# Patient Record
Sex: FEMALE | Race: WHITE | NOT HISPANIC OR LATINO | Employment: OTHER | URBAN - NONMETROPOLITAN AREA
[De-identification: names, ages, dates, MRNs, and addresses within clinical notes are randomized per-mention and may not be internally consistent; named-entity substitution may affect disease eponyms.]

---

## 2017-03-21 ENCOUNTER — HOSPITAL ENCOUNTER (OUTPATIENT)
Facility: HOSPITAL | Age: 77
Setting detail: OBSERVATION
Discharge: HOME | End: 2017-03-23
Attending: EMERGENCY MEDICINE | Admitting: INTERNAL MEDICINE

## 2017-03-21 ENCOUNTER — APPOINTMENT (OUTPATIENT)
Dept: GENERAL RADIOLOGY | Facility: HOSPITAL | Age: 77
End: 2017-03-21

## 2017-03-21 DIAGNOSIS — I20.0 UNSTABLE ANGINA (HCC): ICD-10-CM

## 2017-03-21 DIAGNOSIS — R07.9 CHEST PAIN, UNSPECIFIED TYPE: Primary | ICD-10-CM

## 2017-03-21 LAB
ALBUMIN SERPL-MCNC: 4.1 G/DL (ref 3.4–4.8)
ALBUMIN/GLOB SERPL: 1.3 G/DL (ref 1.5–2.5)
ALP SERPL-CCNC: 75 U/L (ref 35–104)
ALT SERPL W P-5'-P-CCNC: 18 U/L (ref 10–36)
ANION GAP SERPL CALCULATED.3IONS-SCNC: 3.6 MMOL/L (ref 3.6–11.2)
APTT PPP: 28.3 SECONDS (ref 24.4–31)
AST SERPL-CCNC: 28 U/L (ref 10–30)
BASOPHILS # BLD AUTO: 0.01 10*3/MM3 (ref 0–0.3)
BASOPHILS # BLD AUTO: 0.01 10*3/MM3 (ref 0–0.3)
BASOPHILS NFR BLD AUTO: 0.2 % (ref 0–2)
BASOPHILS NFR BLD AUTO: 0.2 % (ref 0–2)
BILIRUB SERPL-MCNC: 0.5 MG/DL (ref 0.2–1.8)
BUN BLD-MCNC: 20 MG/DL (ref 7–21)
BUN/CREAT SERPL: 20.2 (ref 7–25)
CALCIUM SPEC-SCNC: 9.3 MG/DL (ref 7.7–10)
CHLORIDE SERPL-SCNC: 105 MMOL/L (ref 99–112)
CHOLEST SERPL-MCNC: 131 MG/DL (ref 0–200)
CK MB SERPL-CCNC: 0.51 NG/ML (ref 0–5)
CK MB SERPL-RTO: 0.8 % (ref 0–3)
CK SERPL-CCNC: 63 U/L (ref 24–173)
CO2 SERPL-SCNC: 29.4 MMOL/L (ref 24.3–31.9)
CREAT BLD-MCNC: 0.99 MG/DL (ref 0.43–1.29)
D DIMER PPP FEU-MCNC: 0.34 MG/L (FEU) (ref 0–0.5)
DEPRECATED RDW RBC AUTO: 42.5 FL (ref 37–54)
DEPRECATED RDW RBC AUTO: 43 FL (ref 37–54)
EOSINOPHIL # BLD AUTO: 0 10*3/MM3 (ref 0–0.7)
EOSINOPHIL # BLD AUTO: 0.01 10*3/MM3 (ref 0–0.7)
EOSINOPHIL NFR BLD AUTO: 0 % (ref 0–7)
EOSINOPHIL NFR BLD AUTO: 0.2 % (ref 0–7)
ERYTHROCYTE [DISTWIDTH] IN BLOOD BY AUTOMATED COUNT: 13 % (ref 11.5–14.5)
ERYTHROCYTE [DISTWIDTH] IN BLOOD BY AUTOMATED COUNT: 13 % (ref 11.5–14.5)
GFR SERPL CREATININE-BSD FRML MDRD: 55 ML/MIN/1.73
GLOBULIN UR ELPH-MCNC: 3.2 GM/DL
GLUCOSE BLD-MCNC: 106 MG/DL (ref 70–110)
HCT VFR BLD AUTO: 42.8 % (ref 37–47)
HCT VFR BLD AUTO: 43.3 % (ref 37–47)
HDLC SERPL-MCNC: 44 MG/DL (ref 60–100)
HGB BLD-MCNC: 14.6 G/DL (ref 12–16)
HGB BLD-MCNC: 14.8 G/DL (ref 12–16)
HOLD SPECIMEN: NORMAL
HOLD SPECIMEN: NORMAL
IMM GRANULOCYTES # BLD: 0 10*3/MM3 (ref 0–0.03)
IMM GRANULOCYTES # BLD: 0.01 10*3/MM3 (ref 0–0.03)
IMM GRANULOCYTES NFR BLD: 0 % (ref 0–0.5)
IMM GRANULOCYTES NFR BLD: 0.2 % (ref 0–0.5)
INR PPP: 0.93 (ref 0.8–1.1)
INR PPP: 0.97 (ref 0.8–1.1)
LDLC SERPL CALC-MCNC: 59 MG/DL (ref 0–100)
LDLC/HDLC SERPL: 1.34 {RATIO}
LYMPHOCYTES # BLD AUTO: 1.46 10*3/MM3 (ref 1–3)
LYMPHOCYTES # BLD AUTO: 2.02 10*3/MM3 (ref 1–3)
LYMPHOCYTES NFR BLD AUTO: 30.6 % (ref 16–46)
LYMPHOCYTES NFR BLD AUTO: 37.9 % (ref 16–46)
MCH RBC QN AUTO: 31 PG (ref 27–33)
MCH RBC QN AUTO: 31 PG (ref 27–33)
MCHC RBC AUTO-ENTMCNC: 34.1 G/DL (ref 33–37)
MCHC RBC AUTO-ENTMCNC: 34.2 G/DL (ref 33–37)
MCV RBC AUTO: 90.8 FL (ref 80–94)
MCV RBC AUTO: 90.9 FL (ref 80–94)
MONOCYTES # BLD AUTO: 0.49 10*3/MM3 (ref 0.1–0.9)
MONOCYTES # BLD AUTO: 0.55 10*3/MM3 (ref 0.1–0.9)
MONOCYTES NFR BLD AUTO: 11.5 % (ref 0–12)
MONOCYTES NFR BLD AUTO: 9.2 % (ref 0–12)
NEUTROPHILS # BLD AUTO: 2.73 10*3/MM3 (ref 1.4–6.5)
NEUTROPHILS # BLD AUTO: 2.81 10*3/MM3 (ref 1.4–6.5)
NEUTROPHILS NFR BLD AUTO: 52.7 % (ref 40–75)
NEUTROPHILS NFR BLD AUTO: 57.3 % (ref 40–75)
OSMOLALITY SERPL CALC.SUM OF ELEC: 278.7 MOSM/KG (ref 273–305)
PLATELET # BLD AUTO: 166 10*3/MM3 (ref 130–400)
PLATELET # BLD AUTO: 174 10*3/MM3 (ref 130–400)
PMV BLD AUTO: 9.7 FL (ref 6–10)
PMV BLD AUTO: 9.9 FL (ref 6–10)
POTASSIUM BLD-SCNC: 3.7 MMOL/L (ref 3.5–5.3)
PROT SERPL-MCNC: 7.3 G/DL (ref 6–8)
PROTHROMBIN TIME: 10.3 SECONDS (ref 9.8–11.9)
PROTHROMBIN TIME: 10.7 SECONDS (ref 9.8–11.9)
RBC # BLD AUTO: 4.71 10*6/MM3 (ref 4.2–5.4)
RBC # BLD AUTO: 4.77 10*6/MM3 (ref 4.2–5.4)
SODIUM BLD-SCNC: 138 MMOL/L (ref 135–153)
TRIGL SERPL-MCNC: 140 MG/DL (ref 0–150)
TROPONIN I SERPL-MCNC: <0.006 NG/ML
VLDLC SERPL-MCNC: 28 MG/DL
WBC NRBC COR # BLD: 4.77 10*3/MM3 (ref 4.5–12.5)
WBC NRBC COR # BLD: 5.33 10*3/MM3 (ref 4.5–12.5)
WHOLE BLOOD HOLD SPECIMEN: NORMAL
WHOLE BLOOD HOLD SPECIMEN: NORMAL

## 2017-03-21 PROCEDURE — 93005 ELECTROCARDIOGRAM TRACING: CPT | Performed by: INTERNAL MEDICINE

## 2017-03-21 PROCEDURE — 84484 ASSAY OF TROPONIN QUANT: CPT | Performed by: PHYSICIAN ASSISTANT

## 2017-03-21 PROCEDURE — 71010 XR CHEST 1 VW: CPT | Performed by: RADIOLOGY

## 2017-03-21 PROCEDURE — 25010000002 ENOXAPARIN PER 10 MG: Performed by: INTERNAL MEDICINE

## 2017-03-21 PROCEDURE — 80061 LIPID PANEL: CPT | Performed by: INTERNAL MEDICINE

## 2017-03-21 PROCEDURE — G0378 HOSPITAL OBSERVATION PER HR: HCPCS

## 2017-03-21 PROCEDURE — 96372 THER/PROPH/DIAG INJ SC/IM: CPT

## 2017-03-21 PROCEDURE — 85730 THROMBOPLASTIN TIME PARTIAL: CPT | Performed by: INTERNAL MEDICINE

## 2017-03-21 PROCEDURE — 85610 PROTHROMBIN TIME: CPT | Performed by: INTERNAL MEDICINE

## 2017-03-21 PROCEDURE — 93005 ELECTROCARDIOGRAM TRACING: CPT | Performed by: PHYSICIAN ASSISTANT

## 2017-03-21 PROCEDURE — 99204 OFFICE O/P NEW MOD 45 MIN: CPT | Performed by: INTERNAL MEDICINE

## 2017-03-21 PROCEDURE — 71010 HC CHEST PA OR AP: CPT

## 2017-03-21 PROCEDURE — 36415 COLL VENOUS BLD VENIPUNCTURE: CPT

## 2017-03-21 PROCEDURE — 85379 FIBRIN DEGRADATION QUANT: CPT | Performed by: INTERNAL MEDICINE

## 2017-03-21 PROCEDURE — 96365 THER/PROPH/DIAG IV INF INIT: CPT

## 2017-03-21 PROCEDURE — 96376 TX/PRO/DX INJ SAME DRUG ADON: CPT

## 2017-03-21 PROCEDURE — 82553 CREATINE MB FRACTION: CPT | Performed by: PHYSICIAN ASSISTANT

## 2017-03-21 PROCEDURE — 80053 COMPREHEN METABOLIC PANEL: CPT | Performed by: PHYSICIAN ASSISTANT

## 2017-03-21 PROCEDURE — 85025 COMPLETE CBC W/AUTO DIFF WBC: CPT | Performed by: INTERNAL MEDICINE

## 2017-03-21 PROCEDURE — 99284 EMERGENCY DEPT VISIT MOD MDM: CPT

## 2017-03-21 PROCEDURE — 93010 ELECTROCARDIOGRAM REPORT: CPT | Performed by: INTERNAL MEDICINE

## 2017-03-21 PROCEDURE — 25010000002 HEPARIN (PORCINE) PER 1000 UNITS: Performed by: INTERNAL MEDICINE

## 2017-03-21 PROCEDURE — 82550 ASSAY OF CK (CPK): CPT | Performed by: PHYSICIAN ASSISTANT

## 2017-03-21 PROCEDURE — 85610 PROTHROMBIN TIME: CPT | Performed by: PHYSICIAN ASSISTANT

## 2017-03-21 PROCEDURE — 85025 COMPLETE CBC W/AUTO DIFF WBC: CPT | Performed by: PHYSICIAN ASSISTANT

## 2017-03-21 RX ORDER — CLOPIDOGREL BISULFATE 75 MG/1
75 TABLET ORAL DAILY
Status: DISCONTINUED | OUTPATIENT
Start: 2017-03-22 | End: 2017-03-23 | Stop reason: HOSPADM

## 2017-03-21 RX ORDER — ASPIRIN 325 MG
325 TABLET ORAL DAILY
Status: DISCONTINUED | OUTPATIENT
Start: 2017-03-22 | End: 2017-03-23 | Stop reason: HOSPADM

## 2017-03-21 RX ORDER — VITAMIN E 268 MG
400 CAPSULE ORAL EVERY 12 HOURS SCHEDULED
Status: DISCONTINUED | OUTPATIENT
Start: 2017-03-21 | End: 2017-03-23 | Stop reason: HOSPADM

## 2017-03-21 RX ORDER — POLYETHYLENE GLYCOL 3350 17 G/17G
17 POWDER, FOR SOLUTION ORAL DAILY PRN
Status: DISCONTINUED | OUTPATIENT
Start: 2017-03-21 | End: 2017-03-23 | Stop reason: HOSPADM

## 2017-03-21 RX ORDER — CETIRIZINE HYDROCHLORIDE 10 MG/1
10 TABLET ORAL DAILY
COMMUNITY
End: 2022-01-01

## 2017-03-21 RX ORDER — CALCIUM CARBONATE 500(1250)
500 TABLET ORAL DAILY
Status: DISCONTINUED | OUTPATIENT
Start: 2017-03-21 | End: 2017-03-23 | Stop reason: HOSPADM

## 2017-03-21 RX ORDER — VALSARTAN 160 MG/1
160 TABLET ORAL DAILY
Status: DISCONTINUED | OUTPATIENT
Start: 2017-03-22 | End: 2017-03-23 | Stop reason: HOSPADM

## 2017-03-21 RX ORDER — POLYETHYLENE GLYCOL 3350 17 G/17G
17 POWDER, FOR SOLUTION ORAL DAILY PRN
Status: ON HOLD | COMMUNITY
End: 2022-01-01

## 2017-03-21 RX ORDER — I-VITE, TAB 1000-60-2MG (60/BT) 300MCG-200
1 TAB ORAL DAILY
Status: DISCONTINUED | OUTPATIENT
Start: 2017-03-21 | End: 2017-03-23 | Stop reason: HOSPADM

## 2017-03-21 RX ORDER — ATORVASTATIN CALCIUM 20 MG/1
20 TABLET, FILM COATED ORAL DAILY
Status: DISCONTINUED | OUTPATIENT
Start: 2017-03-21 | End: 2017-03-23

## 2017-03-21 RX ORDER — PHENOL 1.4 %
600 AEROSOL, SPRAY (ML) MUCOUS MEMBRANE DAILY
COMMUNITY
End: 2022-01-01

## 2017-03-21 RX ORDER — ROSUVASTATIN CALCIUM 10 MG/1
10 TABLET, COATED ORAL DAILY
COMMUNITY
End: 2017-03-23 | Stop reason: HOSPADM

## 2017-03-21 RX ORDER — DICYCLOMINE HCL 20 MG
20 TABLET ORAL 2 TIMES DAILY
COMMUNITY
End: 2022-01-01 | Stop reason: HOSPADM

## 2017-03-21 RX ORDER — AZITHROMYCIN 250 MG/1
250 TABLET, FILM COATED ORAL DAILY
COMMUNITY
End: 2017-03-23 | Stop reason: HOSPADM

## 2017-03-21 RX ORDER — RANOLAZINE 500 MG/1
500 TABLET, EXTENDED RELEASE ORAL EVERY 12 HOURS SCHEDULED
Status: DISCONTINUED | OUTPATIENT
Start: 2017-03-21 | End: 2017-03-23 | Stop reason: HOSPADM

## 2017-03-21 RX ORDER — I-VITE, TAB 1000-60-2MG (60/BT) 300MCG-200
1 TAB ORAL DAILY
Status: ON HOLD | COMMUNITY
End: 2022-01-01

## 2017-03-21 RX ORDER — HEPARIN SODIUM 5000 [USP'U]/ML
60 INJECTION, SOLUTION INTRAVENOUS; SUBCUTANEOUS ONCE
Status: COMPLETED | OUTPATIENT
Start: 2017-03-21 | End: 2017-03-21

## 2017-03-21 RX ORDER — ASPIRIN 325 MG
325 TABLET ORAL DAILY
COMMUNITY
End: 2017-09-18 | Stop reason: ALTCHOICE

## 2017-03-21 RX ORDER — HEPARIN SODIUM 5000 [USP'U]/ML
30 INJECTION, SOLUTION INTRAVENOUS; SUBCUTANEOUS AS NEEDED
Status: DISCONTINUED | OUTPATIENT
Start: 2017-03-21 | End: 2017-03-22

## 2017-03-21 RX ORDER — DICYCLOMINE HCL 20 MG
20 TABLET ORAL 2 TIMES DAILY
Status: DISCONTINUED | OUTPATIENT
Start: 2017-03-21 | End: 2017-03-23 | Stop reason: HOSPADM

## 2017-03-21 RX ORDER — TRAMADOL HYDROCHLORIDE 50 MG/1
50 TABLET ORAL EVERY 6 HOURS PRN
Status: DISCONTINUED | OUTPATIENT
Start: 2017-03-21 | End: 2017-03-23 | Stop reason: HOSPADM

## 2017-03-21 RX ORDER — AMLODIPINE BESYLATE 5 MG/1
5 TABLET ORAL
Status: DISCONTINUED | OUTPATIENT
Start: 2017-03-21 | End: 2017-03-23 | Stop reason: HOSPADM

## 2017-03-21 RX ORDER — CETIRIZINE HYDROCHLORIDE 10 MG/1
5 TABLET ORAL DAILY
Status: DISCONTINUED | OUTPATIENT
Start: 2017-03-22 | End: 2017-03-23 | Stop reason: HOSPADM

## 2017-03-21 RX ORDER — ISOSORBIDE MONONITRATE 60 MG/1
60 TABLET, EXTENDED RELEASE ORAL DAILY
Status: DISCONTINUED | OUTPATIENT
Start: 2017-03-22 | End: 2017-03-23 | Stop reason: HOSPADM

## 2017-03-21 RX ORDER — METOPROLOL SUCCINATE 50 MG/1
50 TABLET, EXTENDED RELEASE ORAL EVERY 12 HOURS SCHEDULED
Status: DISCONTINUED | OUTPATIENT
Start: 2017-03-21 | End: 2017-03-23 | Stop reason: HOSPADM

## 2017-03-21 RX ORDER — ASPIRIN 325 MG
325 TABLET ORAL ONCE
Status: COMPLETED | OUTPATIENT
Start: 2017-03-21 | End: 2017-03-21

## 2017-03-21 RX ORDER — OMEGA-3S/DHA/EPA/FISH OIL/D3 1150-1000
1 LIQUID (ML) ORAL DAILY
COMMUNITY
End: 2022-01-01 | Stop reason: HOSPADM

## 2017-03-21 RX ORDER — DIAZEPAM 5 MG/1
5 TABLET ORAL ONCE
Status: COMPLETED | OUTPATIENT
Start: 2017-03-22 | End: 2017-03-22

## 2017-03-21 RX ORDER — MINOCYCLINE HYDROCHLORIDE 50 MG/1
100 CAPSULE ORAL DAILY
Status: DISCONTINUED | OUTPATIENT
Start: 2017-03-21 | End: 2017-03-23 | Stop reason: HOSPADM

## 2017-03-21 RX ORDER — AMLODIPINE BESYLATE 5 MG/1
5 TABLET ORAL DAILY
Status: CANCELLED | OUTPATIENT
Start: 2017-03-21

## 2017-03-21 RX ORDER — AZITHROMYCIN 250 MG/1
250 TABLET, FILM COATED ORAL DAILY
Status: CANCELLED | OUTPATIENT
Start: 2017-03-21

## 2017-03-21 RX ORDER — NITROGLYCERIN 0.4 MG/1
TABLET SUBLINGUAL
Status: COMPLETED
Start: 2017-03-21 | End: 2017-03-21

## 2017-03-21 RX ORDER — ISOSORBIDE MONONITRATE 60 MG/1
60 TABLET, EXTENDED RELEASE ORAL DAILY
COMMUNITY
End: 2017-04-21

## 2017-03-21 RX ORDER — PANTOPRAZOLE SODIUM 40 MG/1
40 TABLET, DELAYED RELEASE ORAL DAILY
Status: DISCONTINUED | OUTPATIENT
Start: 2017-03-22 | End: 2017-03-23 | Stop reason: HOSPADM

## 2017-03-21 RX ORDER — MINOCYCLINE HYDROCHLORIDE 100 MG/1
100 CAPSULE ORAL DAILY
COMMUNITY
End: 2022-01-01 | Stop reason: HOSPADM

## 2017-03-21 RX ORDER — NITROGLYCERIN 0.4 MG/1
0.4 TABLET SUBLINGUAL
Status: DISCONTINUED | OUTPATIENT
Start: 2017-03-21 | End: 2017-03-23 | Stop reason: HOSPADM

## 2017-03-21 RX ORDER — DIPHENHYDRAMINE HCL 25 MG
25 CAPSULE ORAL ONCE
Status: COMPLETED | OUTPATIENT
Start: 2017-03-22 | End: 2017-03-22

## 2017-03-21 RX ORDER — SODIUM CHLORIDE 0.9 % (FLUSH) 0.9 %
10 SYRINGE (ML) INJECTION AS NEEDED
Status: DISCONTINUED | OUTPATIENT
Start: 2017-03-21 | End: 2017-03-23 | Stop reason: HOSPADM

## 2017-03-21 RX ORDER — HEPARIN SODIUM 5000 [USP'U]/ML
60 INJECTION, SOLUTION INTRAVENOUS; SUBCUTANEOUS AS NEEDED
Status: DISCONTINUED | OUTPATIENT
Start: 2017-03-21 | End: 2017-03-22

## 2017-03-21 RX ADMIN — HEPARIN SODIUM 12 UNITS/KG/HR: 10000 INJECTION, SOLUTION INTRAVENOUS at 22:52

## 2017-03-21 RX ADMIN — ATORVASTATIN CALCIUM 20 MG: 20 TABLET, FILM COATED ORAL at 19:00

## 2017-03-21 RX ADMIN — METOPROLOL SUCCINATE 50 MG: 50 TABLET, FILM COATED, EXTENDED RELEASE ORAL at 22:56

## 2017-03-21 RX ADMIN — DICYCLOMINE HYDROCHLORIDE 20 MG: 20 TABLET ORAL at 22:56

## 2017-03-21 RX ADMIN — AMLODIPINE BESYLATE 5 MG: 5 TABLET ORAL at 16:23

## 2017-03-21 RX ADMIN — ENOXAPARIN SODIUM 40 MG: 40 INJECTION SUBCUTANEOUS at 18:30

## 2017-03-21 RX ADMIN — Medication 10 ML: at 11:15

## 2017-03-21 RX ADMIN — NITROGLYCERIN 0.4 MG: 0.4 TABLET SUBLINGUAL at 19:14

## 2017-03-21 RX ADMIN — I-VITE, TAB 1000-60-2MG (60/BT) 1 TABLET: TAB at 19:00

## 2017-03-21 RX ADMIN — VITAMIN E CAP 400 UNIT 400 UNITS: 400 CAP at 22:55

## 2017-03-21 RX ADMIN — RANOLAZINE 500 MG: 500 TABLET, FILM COATED, EXTENDED RELEASE ORAL at 22:56

## 2017-03-21 RX ADMIN — ASPIRIN 325 MG: 325 TABLET ORAL at 10:36

## 2017-03-21 RX ADMIN — NITROGLYCERIN: 0.4 TABLET SUBLINGUAL at 19:01

## 2017-03-21 RX ADMIN — HEPARIN SODIUM 4650 UNITS: 5000 INJECTION, SOLUTION INTRAVENOUS; SUBCUTANEOUS at 22:49

## 2017-03-21 RX ADMIN — NITROGLYCERIN 1 INCH: 20 OINTMENT TOPICAL at 22:56

## 2017-03-21 RX ADMIN — Medication 500 MG: at 17:45

## 2017-03-21 RX ADMIN — MINOCYCLINE HYDROCHLORIDE 100 MG: 50 CAPSULE ORAL at 19:00

## 2017-03-21 NOTE — PLAN OF CARE
Problem: Patient Care Overview (Adult)  Goal: Plan of Care Review  Outcome: Ongoing (interventions implemented as appropriate)    Problem: Pain, Acute (Adult)  Goal: Identify Related Risk Factors and Signs and Symptoms  Outcome: Ongoing (interventions implemented as appropriate)

## 2017-03-21 NOTE — CONSULTS
Chief complaint  Chest pain    History of present illness:  Eloina is a very pleasant 77-year-old woman who presents with a history of known coronary artery disease status post bypass surgery and multiple stent implantations since that time.  Her last in implantation was in 2006.  She states that she has done relatively well until this morning when she began to have pressure clamping sensation across her back and into her chest and shoulders.  She notes that the symptoms are identical to her prior presentations with coronary artery disease.  She was hoping that the chest discomfort would resolve however continued and she eventually presented to the hospital as again, she identified the discomfort as being identical to her prior presentations.  She also notes that she's been more Short of breath over the past month.  She denies any orthopnea or edema.  She denies any palpitations, near syncope or syncopal symptoms.  She has otherwise been compliant with medical therapy.    Her 14 point review of systems is negative as was otherwise mentioned in the history of present illness.    Current medications:  Coronary artery disease as noted above  Status post cholecystectomy  Status post hysterectomy  Hyperlipidemia  Hypertension    Current medications:  Norvasc 5 mg daily  Aspirin 325 mg daily  Plavix 75 mg daily  Bentyl 20 mg daily  Premarin 0.3 mg  Imdur 60 mg daily  Metoprolol 50 mg daily  Minocycline  Protonix 40 mg daily  MiraLAX  Ranexa 500 mg twice a day    She has allergies to clindamycin penicillins and sulfa drugs    Her social history is notable for no significant tobacco alcohol or illicit drug use    Family history is noncontributory    Current physical examination:  Blood pressure is 160/80 with a heart rate of 60 respiratory rate is 18  In general she is a well-nourished well-developed female in no apparent distress, alert and oriented ×3  HEENT exam reveals her oral mucosa to be normal.  She has no  significant JVD or hepatojugular reflux.  She has no carotid bruits noted.  Chest is symmetric  Lungs are clear to auscultation without crackles or wheezes  Cardiac exam reveals an intact PMI with a regular rate and rhythm.  There is a normal S1 and S2.  There is no S3 or S4.  There are no murmurs rubs or bruits.  Abdominal exam reveals a soft belly which is nontender with normal bowel sounds and no hepatosplenomegaly.  Extremities reveal no clubbing cyanosis or edema  Peripheral pulses are intact  Musculoskeletal exam is normal  Neurologic exam is normal.    Current laboratory data:  CMP is normal  Initial troponin is less than 0.06 second troponin is less than 0.06 total CK is 63 CK-MB is 0.51  CBC is normal  Chest x-ray is normal  EKG reveals normal sinus rhythm with a right bundle branch block pattern  There are no acute or chronic ischemic changes noted.    Final impression and plan: Overall I am concerned that Eloina has had stuttering chest discomfort all day long which is identical to her prior presentations with coronary artery disease.  At this point I feel that the best course of action would be to proceed directly to cardiac catheterization.  I discussed the risks and benefits of doing so to include the risk of heart attack, stroke and death.  She understands these risks and agrees to proceed.  For now I will place her on a heparin drip in regard to the PCU.  If she continues to have chest discomfort we will consider emergent cardiac catheterization.

## 2017-03-21 NOTE — ED NOTES
EKG performed by tech and shown To Dr. Owen at 0943 on 03/21/2017     Kaitlin Mccall  03/21/17 0977

## 2017-03-21 NOTE — ED NOTES
Tried to call report to [facility]. No one answered. 2nd attempt.     Tona Ruggiero, RN  03/21/17 5841

## 2017-03-21 NOTE — H&P
Chief complaint  chest pain    Subjective     Patient is a 77 y.o. female followed in the office by Dr. Whitney Beal for multiple medical problems.  She presented to the emergency room today complaining of chest pain.  She notes it's a dull substernal pressure type sensation which radiated to her back and to her midsternal area and was associated with shortness of breath.  She notes is very similar to prior episodes of cardiac type chest pain.  She noted that the chest pain did wake her from rest early this morning.  After woke her from rest she had no relief with eating or drinking.  She sought attention in the emergency room.  She notes both the chest pain had resolved by the time she came to the emergency room and has had only intermittent twinges of chest pressure since.  She is currently resting comfortably and has no additional chest pain.    Mrs. Batista describes similar episodes of chest pain back in 2002 when she was complaining of chest pain and chest pressure.  She describes having 3 normal stress test before having a heart catheterization which found three-vessel disease.  She ultimately required coronary bypass grafting.  Following her bypass grafting she has required about 5 additional stents with last stent in 2006.  Her last heart catheter was in [facility] in [place] with Dr. Del Real and no significant obstructions were noted in 2009.  She has been doing relatively well with no chest pain for several months until this recurrence.  She describes the severity of this recent chest pain as an 8-9/10 at its peak.    Review of Systems   Review of Systems   Constitutional: Positive for fatigue. Negative for activity change, appetite change, chills, diaphoresis, fever and unexpected weight change.   HENT: Negative for congestion, drooling, ear pain, facial swelling, hearing loss, nosebleeds, rhinorrhea, sinus pressure, sneezing, sore throat, tinnitus, trouble swallowing and voice change.     Eyes: Negative for photophobia, pain, discharge, redness, itching and visual disturbance.   Respiratory: Positive for chest tightness and shortness of breath. Negative for apnea and choking.    Cardiovascular: Positive for chest pain. Negative for palpitations and leg swelling.   Gastrointestinal: Negative for abdominal distention, abdominal pain, blood in stool, constipation, nausea and vomiting.   Endocrine: Negative for cold intolerance, heat intolerance, polydipsia, polyphagia and polyuria.   Genitourinary: Negative for difficulty urinating, dysuria, enuresis, hematuria and pelvic pain.   Musculoskeletal: Negative for arthralgias, back pain, gait problem, joint swelling, myalgias and neck pain.   Skin: Negative for color change, pallor, rash and wound.   Allergic/Immunologic: Negative for food allergies and immunocompromised state.   Neurological: Negative for dizziness, tremors, weakness, light-headedness and headaches.   Hematological: Negative for adenopathy. Does not bruise/bleed easily.   Psychiatric/Behavioral: Negative for agitation, behavioral problems and suicidal ideas.        Past Medical History  History reviewed. No pertinent past medical history.  Surgical History  Past Surgical History   Procedure Laterality Date   • Appendectomy     • Hysterectomy     • Cholecystectomy     • Hernia repair     • Coronary artery bypass graft     • Coronary stent placement     • Cardiac catheterization     • Cardiac surgery       Family History  History reviewed. No pertinent family history.  Social History  Social History   Substance Use Topics   • Smoking status: Never Smoker   • Smokeless tobacco: None   • Alcohol use No     Home Medications  Prescriptions Prior to Admission   Medication Sig Dispense Refill Last Dose   • amLODIPine (NORVASC) 5 MG tablet Take 5 mg by mouth daily.   3/21/2017 at 0830   • aspirin 325 MG tablet Take 325 mg by mouth Daily.   3/20/2017 at 2200   • azithromycin (ZITHROMAX) 250 MG  tablet Take 250 mg by mouth Daily. Patient has 1 dose left   3/20/2017 at Unknown time   • calcium carbonate (OS-SAM) 600 MG tablet Take 600 mg by mouth Daily.   3/20/2017 at 1200   • cetirizine (zyrTEC) 10 MG tablet Take 10 mg by mouth Daily.   3/21/2017 at 0830   • clopidogrel (PLAVIX) 75 MG tablet Take 75 mg by mouth daily.   3/21/2017 at 0830   • dicyclomine (BENTYL) 20 MG tablet Take 20 mg by mouth 2 (Two) Times a Day. Noon and bedtime   3/20/2017 at 2230   • estrogens, conjugated, (PREMARIN) 0.3 MG tablet Take 0.3 mg by mouth Daily.   3/20/2017 at 1200   • isosorbide mononitrate (IMDUR) 60 MG 24 hr tablet Take 60 mg by mouth Daily.   3/21/2017 at 0830   • metoprolol succinate XL (TOPROL XL) 50 MG 24 hr tablet Take 50 mg by mouth 2 (Two) Times a Day.   3/21/2017 at 0830   • minocycline (MINOCIN,DYNACIN) 100 MG capsule Take 100 mg by mouth Daily.   3/20/2017 at 1200   • Multiple Vitamins-Minerals (I-MEME) tablet tablet Take 1 tablet by mouth Daily.   3/20/2017 at 1200   • Multiple Vitamins-Minerals (MACULAR VITAMIN BENEFIT) tablet Take 1 tablet by mouth Daily.   3/20/2017 at 1200   • pantoprazole (PROTONIX) 40 MG EC tablet Take 40 mg by mouth daily.   3/21/2017 at 0830   • polyethylene glycol (MIRALAX) packet Take 17 g by mouth Daily As Needed.   Past Week at Unknown time   • ranolazine (RANEXA) 500 MG 12 hr tablet Take 500 mg by mouth 2 (two) times a day.   3/21/2017 at 0830   • rosuvastatin (CRESTOR) 10 MG tablet Take 10 mg by mouth Daily.   3/20/2017 at 1200    • traMADol (ULTRAM) 50 MG tablet Take 50 mg by mouth every 6 (six) hours as needed for moderate pain (4-6).   3/21/2017 at 0830   • valsartan (DIOVAN) 160 MG tablet Take 160 mg by mouth Daily.   3/21/2017 at 0830   • vitamin E 400 UNIT capsule Take 400 Units by mouth 2 (Two) Times a Day.   3/21/2017 at 0830         Allergies:  Clindamycin/lincomycin; Penicillins; and Sulfa antibiotics    Objective     Vital Signs  Temp:  [98 °F (36.7 °C)-98.3 °F (36.8  °C)] 98 °F (36.7 °C)  Heart Rate:  [60-83] 60  Resp:  [18-20] 18  BP: (121-168)/() 156/96    Physical Exam:    Physical Exam   Constitutional: She is oriented to person, place, and time. She appears well-developed and well-nourished. No distress.   HENT:   Head: Normocephalic and atraumatic.   Right Ear: External ear normal.   Left Ear: External ear normal.   Nose: Nose normal.   Mouth/Throat: Oropharynx is clear and moist. No oropharyngeal exudate.   Eyes: Conjunctivae and EOM are normal. Pupils are equal, round, and reactive to light. Right eye exhibits no discharge. Left eye exhibits no discharge. No scleral icterus.   Neck: Normal range of motion. Neck supple. No JVD present. No tracheal deviation present. No thyromegaly present.   Cardiovascular: Normal rate, regular rhythm, normal heart sounds and intact distal pulses.  Exam reveals no gallop and no friction rub.    No murmur heard.  Pulmonary/Chest: Effort normal and breath sounds normal. No stridor. No respiratory distress. She has no wheezes. She has no rales. She exhibits no tenderness.   Abdominal: Soft. Bowel sounds are normal. She exhibits no distension and no mass. There is no rebound and no guarding. No hernia.   Genitourinary:   Genitourinary Comments: No Leon catheter   Musculoskeletal: She exhibits no edema, tenderness or deformity.   Lymphadenopathy:     She has no cervical adenopathy.   Neurological: She is alert and oriented to person, place, and time. She has normal reflexes. She displays normal reflexes. No cranial nerve deficit. She exhibits normal muscle tone. Coordination normal.   Skin: No rash noted. She is not diaphoretic. No erythema. No pallor.   Psychiatric: She has a normal mood and affect. Her behavior is normal. Judgment and thought content normal.   Nursing note and vitals reviewed.      Results Review:    I reviewed the patient's clinical results from admission:  Imaging Results (last 72 hours)     Procedure Component  Value Units Date/Time    XR Chest 1 View [[id number]] Collected:  03/21/17 1040     Updated:  03/21/17 1117    Narrative:       XR CHEST 1 VW-     CLINICAL INDICATION: Chest pain protocol          COMPARISON: 08/10/2016      TECHNIQUE: Single frontal view of the chest.     FINDINGS:     There is no focal alveolar infiltrate or effusion.  The cardiac silhouette is normal. The pulmonary vasculature is  unremarkable.  There is no evidence of an acute osseous abnormality.   There are no suspicious-appearing parenchymal soft tissue nodules.            Impression:       No evidence of active or acute cardiopulmonary disease on today's chest  radiograph.         This report was finalized on 3/21/2017 10:40 AM by Dr. Joshua Chaudhry MD.           Lab Results (last 72 hours)     Procedure Component Value Units Date/Time    CBC & Differential [[id number]] Collected:  03/21/17 1036    Specimen:  Blood Updated:  03/21/17 1053    Narrative:       The following orders were created for panel order CBC & Differential.  Procedure                               Abnormality         Status                     ---------                               -----------         ------                     CBC Auto Differential[[id number]]         Normal              Final result                 Please view results for these tests on the individual orders.    CBC Auto Differential [[id number]]  (Normal) Collected:  03/21/17 1036    Specimen:  Blood Updated:  03/21/17 1053     WBC 4.77 10*3/mm3      RBC 4.77 10*6/mm3      Hemoglobin 14.8 g/dL      Hematocrit 43.3 %      MCV 90.8 fL      MCH 31.0 pg      MCHC 34.2 g/dL      RDW 13.0 %      RDW-SD 43.0 fl      MPV 9.9 fL      Platelets 166 10*3/mm3      Neutrophil % 57.3 %      Lymphocyte % 30.6 %      Monocyte % 11.5 %      Eosinophil % 0.2 %      Basophil % 0.2 %      Immature Grans % 0.2 %      Neutrophils, Absolute 2.73 10*3/mm3      Lymphocytes, Absolute 1.46 10*3/mm3      Monocytes, Absolute 0.55 10*3/mm3       Eosinophils, Absolute 0.01 10*3/mm3      Basophils, Absolute 0.01 10*3/mm3      Immature Grans, Absolute 0.01 10*3/mm3     Protime-INR [[id number]]  (Normal) Collected:  03/21/17 1036    Specimen:  Blood Updated:  03/21/17 1102     Protime 10.3 Seconds      INR 0.93     Narrative:       Patients not on anticoagulant therapy:    INR 0.90-1.10     Suggested INR therapeutic range for stable oral anticoagulant therapy:             Routine therapy                      2.00-3.00           Recurrent MI                         2.50-3.50           Mechanical prosthetic valve          2.50-3.50    Comprehensive Metabolic Panel [[id number]]  (Abnormal) Collected:  03/21/17 1036    Specimen:  Blood Updated:  03/21/17 1106     Glucose 106 mg/dL      BUN 20 mg/dL      Creatinine 0.99 mg/dL      Sodium 138 mmol/L      Potassium 3.7 mmol/L      Chloride 105 mmol/L      CO2 29.4 mmol/L      Calcium 9.3 mg/dL      Total Protein 7.3 g/dL      Albumin 4.10 g/dL      ALT (SGPT) 18 U/L      AST (SGOT) 28 U/L      Alkaline Phosphatase 75 U/L       Note New Reference Ranges        Total Bilirubin 0.5 mg/dL      eGFR Non African Amer 55 (L) mL/min/1.73      Globulin 3.2 gm/dL      A/G Ratio 1.3 (L) g/dL      BUN/Creatinine Ratio 20.2      Anion Gap 3.6 mmol/L     Narrative:       The MDRD GFR formula is only valid for adults with stable renal function between ages 18 and 70.    Osmolality, Calculated [[id number]]  (Normal) Collected:  03/21/17 1036    Specimen:  Blood Updated:  03/21/17 1106     Osmolality Calc 278.7 mOsm/kg     Troponin [[id number]]  (Normal) Collected:  03/21/17 1036    Specimen:  Blood Updated:  03/21/17 1113     Troponin I <0.006 ng/mL     Narrative:       Ultra Troponin I Reference Range:         <=0.039 ng/mL: Negative    0.04-0.779 ng/mL: Indeterminate Range. Suspicious of MI.  Clinical correlation required.       >=0.78  ng/mL: Consistent with myocardial injury.  Clinical correlation required.    CK [[id number]]   (Normal) Collected:  03/21/17 1401    Specimen:  Blood from Arm, Right Updated:  03/21/17 1428     Creatine Kinase 63 U/L     Troponin [[id number]]  (Normal) Collected:  03/21/17 1401    Specimen:  Blood from Arm, Right Updated:  03/21/17 1439     Troponin I <0.006 ng/mL     Narrative:       Ultra Troponin I Reference Range:         <=0.039 ng/mL: Negative    0.04-0.779 ng/mL: Indeterminate Range. Suspicious of MI.  Clinical correlation required.       >=0.78  ng/mL: Consistent with myocardial injury.  Clinical correlation required.    CK-MB [[id number]]  (Normal) Collected:  03/21/17 1401    Specimen:  Blood from Arm, Right Updated:  03/21/17 1439     CKMB 0.51 ng/mL     CK-MB Index [[id number]]  (Normal) Collected:  03/21/17 1401    Specimen:  Blood from Arm, Right Updated:  03/21/17 1439     CK-MB Index 0.8 %     [place] Draw [[id number]] Collected:  03/21/17 1036    Specimen:  Blood Updated:  03/21/17 1501    Narrative:       The following orders were created for panel order [place] Draw.  Procedure                               Abnormality         Status                     ---------                               -----------         ------                     Light Blue Top[[id number]]                                    Final result               Green Top (Gel)[[id number]]                                   Final result               Lavender Top[[id number]]                                      Final result               Gold Top - SST[[id number]]                                    Final result                 Please view results for these tests on the individual orders.    Light Blue Top [[id number]] Collected:  03/21/17 1036    Specimen:  Blood Updated:  03/21/17 1501     Extra Tube hold for add-on       Auto resulted       Green Top (Gel) [[id number]] Collected:  03/21/17 1036    Specimen:  Blood Updated:  03/21/17 1501     Extra Tube Hold for add-ons.       Auto resulted.       Lavender Top [[id number]] Collected:  03/21/17 1036     Specimen:  Blood Updated:  03/21/17 1501     Extra Tube hold for add-on       Auto resulted       Gold Top - SST [[id number]] Collected:  03/21/17 1036    Specimen:  Blood Updated:  03/21/17 1501     Extra Tube Hold for add-ons.       Auto resulted.                 Assessment/Plan     Active Problems:   1) unstable angina   2) history of coronary bypass grafting in 2002 and 5 post bypass stents from [id number]  3) history of hiatal hernia status post 2 surgeries  4) hypertension  5) hyperlipidemia  6) right bundle-branch block-old from prior EKGs from 2015.          I discussed the patients findings and my recommendations with patient, family, nursing staff and consulting provider.     Alex Og MD  03/21/17  5:13 PM    Time: 33 minutes of time with history and physical and coordination of care

## 2017-03-21 NOTE — ED PROVIDER NOTES
Subjective   Patient is a 77 y.o. female presenting with chest pain.   Chest Pain   Pain location:  L chest  Pain quality: pressure    Pain radiates to:  Neck  Onset quality:  Sudden  Duration: Awoke at 7:30 with pain.  Timing:  Rare  Progression:  Resolved  Chronicity:  Recurrent  Context: not breathing and not movement    Context comment:  Patient reports that she had about a 30 minute episode of chest pressure with sob.  Relieved by:  Rest  Worsened by:  Nothing  Associated symptoms: no abdominal pain, no anxiety, no fever and no vomiting        Review of Systems   Constitutional: Negative.  Negative for fever.   HENT: Negative.    Respiratory: Negative.    Cardiovascular: Positive for chest pain.   Gastrointestinal: Negative.  Negative for abdominal pain and vomiting.   Endocrine: Negative.    Genitourinary: Negative.  Negative for dysuria.   Skin: Negative.    Neurological: Negative.    Psychiatric/Behavioral: Negative.    All other systems reviewed and are negative.      History reviewed. No pertinent past medical history.    Allergies   Allergen Reactions   • Clindamycin/Lincomycin    • Penicillins    • Sulfa Antibiotics        Past Surgical History   Procedure Laterality Date   • Appendectomy     • Hysterectomy     • Cholecystectomy     • Hernia repair     • Coronary artery bypass graft     • Coronary stent placement     • Cardiac catheterization     • Cardiac surgery         History reviewed. No pertinent family history.    Social History     Social History   • Marital status:      Spouse name: N/A   • Number of children: N/A   • Years of education: N/A     Social History Main Topics   • Smoking status: Never Smoker   • Smokeless tobacco: None   • Alcohol use No   • Drug use: No   • Sexual activity: Defer     Other Topics Concern   • None     Social History Narrative   • None           Objective   Physical Exam   Constitutional: She is oriented to person, place, and time. She appears well-developed  and well-nourished. No distress.   HENT:   Head: Normocephalic and atraumatic.   Right Ear: External ear normal.   Left Ear: External ear normal.   Nose: Nose normal.   Eyes: Conjunctivae and EOM are normal. Pupils are equal, round, and reactive to light.   Neck: Normal range of motion. Neck supple. No JVD present. No tracheal deviation present.   Cardiovascular: Normal rate, regular rhythm and normal heart sounds.    No murmur heard.  Pulmonary/Chest: Effort normal and breath sounds normal. No respiratory distress. She has no wheezes.   Abdominal: Soft. Bowel sounds are normal. There is no tenderness.   Musculoskeletal: Normal range of motion. She exhibits no edema or deformity.   Neurological: She is alert and oriented to person, place, and time. No cranial nerve deficit.   Skin: Skin is warm and dry. No rash noted. She is not diaphoretic. No erythema. No pallor.   Psychiatric: She has a normal mood and affect. Her behavior is normal. Thought content normal.   Nursing note and vitals reviewed.      Procedures         ED Course  ED Course   Value Comment By Time    Dr Og to call back JAIME Garcia 03/21 1119   ECG 12 Lead Normal sinus rhythm rate is 71 patient does have right bundle-branch block this is similar to her previous EKGs per JAIME Tejada 03/21 1120    Discussed with Dr. Og and Dr. Huertas. telemetry admit JAIME Garcia 03/21 1149                  MDM  Number of Diagnoses or Management Options  Chest pain, unspecified type: new and requires workup     Amount and/or Complexity of Data Reviewed  Clinical lab tests: ordered and reviewed  Tests in the radiology section of CPT®: ordered and reviewed  Tests in the medicine section of CPT®: ordered and reviewed  Discuss the patient with other providers: yes  Independent visualization of images, tracings, or specimens: yes    Risk of Complications, Morbidity, and/or Mortality  Presenting problems: moderate        Final diagnoses:    Chest pain, unspecified type            JAIME Garcia  03/21/17 1416

## 2017-03-22 LAB
ACT BLD: 157 SECONDS (ref 82–152)
ACT BLD: 157 SECONDS (ref 82–152)
ACT BLD: 178 SECONDS (ref 82–152)
ACT BLD: 229 SECONDS (ref 82–152)
APTT PPP: >100 SECONDS (ref 24.4–31)
HBA1C MFR BLD: 5.8 % (ref 4.5–5.7)
TROPONIN I SERPL-MCNC: <0.006 NG/ML

## 2017-03-22 PROCEDURE — C1769 GUIDE WIRE: HCPCS | Performed by: INTERNAL MEDICINE

## 2017-03-22 PROCEDURE — 63710000001 DIPHENHYDRAMINE PER 50 MG: Performed by: INTERNAL MEDICINE

## 2017-03-22 PROCEDURE — 25010000002 MORPHINE SULFATE (PF) 2 MG/ML SOLUTION

## 2017-03-22 PROCEDURE — 93455 CORONARY ART/GRFT ANGIO S&I: CPT | Performed by: INTERNAL MEDICINE

## 2017-03-22 PROCEDURE — 96366 THER/PROPH/DIAG IV INF ADDON: CPT

## 2017-03-22 PROCEDURE — 25010000002 HEPARIN (PORCINE) PER 1000 UNITS: Performed by: INTERNAL MEDICINE

## 2017-03-22 PROCEDURE — 25010000002 MIDAZOLAM PER 1 MG: Performed by: INTERNAL MEDICINE

## 2017-03-22 PROCEDURE — G0378 HOSPITAL OBSERVATION PER HR: HCPCS

## 2017-03-22 PROCEDURE — 85347 COAGULATION TIME ACTIVATED: CPT

## 2017-03-22 PROCEDURE — C1725 CATH, TRANSLUMIN NON-LASER: HCPCS | Performed by: INTERNAL MEDICINE

## 2017-03-22 PROCEDURE — 92937 PRQ TRLUML REVSC CAB GRF 1: CPT | Performed by: INTERNAL MEDICINE

## 2017-03-22 PROCEDURE — 93010 ELECTROCARDIOGRAM REPORT: CPT | Performed by: INTERNAL MEDICINE

## 2017-03-22 PROCEDURE — 96375 TX/PRO/DX INJ NEW DRUG ADDON: CPT

## 2017-03-22 PROCEDURE — 25010000002 HYDRALAZINE PER 20 MG: Performed by: INTERNAL MEDICINE

## 2017-03-22 PROCEDURE — 84484 ASSAY OF TROPONIN QUANT: CPT | Performed by: PHYSICIAN ASSISTANT

## 2017-03-22 PROCEDURE — C1894 INTRO/SHEATH, NON-LASER: HCPCS | Performed by: INTERNAL MEDICINE

## 2017-03-22 PROCEDURE — 25010000002 FENTANYL CITRATE (PF) 100 MCG/2ML SOLUTION: Performed by: INTERNAL MEDICINE

## 2017-03-22 PROCEDURE — 85730 THROMBOPLASTIN TIME PARTIAL: CPT | Performed by: INTERNAL MEDICINE

## 2017-03-22 PROCEDURE — 83036 HEMOGLOBIN GLYCOSYLATED A1C: CPT | Performed by: INTERNAL MEDICINE

## 2017-03-22 PROCEDURE — 93005 ELECTROCARDIOGRAM TRACING: CPT | Performed by: INTERNAL MEDICINE

## 2017-03-22 PROCEDURE — C1887 CATHETER, GUIDING: HCPCS | Performed by: INTERNAL MEDICINE

## 2017-03-22 PROCEDURE — 0 IOPAMIDOL PER 1 ML: Performed by: INTERNAL MEDICINE

## 2017-03-22 PROCEDURE — 99024 POSTOP FOLLOW-UP VISIT: CPT | Performed by: INTERNAL MEDICINE

## 2017-03-22 RX ORDER — NITROGLYCERIN 5 MG/ML
INJECTION, SOLUTION INTRAVENOUS AS NEEDED
Status: DISCONTINUED | OUTPATIENT
Start: 2017-03-22 | End: 2017-03-22 | Stop reason: HOSPADM

## 2017-03-22 RX ORDER — MIDAZOLAM HYDROCHLORIDE 1 MG/ML
INJECTION INTRAMUSCULAR; INTRAVENOUS AS NEEDED
Status: DISCONTINUED | OUTPATIENT
Start: 2017-03-22 | End: 2017-03-22 | Stop reason: HOSPADM

## 2017-03-22 RX ORDER — HEPARIN SODIUM 1000 [USP'U]/ML
INJECTION, SOLUTION INTRAVENOUS; SUBCUTANEOUS AS NEEDED
Status: DISCONTINUED | OUTPATIENT
Start: 2017-03-22 | End: 2017-03-22 | Stop reason: HOSPADM

## 2017-03-22 RX ORDER — SODIUM CHLORIDE 9 MG/ML
100 INJECTION, SOLUTION INTRAVENOUS CONTINUOUS
Status: DISCONTINUED | OUTPATIENT
Start: 2017-03-22 | End: 2017-03-23 | Stop reason: HOSPADM

## 2017-03-22 RX ORDER — LIDOCAINE HYDROCHLORIDE 20 MG/ML
INJECTION, SOLUTION INFILTRATION; PERINEURAL AS NEEDED
Status: DISCONTINUED | OUTPATIENT
Start: 2017-03-22 | End: 2017-03-22 | Stop reason: HOSPADM

## 2017-03-22 RX ORDER — MORPHINE SULFATE 2 MG/ML
INJECTION, SOLUTION INTRAMUSCULAR; INTRAVENOUS
Status: COMPLETED
Start: 2017-03-22 | End: 2017-03-22

## 2017-03-22 RX ORDER — SODIUM CHLORIDE 9 MG/ML
INJECTION, SOLUTION INTRAVENOUS
Status: COMPLETED
Start: 2017-03-22 | End: 2017-03-22

## 2017-03-22 RX ORDER — FENTANYL CITRATE 50 UG/ML
INJECTION, SOLUTION INTRAMUSCULAR; INTRAVENOUS AS NEEDED
Status: DISCONTINUED | OUTPATIENT
Start: 2017-03-22 | End: 2017-03-22 | Stop reason: HOSPADM

## 2017-03-22 RX ORDER — SODIUM CHLORIDE 9 MG/ML
INJECTION, SOLUTION INTRAVENOUS CONTINUOUS PRN
Status: DISCONTINUED | OUTPATIENT
Start: 2017-03-22 | End: 2017-03-22 | Stop reason: HOSPADM

## 2017-03-22 RX ORDER — MORPHINE SULFATE 2 MG/ML
2 INJECTION, SOLUTION INTRAMUSCULAR; INTRAVENOUS ONCE
Status: DISCONTINUED | OUTPATIENT
Start: 2017-03-22 | End: 2017-03-23 | Stop reason: HOSPADM

## 2017-03-22 RX ORDER — HYDRALAZINE HYDROCHLORIDE 20 MG/ML
INJECTION INTRAMUSCULAR; INTRAVENOUS AS NEEDED
Status: DISCONTINUED | OUTPATIENT
Start: 2017-03-22 | End: 2017-03-22 | Stop reason: HOSPADM

## 2017-03-22 RX ADMIN — RANOLAZINE 500 MG: 500 TABLET, FILM COATED, EXTENDED RELEASE ORAL at 21:04

## 2017-03-22 RX ADMIN — VITAMIN E CAP 400 UNIT 400 UNITS: 400 CAP at 15:21

## 2017-03-22 RX ADMIN — METOPROLOL SUCCINATE 50 MG: 50 TABLET, FILM COATED, EXTENDED RELEASE ORAL at 21:04

## 2017-03-22 RX ADMIN — AMLODIPINE BESYLATE 5 MG: 5 TABLET ORAL at 15:30

## 2017-03-22 RX ADMIN — CETIRIZINE HYDROCHLORIDE 5 MG: 10 TABLET ORAL at 15:24

## 2017-03-22 RX ADMIN — SODIUM CHLORIDE: 9 INJECTION, SOLUTION INTRAVENOUS at 06:58

## 2017-03-22 RX ADMIN — DICYCLOMINE HYDROCHLORIDE 20 MG: 20 TABLET ORAL at 21:04

## 2017-03-22 RX ADMIN — DIAZEPAM 5 MG: 5 TABLET ORAL at 06:53

## 2017-03-22 RX ADMIN — CLOPIDOGREL 75 MG: 75 TABLET, FILM COATED ORAL at 06:54

## 2017-03-22 RX ADMIN — VALSARTAN 160 MG: 160 TABLET, FILM COATED ORAL at 15:23

## 2017-03-22 RX ADMIN — Medication 500 MG: at 15:23

## 2017-03-22 RX ADMIN — TRAMADOL HYDROCHLORIDE 50 MG: 50 TABLET, FILM COATED ORAL at 21:05

## 2017-03-22 RX ADMIN — DICYCLOMINE HYDROCHLORIDE 20 MG: 20 TABLET ORAL at 15:21

## 2017-03-22 RX ADMIN — DIPHENHYDRAMINE HYDROCHLORIDE 25 MG: 25 CAPSULE ORAL at 06:54

## 2017-03-22 RX ADMIN — TRAMADOL HYDROCHLORIDE 50 MG: 50 TABLET, FILM COATED ORAL at 00:54

## 2017-03-22 RX ADMIN — VITAMIN E CAP 400 UNIT 400 UNITS: 400 CAP at 21:04

## 2017-03-22 RX ADMIN — ASPIRIN 325 MG: 325 TABLET ORAL at 06:54

## 2017-03-22 RX ADMIN — ISOSORBIDE MONONITRATE 60 MG: 60 TABLET, EXTENDED RELEASE ORAL at 15:24

## 2017-03-22 RX ADMIN — PANTOPRAZOLE SODIUM 40 MG: 40 TABLET, DELAYED RELEASE ORAL at 15:30

## 2017-03-22 RX ADMIN — ATORVASTATIN CALCIUM 20 MG: 20 TABLET, FILM COATED ORAL at 15:25

## 2017-03-22 RX ADMIN — I-VITE, TAB 1000-60-2MG (60/BT) 1 TABLET: TAB at 15:25

## 2017-03-22 RX ADMIN — MORPHINE SULFATE 2 MG: 2 INJECTION, SOLUTION INTRAMUSCULAR; INTRAVENOUS at 10:42

## 2017-03-22 RX ADMIN — MINOCYCLINE HYDROCHLORIDE 100 MG: 50 CAPSULE ORAL at 15:24

## 2017-03-22 RX ADMIN — TRAMADOL HYDROCHLORIDE 50 MG: 50 TABLET, FILM COATED ORAL at 13:20

## 2017-03-22 NOTE — PROGRESS NOTES
Discharge Planning Assessment  [facility]     Patient Name: Eloina Batista  MRN: [id number]  Today's Date: 3/22/2017    Admit Date: 3/21/2017          Discharge Needs Assessment       03/22/17 1412    Living Environment    Lives With spouse   Roberto Batista    Living Arrangements house    Quality Of Family Relationships supportive;helpful;involved    Able to Return to Prior Living Arrangements yes    Discharge Needs Assessment    Concerns To Be Addressed no discharge needs identified    Readmission Within The Last 30 Days no previous admission in last 30 days    Equipment Currently Used at Home none    Equipment Needed After Discharge none    Transportation Available family or friend will provide;car    Roberto will provide transportation at discharge.    Discharge Disposition still a patient            Discharge Plan       03/22/17 1414    Case Management/Social Work Plan    Plan Patient is from home where she lives with her  Roberto. Pt plans to return there at discharge. Pt utilizes no HH or DME. No CM needs identified. CM will continue to monitor.    Patient/Family In Agreement With Plan yes    Additional Comments Pt was admitted to [facility] with c/o CP and SOA. Plans to monitor patient. Consult cardiology. Cardiology started Hep gtt and moved pt to PCU with plans for a heart cath.         Discharge Placement     No information found                Demographic Summary       03/22/17 1406    Referral Information    Admission Type observation    Arrived From home or self-care    Referral Source admission list    Reason For Consult discharge planning    Contact Information    Permission Granted to Share Information With             Functional Status       03/22/17 0190    Functional Status Current    Current Functional Level Comment Patient is currently on bedrest lying flat due to sheath being pulled.    Functional Status Prior    Prior Functional Level Comment Patient is independent with ADL's at home.     Activity Tolerance    Current Activity Limitations bed rest   Pt lying flat at this time.    Usual Activity Tolerance good    Current Activity Tolerance other (see comments)   Pt lying flat at this time.    Cognitive/Perceptual/Developmental    Current Mental Status/Cognitive Functioning no deficits noted    Recent Changes in Mental Status/Cognitive Functioning no changes    Employment/Financial    Financial Concerns none    Employment/Finance Comments Patient has Medicare and [employer]. Patient utilizes [facility]. Pt states that there are no issues with obtaining prescriptions.         Legal       03/22/17 3092    Legal    Legal Comments Patient does not have POA/AD and does not wish to receive information at this time.             Kat Smith, ARISTEO

## 2017-03-22 NOTE — CONSULTS
Time In 1030 Time Out 1850      Order received for Cardiac Rehab Consultation.     Patient will be scheduled at [facility] Cardiac [facility]      Information discussed with: Patient/Family        Educated on: Benefits of Exercise,  Educated on Cardiac Rehab and Program Protocol, Brochure and/or educational material provided, Contact information given and Teach Back Verified      Comments: Pt has attended cardiac rehab in the past.  Very interested in the program.  Will f/u with pt in am and schedule for initial visit.       Thank you for the referral. Please contact the Cardiac Rehab Dept. (ext. [phone]) with any further questions or concerns.

## 2017-03-22 NOTE — PROGRESS NOTES
LOS: 0 days   Patient Care Team:  Flaco Beal MD as PCP - General (Internal Medicine)    Chief Complaint:Eloina Batista a 77 y.o. female presents with a history of known coronary artery disease and recurrent chest discomfort with dynamic ST changes.  She underwent cardiac catheterization today which demonstrated a right dominant coronary artery system with patent stents in the circumflex and right coronary arteries.  The internal mammary artery graft to the LAD was patent.  The vein graft to the diagonal branch had in-stent restenosis and underwent cutting balloon angioplasty       Interval History: She has had no further chest pain      Objective   Vital Signs  Temp:  [98.2 °F (36.8 °C)-98.3 °F (36.8 °C)] 98.2 °F (36.8 °C)  Heart Rate:  [55-81] 78  Resp:  [12-18] 12  BP: (118-185)/() 119/78  Arterial Line BP: (144-181)/(70-83) 181/83    Intake/Output Summary (Last 24 hours) at 03/22/17 1918  Last data filed at 03/22/17 1700   Gross per 24 hour   Intake           611.67 ml   Output              900 ml   Net          -288.33 ml       Comfortable NAD  PERRL, conjunctiva clear  Neck supple, no JVD or thyromegaly appreciated  S1/S2 RRR, no m/r/g  Lungs CTA B, normal effort  Abdomen S/NT/ND (+) BS, no HSM appreciated  Extremities warm, no clubbing, cyanosis, or edema  Normal gait  No visible or palpable skin lesions  A/Ox4, mood and affect appropriate  Site of cardiac catheterization is well-healed    Results Review:        Results from last 7 days  Lab Units 03/21/17  1036   SODIUM mmol/L 138   POTASSIUM mmol/L 3.7   CHLORIDE mmol/L 105   TOTAL CO2 mmol/L 29.4   BUN mg/dL 20   CREATININE mg/dL 0.99   GLUCOSE mg/dL 106   CALCIUM mg/dL 9.3       Results from last 7 days  Lab Units 03/22/17  0051 03/21/17 2010 03/21/17  1401   CK TOTAL U/L  --   --  63   TROPONIN I ng/mL <0.006 <0.006 <0.006   CK MB INDEX %  --   --  0.8       Results from last 7 days  Lab Units 03/21/17 2010 03/21/17  1036   WBC  10*3/mm3 5.33 4.77   HEMOGLOBIN g/dL 14.6 14.8   HEMATOCRIT % 42.8 43.3   PLATELETS 10*3/mm3 174 166       Results from last 7 days  Lab Units 03/22/17  0510 03/21/17 2010 03/21/17  1036   INR   --  0.97 0.93   APTT seconds >100.0* 28.3  --        Results from last 7 days  Lab Units 03/21/17 2010   CHOLESTEROL mg/dL 131           Results from last 7 days  Lab Units 03/21/17 2010   CHOLESTEROL mg/dL 131   TRIGLYCERIDES mg/dL 140   HDL CHOL mg/dL 44*       I reviewed the patient's new clinical results.  I personally viewed and interpreted the patient's EKG/Telemetry data        Medication Review:     amLODIPine 5 mg Oral Q24H   aspirin 325 mg Oral Daily   atorvastatin 20 mg Oral Daily   cetirizine 5 mg Oral Daily   clopidogrel 75 mg Oral Daily   dicyclomine 20 mg Oral BID   I-nancy 1 tablet Oral Daily   isosorbide mononitrate 60 mg Oral Daily   metoprolol succinate XL 50 mg Oral Q12H   minocycline 100 mg Oral Daily   Morphine 2 mg Intravenous Once   nitroglycerin 1 inch Topical Once   oyster shell calcium 500 mg Oral Daily   pantoprazole 40 mg Oral Daily   ranolazine 500 mg Oral Q12H   valsartan 160 mg Oral Daily   vitamin E 400 Units Oral Q12H         sodium chloride 100 mL/hr Last Rate: 100 mL/hr (03/22/17 0854)       Active Problems:    Chest pain        Assessment/Plan   Coronary artery disease  The patient is status post percutaneous revascularization of the vein graft to the diagonal branch.  She should be ready for discharge in the morning.      Alex Blanco MD  03/22/17  7:18 PM

## 2017-03-22 NOTE — PROGRESS NOTES
LOS: 0 days   Interval History: Awake and responsive after cath this AM. She had intervention performed. She has no chest pain, leg pain or focal neuro deficits. No other changes at present . Continue to monitor post-procedure.      History taken from: patient chart    Review of Systems:     Review of Systems - Negative except present illness    Objective     Vital Signs  Temp:  [97.6 °F (36.4 °C)-98.3 °F (36.8 °C)] 98.2 °F (36.8 °C)  Heart Rate:  [55-73] 73  Resp:  [12-18] 12  BP: (121-185)/() 137/88  Arterial Line BP: (164-168)/(73-78) 164/78    Physical Exam:  HEENT; Neg  Resp: Clear  HEART: RRR  Abd: negd   Neur: Normal,nonfocal.            Results Review:     I reviewed the patient's new clinical results  : See Below    Medication Review:     Current Facility-Administered Medications:   •  amLODIPine (NORVASC) tablet 5 mg, 5 mg, Oral, Q24H, Alex gO MD, 5 mg at 03/21/17 1623  •  aspirin tablet 325 mg, 325 mg, Oral, Daily, Alex Og MD, 325 mg at 03/22/17 0654  •  atorvastatin (LIPITOR) tablet 20 mg, 20 mg, Oral, Daily, Alex Og MD, 20 mg at 03/21/17 1900  •  cetirizine (zyrTEC) tablet 5 mg, 5 mg, Oral, Daily, Alex Og MD  •  clopidogrel (PLAVIX) tablet 75 mg, 75 mg, Oral, Daily, Alex Og MD, 75 mg at 03/22/17 0654  •  dicyclomine (BENTYL) tablet 20 mg, 20 mg, Oral, BID, Alex Og MD, 20 mg at 03/21/17 2256  •  I-nancy tablet 1 tablet, 1 tablet, Oral, Daily, Alex Og MD, 1 tablet at 03/21/17 1900  •  isosorbide mononitrate (IMDUR) 24 hr tablet 60 mg, 60 mg, Oral, Daily, Alex Og MD  •  metoprolol succinate XL (TOPROL-XL) 24 hr tablet 50 mg, 50 mg, Oral, Q12H, Alex Og MD, 50 mg at 03/21/17 2553  •  minocycline (MINOCIN,DYNACIN) capsule 100 mg, 100 mg, Oral, Daily, Alex Og MD, 100 mg at 03/21/17 1900  •  nitroglycerin (NITROSTAT) ointment 1 inch, 1 inch, Topical, Once, JAIME Garcia  •  nitroglycerin (NITROSTAT) SL  tablet 0.4 mg, 0.4 mg, Sublingual, Q5 Min PRN, Alex Blanco MD, 0.4 mg at 03/21/17 1914  •  oyster shell calcium tablet 500 mg, 500 mg, Oral, Daily, Alex Og MD, 500 mg at 03/21/17 1745  •  pantoprazole (PROTONIX) EC tablet 40 mg, 40 mg, Oral, Daily, Alex Og MD  •  polyethylene glycol (MIRALAX) powder 17 g, 17 g, Oral, Daily PRN, Alex Og MD  •  ranolazine (RANEXA) 12 hr tablet 500 mg, 500 mg, Oral, Q12H, Alex Og MD, 500 mg at 03/21/17 2256  •  sodium chloride 0.9 % flush 10 mL, 10 mL, Intravenous, PRN, JAIME Garica, 10 mL at 03/21/17 1115  •  sodium chloride 0.9 % infusion, 100 mL/hr, Intravenous, Continuous, Alex Blanco MD, Last Rate: 100 mL/hr at 03/22/17 0854, 100 mL/hr at 03/22/17 0854  •  traMADol (ULTRAM) tablet 50 mg, 50 mg, Oral, Q6H PRN, Alex Og MD, 50 mg at 03/22/17 0054  •  valsartan (DIOVAN) tablet 160 mg, 160 mg, Oral, Daily, Alex Og MD  •  vitamin E capsule 400 Units, 400 Units, Oral, Q12H, Alex Og MD, 400 Units at 03/21/17 2255    amLODIPine 5 mg Oral Q24H   aspirin 325 mg Oral Daily   atorvastatin 20 mg Oral Daily   cetirizine 5 mg Oral Daily   clopidogrel 75 mg Oral Daily   dicyclomine 20 mg Oral BID   I-nancy 1 tablet Oral Daily   isosorbide mononitrate 60 mg Oral Daily   metoprolol succinate XL 50 mg Oral Q12H   minocycline 100 mg Oral Daily   nitroglycerin 1 inch Topical Once   oyster shell calcium 500 mg Oral Daily   pantoprazole 40 mg Oral Daily   ranolazine 500 mg Oral Q12H   valsartan 160 mg Oral Daily   vitamin E 400 Units Oral Q12H     nitroglycerin  •  polyethylene glycol  •  sodium chloride  •  traMADol      Results from last 7 days  Lab Units 03/21/17 2010 03/21/17  1036   WBC 10*3/mm3 5.33 4.77   HEMOGLOBIN g/dL 14.6 14.8   PLATELETS 10*3/mm3 174 166           Results from last 7 days  Lab Units 03/21/17  1036   SODIUM mmol/L 138   POTASSIUM mmol/L 3.7   CHLORIDE mmol/L 105   TOTAL CO2 mmol/L 29.4   BUN mg/dL  20   CREATININE mg/dL 0.99   CALCIUM mg/dL 9.3   GLUCOSE mg/dL 106         Hemoglobin A1C   Date Value Ref Range Status   03/22/2017 5.80 (H) 4.50 - 5.70 % Final       Results from last 7 days  Lab Units 03/21/17  1036   BILIRUBIN mg/dL 0.5   ALK PHOS U/L 75   AST (SGOT) U/L 28   ALT (SGPT) U/L 18       Results from last 7 days  Lab Units 03/22/17  0051 03/21/17 2010 03/21/17  1401   CK TOTAL U/L  --   --  63   TROPONIN I ng/mL <0.006 <0.006 <0.006   CK MB INDEX %  --   --  0.8           Results from last 7 days  Lab Units 03/21/17 2010 03/21/17  1036   INR  0.97 0.93           Imaging Results (last 72 hours)     Procedure Component Value Units Date/Time    XR Chest 1 View [[id number]] Collected:  03/21/17 1040     Updated:  03/21/17 1117    Narrative:       XR CHEST 1 VW-     CLINICAL INDICATION: Chest pain protocol          COMPARISON: 08/10/2016      TECHNIQUE: Single frontal view of the chest.     FINDINGS:     There is no focal alveolar infiltrate or effusion.  The cardiac silhouette is normal. The pulmonary vasculature is  unremarkable.  There is no evidence of an acute osseous abnormality.   There are no suspicious-appearing parenchymal soft tissue nodules.            Impression:       No evidence of active or acute cardiopulmonary disease on today's chest  radiograph.         This report was finalized on 3/21/2017 10:40 AM by Dr. Joshua Chaudhry MD.             Assessment/Plan    Active Problems:    Chest pain         See orders entered.     Flaco Beal MD  03/22/17  10:59 AM

## 2017-03-23 VITALS
HEIGHT: 65 IN | BODY MASS INDEX: 26.67 KG/M2 | SYSTOLIC BLOOD PRESSURE: 139 MMHG | WEIGHT: 160.1 LBS | HEART RATE: 72 BPM | OXYGEN SATURATION: 97 % | TEMPERATURE: 98.3 F | DIASTOLIC BLOOD PRESSURE: 83 MMHG | RESPIRATION RATE: 14 BRPM

## 2017-03-23 PROBLEM — E78.5 HYPERLIPIDEMIA: Status: ACTIVE | Noted: 2017-03-23

## 2017-03-23 PROBLEM — I10 HBP (HIGH BLOOD PRESSURE): Status: ACTIVE | Noted: 2017-03-23

## 2017-03-23 PROBLEM — I25.10 ASHD (ARTERIOSCLEROTIC HEART DISEASE): Status: ACTIVE | Noted: 2017-03-23

## 2017-03-23 LAB
ANION GAP SERPL CALCULATED.3IONS-SCNC: 6 MMOL/L (ref 3.6–11.2)
BUN BLD-MCNC: 20 MG/DL (ref 7–21)
BUN/CREAT SERPL: 19 (ref 7–25)
CALCIUM SPEC-SCNC: 9.2 MG/DL (ref 7.7–10)
CHLORIDE SERPL-SCNC: 109 MMOL/L (ref 99–112)
CHOLEST SERPL-MCNC: 116 MG/DL (ref 0–200)
CO2 SERPL-SCNC: 23 MMOL/L (ref 24.3–31.9)
CREAT BLD-MCNC: 1.05 MG/DL (ref 0.43–1.29)
DEPRECATED RDW RBC AUTO: 44.4 FL (ref 37–54)
ERYTHROCYTE [DISTWIDTH] IN BLOOD BY AUTOMATED COUNT: 13.3 % (ref 11.5–14.5)
GFR SERPL CREATININE-BSD FRML MDRD: 51 ML/MIN/1.73
GLUCOSE BLD-MCNC: 100 MG/DL (ref 70–110)
HCT VFR BLD AUTO: 41.5 % (ref 37–47)
HDLC SERPL-MCNC: 34 MG/DL (ref 60–100)
HGB BLD-MCNC: 13.8 G/DL (ref 12–16)
LDLC SERPL CALC-MCNC: 55 MG/DL (ref 0–100)
LDLC/HDLC SERPL: 1.63 {RATIO}
MCH RBC QN AUTO: 30.7 PG (ref 27–33)
MCHC RBC AUTO-ENTMCNC: 33.3 G/DL (ref 33–37)
MCV RBC AUTO: 92.2 FL (ref 80–94)
OSMOLALITY SERPL CALC.SUM OF ELEC: 278.4 MOSM/KG (ref 273–305)
PLATELET # BLD AUTO: 171 10*3/MM3 (ref 130–400)
PMV BLD AUTO: 9.9 FL (ref 6–10)
POTASSIUM BLD-SCNC: 4.2 MMOL/L (ref 3.5–5.3)
RBC # BLD AUTO: 4.5 10*6/MM3 (ref 4.2–5.4)
SODIUM BLD-SCNC: 138 MMOL/L (ref 135–153)
T4 SERPL-MCNC: 7.3 MCG/DL (ref 4.5–10.9)
TRIGL SERPL-MCNC: 133 MG/DL (ref 0–150)
TSH SERPL DL<=0.05 MIU/L-ACNC: 3.81 MIU/ML (ref 0.55–4.78)
VLDLC SERPL-MCNC: 26.6 MG/DL
WBC NRBC COR # BLD: 5.65 10*3/MM3 (ref 4.5–12.5)

## 2017-03-23 PROCEDURE — 84443 ASSAY THYROID STIM HORMONE: CPT | Performed by: INTERNAL MEDICINE

## 2017-03-23 PROCEDURE — 93005 ELECTROCARDIOGRAM TRACING: CPT | Performed by: INTERNAL MEDICINE

## 2017-03-23 PROCEDURE — 84436 ASSAY OF TOTAL THYROXINE: CPT | Performed by: INTERNAL MEDICINE

## 2017-03-23 PROCEDURE — 99225 PR SBSQ OBSERVATION CARE/DAY 25 MINUTES: CPT | Performed by: INTERNAL MEDICINE

## 2017-03-23 PROCEDURE — 80061 LIPID PANEL: CPT | Performed by: INTERNAL MEDICINE

## 2017-03-23 PROCEDURE — 85027 COMPLETE CBC AUTOMATED: CPT | Performed by: INTERNAL MEDICINE

## 2017-03-23 PROCEDURE — 93010 ELECTROCARDIOGRAM REPORT: CPT | Performed by: INTERNAL MEDICINE

## 2017-03-23 PROCEDURE — G0378 HOSPITAL OBSERVATION PER HR: HCPCS

## 2017-03-23 PROCEDURE — 80048 BASIC METABOLIC PNL TOTAL CA: CPT | Performed by: INTERNAL MEDICINE

## 2017-03-23 RX ORDER — ATORVASTATIN CALCIUM 40 MG/1
80 TABLET, FILM COATED ORAL DAILY
Status: DISCONTINUED | OUTPATIENT
Start: 2017-03-23 | End: 2017-03-23 | Stop reason: HOSPADM

## 2017-03-23 RX ORDER — ATORVASTATIN CALCIUM 80 MG/1
80 TABLET, FILM COATED ORAL DAILY
Qty: 30 TABLET | Refills: 5 | Status: SHIPPED | OUTPATIENT
Start: 2017-03-23 | End: 2022-01-01

## 2017-03-23 RX ORDER — ATORVASTATIN CALCIUM 40 MG/1
40 TABLET, FILM COATED ORAL DAILY
Status: DISCONTINUED | OUTPATIENT
Start: 2017-03-23 | End: 2017-03-23

## 2017-03-23 RX ORDER — NITROGLYCERIN 0.4 MG/1
0.4 TABLET SUBLINGUAL
Qty: 25 TABLET | Refills: 12 | Status: SHIPPED | OUTPATIENT
Start: 2017-03-23 | End: 2022-01-01

## 2017-03-23 RX ADMIN — CLOPIDOGREL 75 MG: 75 TABLET, FILM COATED ORAL at 09:22

## 2017-03-23 RX ADMIN — ASPIRIN 325 MG: 325 TABLET ORAL at 09:23

## 2017-03-23 RX ADMIN — METOPROLOL SUCCINATE 50 MG: 50 TABLET, FILM COATED, EXTENDED RELEASE ORAL at 09:21

## 2017-03-23 RX ADMIN — CETIRIZINE HYDROCHLORIDE 5 MG: 10 TABLET ORAL at 09:22

## 2017-03-23 RX ADMIN — PANTOPRAZOLE SODIUM 40 MG: 40 TABLET, DELAYED RELEASE ORAL at 09:20

## 2017-03-23 RX ADMIN — I-VITE, TAB 1000-60-2MG (60/BT) 1 TABLET: TAB at 09:21

## 2017-03-23 RX ADMIN — VITAMIN E CAP 400 UNIT 400 UNITS: 400 CAP at 09:20

## 2017-03-23 RX ADMIN — ISOSORBIDE MONONITRATE 60 MG: 60 TABLET, EXTENDED RELEASE ORAL at 09:21

## 2017-03-23 RX ADMIN — Medication 500 MG: at 09:20

## 2017-03-23 RX ADMIN — RANOLAZINE 500 MG: 500 TABLET, FILM COATED, EXTENDED RELEASE ORAL at 09:23

## 2017-03-23 RX ADMIN — ATORVASTATIN CALCIUM 80 MG: 40 TABLET, FILM COATED ORAL at 09:25

## 2017-03-23 RX ADMIN — VALSARTAN 160 MG: 160 TABLET, FILM COATED ORAL at 09:20

## 2017-03-23 RX ADMIN — MINOCYCLINE HYDROCHLORIDE 100 MG: 50 CAPSULE ORAL at 09:20

## 2017-03-23 RX ADMIN — AMLODIPINE BESYLATE 5 MG: 5 TABLET ORAL at 09:23

## 2017-03-23 RX ADMIN — TRAMADOL HYDROCHLORIDE 50 MG: 50 TABLET, FILM COATED ORAL at 06:15

## 2017-03-23 NOTE — DISCHARGE INSTR - LAB
SEE DR. BENJAMIN April 21 AT 10:00 AM. OFFICE NUMBER [id number]. HIS OFFICE IS ON THE 2ND FLOOR OF THE MEDICAL OFFICE BUILDING.

## 2017-03-23 NOTE — PROGRESS NOTES
LOS: 0 days   Patient Care Team:  Flaco Beal MD as PCP - General (Internal Medicine)    Chief Complaint:Eloina Batista a 77 y.o. female presents with a history of known coronary artery disease and recurrent chest discomfort with dynamic ST changes.  She underwent cardiac catheterization today which demonstrated a right dominant coronary artery system with patent stents in the circumflex and right coronary arteries.  The internal mammary artery graft to the LAD was patent.  The vein graft to the diagonal branch had in-stent restenosis and underwent cutting balloon angioplasty       Interval History: She has had no further chest pain      Objective   Vital Signs  Temp:  [98.1 °F (36.7 °C)-98.3 °F (36.8 °C)] 98.3 °F (36.8 °C)  Heart Rate:  [58-81] 77  Resp:  [12-16] 14  BP: (113-185)/(65-97) 137/84  Arterial Line BP: (144-181)/(70-83) 181/83    Intake/Output Summary (Last 24 hours) at 03/23/17 0657  Last data filed at 03/22/17 1900   Gross per 24 hour   Intake           851.67 ml   Output              900 ml   Net           -48.33 ml       Comfortable NAD  PERRL, conjunctiva clear  Neck supple, no JVD or thyromegaly appreciated  S1/S2 RRR, no m/r/g  Lungs CTA B, normal effort  Abdomen S/NT/ND (+) BS, no HSM appreciated  Extremities warm, no clubbing, cyanosis, or edema  Normal gait  No visible or palpable skin lesions  A/Ox4, mood and affect appropriate  Site of cardiac catheterization is well-healed    Results Review:        Results from last 7 days  Lab Units 03/23/17  0055 03/21/17  1036   SODIUM mmol/L 138 138   POTASSIUM mmol/L 4.2 3.7   CHLORIDE mmol/L 109 105   TOTAL CO2 mmol/L 23.0* 29.4   BUN mg/dL 20 20   CREATININE mg/dL 1.05 0.99   GLUCOSE mg/dL 100 106   CALCIUM mg/dL 9.2 9.3       Results from last 7 days  Lab Units 03/22/17  0051 03/21/17 2010 03/21/17  1401   CK TOTAL U/L  --   --  63   TROPONIN I ng/mL <0.006 <0.006 <0.006   CK MB INDEX %  --   --  0.8       Results from last 7 days  Lab  Units 03/23/17  0055 03/21/17 2010 03/21/17  1036   WBC 10*3/mm3 5.65 5.33 4.77   HEMOGLOBIN g/dL 13.8 14.6 14.8   HEMATOCRIT % 41.5 42.8 43.3   PLATELETS 10*3/mm3 171 174 166       Results from last 7 days  Lab Units 03/22/17  0510 03/21/17 2010 03/21/17  1036   INR   --  0.97 0.93   APTT seconds >100.0* 28.3  --        Results from last 7 days  Lab Units 03/23/17  0055   CHOLESTEROL mg/dL 116           Results from last 7 days  Lab Units 03/23/17  0055   CHOLESTEROL mg/dL 116   TRIGLYCERIDES mg/dL 133   HDL CHOL mg/dL 34*       I reviewed the patient's new clinical results.  I personally viewed and interpreted the patient's EKG/Telemetry data        Medication Review:     amLODIPine 5 mg Oral Q24H   aspirin 325 mg Oral Daily   atorvastatin 20 mg Oral Daily   cetirizine 5 mg Oral Daily   clopidogrel 75 mg Oral Daily   dicyclomine 20 mg Oral BID   I-nancy 1 tablet Oral Daily   isosorbide mononitrate 60 mg Oral Daily   metoprolol succinate XL 50 mg Oral Q12H   minocycline 100 mg Oral Daily   Morphine 2 mg Intravenous Once   nitroglycerin 1 inch Topical Once   oyster shell calcium 500 mg Oral Daily   pantoprazole 40 mg Oral Daily   ranolazine 500 mg Oral Q12H   valsartan 160 mg Oral Daily   vitamin E 400 Units Oral Q12H         sodium chloride 100 mL/hr Last Rate: 100 mL/hr (03/22/17 0854)       Active Problems:    Chest pain        Assessment/Plan   Coronary artery disease  The patient is status post percutaneous revascularization of the vein graft to the diagonal branch.  Stable overnight.  She can be discharged to home and I have asked her to follow-up with me in the office in 2 weeks.      Alex Blanco MD  03/23/17  6:57 AM

## 2017-03-23 NOTE — PLAN OF CARE
Problem: Patient Care Overview (Adult)  Goal: Plan of Care Review  Outcome: Outcome(s) achieved Date Met:  03/23/17  Goal: Discharge Needs Assessment  Outcome: Outcome(s) achieved Date Met:  03/23/17    Problem: Pain, Acute (Adult)  Goal: Identify Related Risk Factors and Signs and Symptoms  Outcome: Outcome(s) achieved Date Met:  03/23/17  Goal: Acceptable Pain Control/Comfort Level  Outcome: Outcome(s) achieved Date Met:  03/23/17

## 2017-03-23 NOTE — PROGRESS NOTES
LOS: 0 days   Interval History: Awake and alert. No chest pain or dyspnea. No leg pain. Walkeed w/o difficulty. No other changes. Home today.Intensive statins therapy and risk factor modification.      History taken from: patient chart    Review of Systems:     Review of Systems - Negative except present illness    Objective     Vital Signs  Temp:  [98.1 °F (36.7 °C)-98.3 °F (36.8 °C)] 98.3 °F (36.8 °C)  Heart Rate:  [58-81] 77  Resp:  [12-16] 14  BP: (113-185)/(65-97) 137/84  Arterial Line BP: (144-181)/(70-83) 181/83    Physical Exam:    HEENT; Neg  Resp: Clear  HEART: RRR  Abd: negd  Neur: Normal,nonfocal.          Results Review:     I reviewed the patient's new clinical results  : See Below    Medication Review:     Current Facility-Administered Medications:   •  amLODIPine (NORVASC) tablet 5 mg, 5 mg, Oral, Q24H, Alxe Og MD, 5 mg at 03/22/17 1530  •  aspirin tablet 325 mg, 325 mg, Oral, Daily, Alex Og MD, 325 mg at 03/22/17 0654  •  atorvastatin (LIPITOR) tablet 20 mg, 20 mg, Oral, Daily, Alex Og MD, 20 mg at 03/22/17 1525  •  cetirizine (zyrTEC) tablet 5 mg, 5 mg, Oral, Daily, Alex Og MD, 5 mg at 03/22/17 1524  •  clopidogrel (PLAVIX) tablet 75 mg, 75 mg, Oral, Daily, Alex Og MD, 75 mg at 03/22/17 0654  •  dicyclomine (BENTYL) tablet 20 mg, 20 mg, Oral, BID, Alex Og MD, 20 mg at 03/22/17 2104  •  I-nancy tablet 1 tablet, 1 tablet, Oral, Daily, Alex Og MD, 1 tablet at 03/22/17 1525  •  isosorbide mononitrate (IMDUR) 24 hr tablet 60 mg, 60 mg, Oral, Daily, Alex Og MD, 60 mg at 03/22/17 1524  •  metoprolol succinate XL (TOPROL-XL) 24 hr tablet 50 mg, 50 mg, Oral, Q12H, Alex Og MD, 50 mg at 03/22/17 2104  •  minocycline (MINOCIN,DYNACIN) capsule 100 mg, 100 mg, Oral, Daily, Alex Og MD, 100 mg at 03/22/17 1524  •  morphine injection 2 mg, 2 mg, Intravenous, Once, Alex Blanco MD  •  nitroglycerin (NITROSTAT) ointment  1 inch, 1 inch, Topical, Once, JAIME Garcia  •  nitroglycerin (NITROSTAT) SL tablet 0.4 mg, 0.4 mg, Sublingual, Q5 Min PRN, Alex Blanco MD, 0.4 mg at 03/21/17 1914  •  oyster shell calcium tablet 500 mg, 500 mg, Oral, Daily, Alex gO MD, 500 mg at 03/22/17 1523  •  pantoprazole (PROTONIX) EC tablet 40 mg, 40 mg, Oral, Daily, Alex Og MD, 40 mg at 03/22/17 1530  •  polyethylene glycol (MIRALAX) powder 17 g, 17 g, Oral, Daily PRN, Alex Og MD  •  ranolazine (RANEXA) 12 hr tablet 500 mg, 500 mg, Oral, Q12H, Alex Og MD, 500 mg at 03/22/17 2104  •  sodium chloride 0.9 % flush 10 mL, 10 mL, Intravenous, PRN, JAIME Garcia, 10 mL at 03/21/17 1115  •  sodium chloride 0.9 % infusion, 100 mL/hr, Intravenous, Continuous, Alex Blanco MD, Last Rate: 100 mL/hr at 03/22/17 0854, 100 mL/hr at 03/22/17 0854  •  traMADol (ULTRAM) tablet 50 mg, 50 mg, Oral, Q6H PRN, Alex Og MD, 50 mg at 03/23/17 0615  •  valsartan (DIOVAN) tablet 160 mg, 160 mg, Oral, Daily, Alex Og MD, 160 mg at 03/22/17 1523  •  vitamin E capsule 400 Units, 400 Units, Oral, Q12H, Alex Og MD, 400 Units at 03/22/17 2104    amLODIPine 5 mg Oral Q24H   aspirin 325 mg Oral Daily   atorvastatin 20 mg Oral Daily   cetirizine 5 mg Oral Daily   clopidogrel 75 mg Oral Daily   dicyclomine 20 mg Oral BID   I-nancy 1 tablet Oral Daily   isosorbide mononitrate 60 mg Oral Daily   metoprolol succinate XL 50 mg Oral Q12H   minocycline 100 mg Oral Daily   Morphine 2 mg Intravenous Once   nitroglycerin 1 inch Topical Once   oyster shell calcium 500 mg Oral Daily   pantoprazole 40 mg Oral Daily   ranolazine 500 mg Oral Q12H   valsartan 160 mg Oral Daily   vitamin E 400 Units Oral Q12H     nitroglycerin  •  polyethylene glycol  •  sodium chloride  •  traMADol      Results from last 7 days  Lab Units 03/23/17  0055 03/21/17 2010 03/21/17  1036   WBC 10*3/mm3 5.65 5.33 4.77   HEMOGLOBIN g/dL 13.8 14.6 14.8    PLATELETS 10*3/mm3 171 174 166           Results from last 7 days  Lab Units 03/23/17  0055 03/21/17  1036   SODIUM mmol/L 138 138   POTASSIUM mmol/L 4.2 3.7   CHLORIDE mmol/L 109 105   TOTAL CO2 mmol/L 23.0* 29.4   BUN mg/dL 20 20   CREATININE mg/dL 1.05 0.99   CALCIUM mg/dL 9.2 9.3   GLUCOSE mg/dL 100 106         Hemoglobin A1C   Date Value Ref Range Status   03/22/2017 5.80 (H) 4.50 - 5.70 % Final       Results from last 7 days  Lab Units 03/21/17  1036   BILIRUBIN mg/dL 0.5   ALK PHOS U/L 75   AST (SGOT) U/L 28   ALT (SGPT) U/L 18       Results from last 7 days  Lab Units 03/22/17  0051 03/21/17 2010 03/21/17  1401   CK TOTAL U/L  --   --  63   TROPONIN I ng/mL <0.006 <0.006 <0.006   CK MB INDEX %  --   --  0.8           Results from last 7 days  Lab Units 03/21/17 2010 03/21/17  1036   INR  0.97 0.93           Imaging Results (last 72 hours)     Procedure Component Value Units Date/Time    XR Chest 1 View [[id number]] Collected:  03/21/17 1040     Updated:  03/21/17 1117    Narrative:       XR CHEST 1 VW-     CLINICAL INDICATION: Chest pain protocol          COMPARISON: 08/10/2016      TECHNIQUE: Single frontal view of the chest.     FINDINGS:     There is no focal alveolar infiltrate or effusion.  The cardiac silhouette is normal. The pulmonary vasculature is  unremarkable.  There is no evidence of an acute osseous abnormality.   There are no suspicious-appearing parenchymal soft tissue nodules.            Impression:       No evidence of active or acute cardiopulmonary disease on today's chest  radiograph.         This report was finalized on 3/21/2017 10:40 AM by Dr. Joshua Chaudhry MD.             Assessment/Plan    Active Problems:    Chest pain         See orders entered.     Flaco Beal MD  03/23/17  7:31 AM

## 2017-03-24 NOTE — DISCHARGE SUMMARY
Date of Discharge 3/23/2017    Discharge Diagnosis:   Active Problems:    Chest pain    ASHD (arteriosclerotic heart disease)    HBP (high blood pressure)    Hyperlipidemia      Presenting Problem/History of Present Illness  See History and Physical for Presenting Problems / Illnesses.       Hospital Course  Patient is a 77 y.o. female presented with symptoms consistent with unstable angina. She was treated per protocol and monitored on PCU.  MI was ruled out. She had cardiac consultation and cardiac cath with results as noted. She had resolution of her symptoms following her cutting angioplasty for instent stenosis. She had no complications and was discharged on her meds and high intensity statin. Cardic rehab will be considered.    Procedures Performed  Procedure(s):  Left Heart Cath       Consults:   Consults     Date and Time Order Name Status Description    3/21/2017 1147 Cardiology (on-call MD unless specified) Completed           Pertinent Test Results:    Imaging Results (last 72 hours)     Procedure Component Value Units Date/Time    XR Chest 1 View [[id number]] Collected:  03/21/17 1040     Updated:  03/21/17 1117    Narrative:       XR CHEST 1 VW-     CLINICAL INDICATION: Chest pain protocol          COMPARISON: 08/10/2016      TECHNIQUE: Single frontal view of the chest.     FINDINGS:     There is no focal alveolar infiltrate or effusion.  The cardiac silhouette is normal. The pulmonary vasculature is  unremarkable.  There is no evidence of an acute osseous abnormality.   There are no suspicious-appearing parenchymal soft tissue nodules.            Impression:       No evidence of active or acute cardiopulmonary disease on today's chest  radiograph.         This report was finalized on 3/21/2017 10:40 AM by Dr. Joshua Chaudhry MD.           Lab Results (last 72 hours)     Procedure Component Value Units Date/Time    CBC & Differential [[id number]] Collected:  03/21/17 1036    Specimen:  Blood Updated:   03/21/17 1053    Narrative:       The following orders were created for panel order CBC & Differential.  Procedure                               Abnormality         Status                     ---------                               -----------         ------                     CBC Auto Differential[[id number]]         Normal              Final result                 Please view results for these tests on the individual orders.    CBC Auto Differential [[id number]]  (Normal) Collected:  03/21/17 1036    Specimen:  Blood Updated:  03/21/17 1053     WBC 4.77 10*3/mm3      RBC 4.77 10*6/mm3      Hemoglobin 14.8 g/dL      Hematocrit 43.3 %      MCV 90.8 fL      MCH 31.0 pg      MCHC 34.2 g/dL      RDW 13.0 %      RDW-SD 43.0 fl      MPV 9.9 fL      Platelets 166 10*3/mm3      Neutrophil % 57.3 %      Lymphocyte % 30.6 %      Monocyte % 11.5 %      Eosinophil % 0.2 %      Basophil % 0.2 %      Immature Grans % 0.2 %      Neutrophils, Absolute 2.73 10*3/mm3      Lymphocytes, Absolute 1.46 10*3/mm3      Monocytes, Absolute 0.55 10*3/mm3      Eosinophils, Absolute 0.01 10*3/mm3      Basophils, Absolute 0.01 10*3/mm3      Immature Grans, Absolute 0.01 10*3/mm3     Protime-INR [[id number]]  (Normal) Collected:  03/21/17 1036    Specimen:  Blood Updated:  03/21/17 1102     Protime 10.3 Seconds      INR 0.93    Narrative:       Patients not on anticoagulant therapy:    INR 0.90-1.10     Suggested INR therapeutic range for stable oral anticoagulant therapy:             Routine therapy                      2.00-3.00           Recurrent MI                         2.50-3.50           Mechanical prosthetic valve          2.50-3.50    Comprehensive Metabolic Panel [[id number]]  (Abnormal) Collected:  03/21/17 1036    Specimen:  Blood Updated:  03/21/17 1106     Glucose 106 mg/dL      BUN 20 mg/dL      Creatinine 0.99 mg/dL      Sodium 138 mmol/L      Potassium 3.7 mmol/L      Chloride 105 mmol/L      CO2 29.4 mmol/L      Calcium 9.3 mg/dL       Total Protein 7.3 g/dL      Albumin 4.10 g/dL      ALT (SGPT) 18 U/L      AST (SGOT) 28 U/L      Alkaline Phosphatase 75 U/L       Note New Reference Ranges        Total Bilirubin 0.5 mg/dL      eGFR Non African Amer 55 (L) mL/min/1.73      Globulin 3.2 gm/dL      A/G Ratio 1.3 (L) g/dL      BUN/Creatinine Ratio 20.2     Anion Gap 3.6 mmol/L     Narrative:       The MDRD GFR formula is only valid for adults with stable renal function between ages 18 and 70.    Osmolality, Calculated [[id number]]  (Normal) Collected:  03/21/17 1036    Specimen:  Blood Updated:  03/21/17 1106     Osmolality Calc 278.7 mOsm/kg     Troponin [[id number]]  (Normal) Collected:  03/21/17 1036    Specimen:  Blood Updated:  03/21/17 1113     Troponin I <0.006 ng/mL     Narrative:       Ultra Troponin I Reference Range:         <=0.039 ng/mL: Negative    0.04-0.779 ng/mL: Indeterminate Range. Suspicious of MI.  Clinical correlation required.       >=0.78  ng/mL: Consistent with myocardial injury.  Clinical correlation required.    CK [[id number]]  (Normal) Collected:  03/21/17 1401    Specimen:  Blood from Arm, Right Updated:  03/21/17 1428     Creatine Kinase 63 U/L     Troponin [[id number]]  (Normal) Collected:  03/21/17 1401    Specimen:  Blood from Arm, Right Updated:  03/21/17 1439     Troponin I <0.006 ng/mL     Narrative:       Ultra Troponin I Reference Range:         <=0.039 ng/mL: Negative    0.04-0.779 ng/mL: Indeterminate Range. Suspicious of MI.  Clinical correlation required.       >=0.78  ng/mL: Consistent with myocardial injury.  Clinical correlation required.    CK-MB [[id number]]  (Normal) Collected:  03/21/17 1401    Specimen:  Blood from Arm, Right Updated:  03/21/17 1439     CKMB 0.51 ng/mL     CK-MB Index [[id number]]  (Normal) Collected:  03/21/17 1401    Specimen:  Blood from Arm, Right Updated:  03/21/17 1439     CK-MB Index 0.8 %     [place] Draw [[id number]] Collected:  03/21/17 1036    Specimen:  Blood Updated:  03/21/17 1506     Narrative:       The following orders were created for panel order [place] Draw.  Procedure                               Abnormality         Status                     ---------                               -----------         ------                     Light Blue Top[[id number]]                                    Final result               Green Top (Gel)[[id number]]                                   Final result               Lavender Top[[id number]]                                      Final result               Gold Top - SST[[id number]]                                    Final result                 Please view results for these tests on the individual orders.    Light Blue Top [[id number]] Collected:  03/21/17 1036    Specimen:  Blood Updated:  03/21/17 1501     Extra Tube hold for add-on      Auto resulted       Green Top (Gel) [[id number]] Collected:  03/21/17 1036    Specimen:  Blood Updated:  03/21/17 1501     Extra Tube Hold for add-ons.      Auto resulted.       Lavender Top [[id number]] Collected:  03/21/17 1036    Specimen:  Blood Updated:  03/21/17 1501     Extra Tube hold for add-on      Auto resulted       Gold Top - SST [[id number]] Collected:  03/21/17 1036    Specimen:  Blood Updated:  03/21/17 1501     Extra Tube Hold for add-ons.      Auto resulted.       CBC & Differential [[id number]] Collected:  03/21/17 2010    Specimen:  Blood Updated:  03/21/17 2024    Narrative:       The following orders were created for panel order CBC & Differential.  Procedure                               Abnormality         Status                     ---------                               -----------         ------                     CBC Auto Differential[[id number]]         Normal              Final result                 Please view results for these tests on the individual orders.    CBC Auto Differential [[id number]]  (Normal) Collected:  03/21/17 2010    Specimen:  Blood Updated:  03/21/17 2024     WBC 5.33 10*3/mm3      RBC  4.71 10*6/mm3      Hemoglobin 14.6 g/dL      Hematocrit 42.8 %      MCV 90.9 fL      MCH 31.0 pg      MCHC 34.1 g/dL      RDW 13.0 %      RDW-SD 42.5 fl      MPV 9.7 fL      Platelets 174 10*3/mm3      Neutrophil % 52.7 %      Lymphocyte % 37.9 %      Monocyte % 9.2 %      Eosinophil % 0.0 %      Basophil % 0.2 %      Immature Grans % 0.0 %      Neutrophils, Absolute 2.81 10*3/mm3      Lymphocytes, Absolute 2.02 10*3/mm3      Monocytes, Absolute 0.49 10*3/mm3      Eosinophils, Absolute 0.00 10*3/mm3      Basophils, Absolute 0.01 10*3/mm3      Immature Grans, Absolute 0.00 10*3/mm3     Lipid Panel [[id number]]  (Abnormal) Collected:  03/21/17 2010    Specimen:  Blood Updated:  03/21/17 2042     Total Cholesterol 131 mg/dL      Triglycerides 140 mg/dL      HDL Cholesterol 44 (L) mg/dL      LDL Cholesterol  59 mg/dL      VLDL Cholesterol 28 mg/dL      LDL/HDL Ratio 1.34    Narrative:       Cholesterol Reference Ranges:   Desirable       < 200 mg/dL   Borderline    200-239 mg/dL   High Risk       > 239 mg/dL    Triglyceride Reference Ranges:   Normal          < 150 mg/dL   Borderline    150-199 mg/dL   High          200-499 mg/dL   Very High       > 499 mg/dL    HDL Reference Ranges:   Low              < 40 mg/dL   High             > 59 mg/dL    LDL Reference Ranges:   Optimal         < 100 mg/dL   Near Optimal  100-129 mg/dL   Borderline    130-159 mg/dL   High          160-189 mg/dL   Very High       > 189 mg/dL    D-dimer, Quantitative [[id number]]  (Normal) Collected:  03/21/17 2010    Specimen:  Blood Updated:  03/21/17 2046     D-Dimer, Quantitative 0.34 mg/L (FEU)     Narrative:       d-Dimer assay result is to be used in conjunction with a non-high clinical pretest probability (PTP) assessment tool to exclude PE and aid in diagnosis of VTE with cutoff of 0.5 mg/L FEU.    Protime-INR [[id number]]  (Normal) Collected:  03/21/17 2010    Specimen:  Blood Updated:  03/21/17 2046     Protime 10.7 Seconds      INR 0.97     Narrative:       Patients not on anticoagulant therapy:    INR 0.90-1.10     Suggested INR therapeutic range for stable oral anticoagulant therapy:             Routine therapy                      2.00-3.00           Recurrent MI                         2.50-3.50           Mechanical prosthetic valve          2.50-3.50    aPTT [[id number]]  (Normal) Collected:  03/21/17 2010    Specimen:  Blood Updated:  03/21/17 2046     PTT 28.3 seconds     Narrative:       Heparin protocol:    Therapeutic range 56-80 seconds    Troponin [[id number]]  (Normal) Collected:  03/21/17 2010    Specimen:  Blood Updated:  03/21/17 2051     Troponin I <0.006 ng/mL     Narrative:       Ultra Troponin I Reference Range:         <=0.039 ng/mL: Negative    0.04-0.779 ng/mL: Indeterminate Range. Suspicious of MI.  Clinical correlation required.       >=0.78  ng/mL: Consistent with myocardial injury.  Clinical correlation required.    Troponin [[id number]]  (Normal) Collected:  03/22/17 0051    Specimen:  Blood Updated:  03/22/17 0129     Troponin I <0.006 ng/mL     Narrative:       Ultra Troponin I Reference Range:         <=0.039 ng/mL: Negative    0.04-0.779 ng/mL: Indeterminate Range. Suspicious of MI.  Clinical correlation required.       >=0.78  ng/mL: Consistent with myocardial injury.  Clinical correlation required.    aPTT [[id number]]  (Abnormal) Collected:  03/22/17 0510    Specimen:  Blood Updated:  03/22/17 0538     PTT >100.0 (C) seconds     Narrative:       Heparin protocol:    Therapeutic range 56-80 seconds    POC Activated Clotting Time [[id number]]  (Abnormal) Collected:  03/22/17 0632    Specimen:  Blood Updated:  03/22/17 0855     Activated Clotting Time  157 (H) Seconds       Serial Number: [id number]    : [id number]       POC Activated Clotting Time [[id number]]  (Abnormal) Collected:  03/22/17 0712    Specimen:  Blood Updated:  03/22/17 0855     Activated Clotting Time  229 (H) Seconds       Serial Number: [id number]    : [id number]        POC Activated Clotting Time [[id number]]  (Abnormal) Collected:  03/22/17 0823    Specimen:  Blood Updated:  03/22/17 0940     Activated Clotting Time  178 (H) Seconds       Serial Number: [id number]    : [id number]       Hemoglobin A1c [[id number]]  (Abnormal) Collected:  03/22/17 0510    Specimen:  Blood Updated:  03/22/17 1017     Hemoglobin A1C 5.80 (H) %     POC Activated Clotting Time [[id number]]  (Abnormal) Collected:  03/22/17 0931    Specimen:  Blood Updated:  03/22/17 1315     Activated Clotting Time  157 (H) Seconds       Serial Number: [id number]    : [id number]       CBC (No Diff) [[id number]]  (Normal) Collected:  03/23/17 0055    Specimen:  Blood Updated:  03/23/17 0132     WBC 5.65 10*3/mm3      RBC 4.50 10*6/mm3      Hemoglobin 13.8 g/dL      Hematocrit 41.5 %      MCV 92.2 fL      MCH 30.7 pg      MCHC 33.3 g/dL      RDW 13.3 %      RDW-SD 44.4 fl      MPV 9.9 fL      Platelets 171 10*3/mm3     TSH [[id number]]  (Normal) Collected:  03/23/17 0055    Specimen:  Blood Updated:  03/23/17 0144     TSH 3.805 mIU/mL     T4 [[id number]]  (Normal) Collected:  03/23/17 0055    Specimen:  Blood Updated:  03/23/17 0144     T4, Total 7.30 mcg/dL     Basic Metabolic Panel [[id number]]  (Abnormal) Collected:  03/23/17 0055    Specimen:  Blood Updated:  03/23/17 0146     Glucose 100 mg/dL      BUN 20 mg/dL      Creatinine 1.05 mg/dL      Sodium 138 mmol/L      Potassium 4.2 mmol/L      Chloride 109 mmol/L      CO2 23.0 (L) mmol/L      Calcium 9.2 mg/dL      eGFR Non African Amer 51 (L) mL/min/1.73      BUN/Creatinine Ratio 19.0     Anion Gap 6.0 mmol/L     Narrative:       The MDRD GFR formula is only valid for adults with stable renal function between ages 18 and 70.    Lipid Panel [[id number]]  (Abnormal) Collected:  03/23/17 0055    Specimen:  Blood Updated:  03/23/17 0146     Total Cholesterol 116 mg/dL      Triglycerides 133 mg/dL      HDL Cholesterol 34 (L) mg/dL      LDL Cholesterol  55 mg/dL      VLDL Cholesterol  26.6 mg/dL      LDL/HDL Ratio 1.63    Narrative:       Cholesterol Reference Ranges:   Desirable       < 200 mg/dL   Borderline    200-239 mg/dL   High Risk       > 239 mg/dL    Triglyceride Reference Ranges:   Normal          < 150 mg/dL   Borderline    150-199 mg/dL   High          200-499 mg/dL   Very High       > 499 mg/dL    HDL Reference Ranges:   Low              < 40 mg/dL   High             > 59 mg/dL    LDL Reference Ranges:   Optimal         < 100 mg/dL   Near Optimal  100-129 mg/dL   Borderline    130-159 mg/dL   High          160-189 mg/dL   Very High       > 189 mg/dL    Osmolality, Calculated [[id number]]  (Normal) Collected:  03/23/17 0055    Specimen:  Blood Updated:  03/23/17 0146     Osmolality Calc 278.4 mOsm/kg           Condition on Discharge:   Fair  Vital Signs  Heart Rate:  [64-72] 72  BP: (139-142)/(77-83) 139/83    Physical Exam:  HEENT; Neg  Resp: Clear  HEART: RRR  Abd: negd  Neur: Normal,nonfocal.      Discharge Disposition  Home or Self Care    Discharge Medications   Eloina Batista   Home Medication Instructions MARISOL:[id number]    Printed on:03/24/17 0646   Medication Information                      amLODIPine (NORVASC) 5 MG tablet  Take 5 mg by mouth daily.             aspirin 325 MG tablet  Take 325 mg by mouth Daily.             atorvastatin (LIPITOR) 80 MG tablet  Take 1 tablet by mouth Daily. For cholesterol             calcium carbonate (OS-SAM) 600 MG tablet  Take 600 mg by mouth Daily.             cetirizine (zyrTEC) 10 MG tablet  Take 10 mg by mouth Daily.             clopidogrel (PLAVIX) 75 MG tablet  Take 75 mg by mouth daily.             dicyclomine (BENTYL) 20 MG tablet  Take 20 mg by mouth 2 (Two) Times a Day. Noon and bedtime             estrogens, conjugated, (PREMARIN) 0.3 MG tablet  Take 0.3 mg by mouth Daily.             isosorbide mononitrate (IMDUR) 60 MG 24 hr tablet  Take 60 mg by mouth Daily.             metoprolol succinate XL (TOPROL XL) 50 MG 24 hr  tablet  Take 50 mg by mouth 2 (Two) Times a Day.             minocycline (MINOCIN,DYNACIN) 100 MG capsule  Take 100 mg by mouth Daily.             Multiple Vitamins-Minerals (I-MEME) tablet tablet  Take 1 tablet by mouth Daily.             Multiple Vitamins-Minerals (MACULAR VITAMIN BENEFIT) tablet  Take 1 tablet by mouth Daily.             nitroglycerin (NITROSTAT) 0.4 MG SL tablet  Place 1 tablet under the tongue Every 5 (Five) Minutes As Needed for Chest Pain. Take no more than 3 doses in 15 minutes.             pantoprazole (PROTONIX) 40 MG EC tablet  Take 40 mg by mouth daily.             polyethylene glycol (MIRALAX) packet  Take 17 g by mouth Daily As Needed.             ranolazine (RANEXA) 500 MG 12 hr tablet  Take 500 mg by mouth 2 (two) times a day.             traMADol (ULTRAM) 50 MG tablet  Take 50 mg by mouth every 6 (six) hours as needed for moderate pain (4-6).             valsartan (DIOVAN) 160 MG tablet  Take 160 mg by mouth Daily.             vitamin E 400 UNIT capsule  Take 400 Units by mouth 2 (Two) Times a Day.                 Discharge Diet:   Diet Instructions     Diet: Cardiac; Thin Liquids, No Restrictions       Discharge Diet:  Cardiac   Fluid Consistency:  Thin Liquids, No Restrictions                 Activity at Discharge:   Activity Instructions     Discharge Activity Restrictions       Per Cardiology routine           Additional Activity Instructions:      PLEASE ATTEND  CARDIAC REHAB.                  Follow-up Appointments  Future Appointments  Date Time Provider Department Center   4/21/2017 10:00 AM Alex Blanco MD [facility] None     Additional Instructions for the Follow-ups that You Need to Schedule     Follow-Up    As directed    ECG in office on day of appt.   Follow Up Details:  3/31 with Dr. Beal           Additional information on Labs and Follow-ups:      SEE DR. BLANCO April 21 AT 10:00 AM. OFFICE NUMBER [id number]. HIS OFFICE IS ON THE 2ND FLOOR OF THE MEDICAL  OFFICE BUILDING.                  Test Results Pending at Discharge       Flaco Beal MD  03/24/17  6:46 AM    Time: Discharge > 30 min  Was spent in discharge planning,preparation, dictation,and instructions at the time of discharge.      Lab Results   Component Value Date    WBC 5.65 03/23/2017    HGB 13.8 03/23/2017    HCT 41.5 03/23/2017    MCV 92.2 03/23/2017     03/23/2017

## 2017-03-28 DIAGNOSIS — Z98.61 S/P CORONARY ANGIOPLASTY: Primary | ICD-10-CM

## 2017-04-04 ENCOUNTER — TREATMENT (OUTPATIENT)
Dept: CARDIAC REHAB | Facility: HOSPITAL | Age: 77
End: 2017-04-04

## 2017-04-04 VITALS
WEIGHT: 162 LBS | RESPIRATION RATE: 12 BRPM | HEART RATE: 70 BPM | SYSTOLIC BLOOD PRESSURE: 110 MMHG | DIASTOLIC BLOOD PRESSURE: 60 MMHG | BODY MASS INDEX: 26.96 KG/M2

## 2017-04-04 DIAGNOSIS — Z98.61 S/P CORONARY ANGIOPLASTY: Primary | ICD-10-CM

## 2017-04-04 PROCEDURE — 93798 PHYS/QHP OP CAR RHAB W/ECG: CPT

## 2017-04-04 RX ORDER — GABAPENTIN 100 MG/1
100 CAPSULE ORAL
COMMUNITY
End: 2017-04-21

## 2017-04-04 NOTE — PROGRESS NOTES
Cardiac Rehab Initial Assessment      Name: Eloina Batista  :1940 Allergies:Clindamycin/lincomycin; Penicillins; and Sulfa antibiotics   MRN: [id number] 77 y.o. Physician: Flaco Beal MD   Primary Diagnosis:    Diagnosis Plan   1. S/P coronary angioplasty      Event Date: 3/22/2017 Specialist: Dr. Alex Blanco   Secondary Diagnosis: NA Risk Stratification:High Risk Note Author: Katharine Magdaleno RN     Cardiovascular History: Previous PCI     EXERCISE AT HOME  no  NO  N/A    EF: NA %      Source: NA          Ambulatory Status:Independent  Ambulatory Fall Risk Assessed on Initial Visit: yes 6 Minute Walk Pre- Cardiac Rehab:  Distance:864ft      RPE:12  Max. HR: 80       SPO2:96    MET: 2.3  MPH: na             RPD: na  Resting BP: 108/64 LA, 118/66 RA    Peak BP: 102/60  Recovery BP: 108/66  Comments: Tolerated well      NUTRITION  Lipids:yes If yes, labs as follows;  Total: No components found for: CHOLESTEROL  HDL:   HDL Cholesterol   Date Value Ref Range Status   2017 34 (L) 60 - 100 mg/dL Final    Lipids continued:  LDL:  LDL Cholesterol    Date Value Ref Range Status   2015 35 0 - 100 mg/dL Final     Comment:     DF by IF @ 2015 01:43  LDL Reference Range:      Optimal                       <100 mg/dL      Near Optimal               100-129 mg/dL      Borderline High            130-159 mg/dL      High                             160-189 mg/dL      Very High                     >189 mg/dL       Triglyceride: No components found for: TRIGLYCERIDE   Weight Management:                 Weight: 162.0 lb  Height: 65 in                                   BMI: Body mass index is 26.96 kg/(m^2).  Waist Circumference: na  inches   Alcohol Use: none Diabetes:No    Last HGBA1C with date if applicable:No components found for: A1C         SOCIAL HISTORY  Social History     Social History   • Marital status:      Spouse name: N/A   • Number of children: N/A   • Years of education: N/A      Social History Main Topics   • Smoking status: Never Smoker   • Smokeless tobacco: None   • Alcohol use No   • Drug use: No   • Sexual activity: Defer     Other Topics Concern   • None     Social History Narrative       Educational Level (choose one that applies) some college Learning Barriers:Ready to Learn    Family Support:yes    Living Arrangement: lives with their spouse    Risk Factors: Stress  Yes, Heredity  Yes If Yes mother and Hyperlipidemia  Yes     Tobacco Adjunct: No  na      Comorbidities: Previous MI     PSYCHOSOCIAL  Clinical Depression: no    Stress: yes     Assess presence or absence of depression using a valid screening tool: yes      PHYSICAL ASSESSMENT  Influenza vaccine: yes  Pneumococcal vaccine: yes          Angina: no    Describe angina scale of 0 - 4: 0 = none    Today are you having incisional pain? No. If, Yes, Scale: NA    na    Today are you having any other pain? No. If, Yes, Scale: na    na Diagnosed with Hypertension:yes    Heart Sounds: S1S2 no S3S4     Lung Sounds: normal air entry, lungs clear to auscultation         Assessment: Pt tolerated 6MWT well, NAD noted, no c/o's voiced, skin w/p/d, resp nl and even, denies chest discomfort, chest pressure ,SOA .  Monitor shows NSR, V/S WDL ,see [place] documentation for exercise data.  Dr. Blanco physician immediately available     Pt educated on warm up, stretching, use of RPE scale, application of heart monitor, home exercise and exercise guidelines. Diabetic guidelines for cardiac rehab.  Cleaning and use of equipment, s/s to report. TBV.   Orthopedic Problems: none    Are you being hurt, hit, or frightened by anyone at home or in your life? no    Are you being neglected by a caregiver? No Shoulder flexibility/Range of motion: Average     Recommended arm activity: Any    Chair sit and reach within: 0 inches   Leg flexibility: Average    Leg Strength/Balance/Five times sit to stand: 0 seconds.   NA  Chose one:  Average    Recommended stretching: Standing    Assessment: see above assessment, Patient states she has a titanium farhana across sternum for stabilization. States she has a sterile wrench in her purse she carries with her in case she has to has surgery on her chest. Stated Dr. Vuong placed it years ago in [facility] [place]: no Time of arrival: 810 am  Time of departure: 1045 am     Patient Goals: start an individual exercise program  And build her strength up.         4/4/2017  8:35 AM  Katharine Magdaleno RN

## 2017-04-04 NOTE — PATIENT INSTRUCTIONS
Pre-Exercise Meal  Eating the right foods and drinking fluids before exercising or physical activity can give you more energy and can help your muscles recover afterward. Your pre-exercise meal depends on your personal needs. Think about the length of time you will be exercising and any health conditions you have that may guide what you can and cannot eat. The number of calories in your pre-exercise meal should reflect the amount of time between the meal and your training session or performance. The earlier your meal, the more calories it should contain. If your pre-exercise meal does not have enough calories, you may become hungry, and your performance may decline.   WHEN SHOULD I EAT MY PRE-EXERCISE MEAL?  The best time to eat a pre-exercise meal will vary depending on your personal needs and the timing of the exercise. Again, the size of the meal should reflect the amount of time before your training session or performance begins. In general, you should eat your pre-exercise meal 3-4 hours before the exercise session. Within 30-60 minutes of the session, you may want to limit what you consume to fluids (such as water or sports drinks) or easily digestible carbohydrates (such as sports gels, fruit, or fruit juice).  WHAT SHOULD MY PRE-EXERCISE MEAL INCLUDE?  Your pre-exercise meal should include:  · Carbohydrates. Carbohydrates should be the primary type of food in your meal. Some examples of carbohydrate-rich meals include:      Pasta.    A bagel or toast with peanut butter and honey.    A turkey and Swiss cheese sandwich with fruit and skim milk.    A low-fat tuna melt sandwich with a fruit cup.    Oatmeal with fruit and almonds and skim milk.  · Fluids. Drink enough fluids to keep you well hydrated. Water, sports drinks, and juice are good choices.  · Lean protein. Include moderate amounts of lean protein foods. Examples of these foods include fish, poultry (such as chicken or turkey), and lean meat (such as  pork loin).    · Foods that you are familiar with and tolerate well.  WHAT SHOULD MY PRE-EXERCISE MEAL NOT INCLUDE?  Your pre-exercise meal should not include:  · Carbonated drinks, such as soda and sparkling water. These may cause stomach discomfort.  · Foods that are high in sugar. These foods may cause your blood sugar levels to quickly rise, followed by a rapid drop to very low levels. This rapid drop to low levels can leave you feeling tired, light-headed, anxious, or irritable. As a result, it can interfere with your exercise routine or athletic performance.  · Salty foods, such as chips, crackers, or soups. These foods can increase your thirst during exercise.  · Foods that are high in fiber, such as bran products, dried beans or legumes, and vegetables such as onions, cauliflower, or cabbage. These types of foods can cause bloating or gas.  · Foods that are high in fat, including fried foods. Because you cannot digest fat as quickly, it may cause bloating and stomach discomfort.  · Caffeinated drinks, if caffeine affects you negatively. For some people, caffeine can cause nausea and anxiousness. Drinking a large amount of caffeine can lead to dehydration, because it causes your body to lose water.     This information is not intended to replace advice given to you by your health care provider. Make sure you discuss any questions you have with your health care provider.     Document Released: 12/18/2006 Document Revised: 01/08/2016 Document Reviewed: 05/19/2015  [employer] Interactive Patient Education ©2016 [employer]  Exercise Guidelines During Cardiac Rehabilitation  During cardiac rehabilitation, it is important to do more aerobic activities. Even simple lifestyle changes can help, such as parking farther from the store or taking the stairs instead of the elevator.   Discuss an appropriate exercise program with your cardiologist and rehabilitation therapist. It is important to design a program that is  safe and effective for you. The program should meet your specific abilities and needs. Walking, biking, jogging, and swimming are all good aerobic activities. These take light to moderate effort. Adding some light resistance training is also important.   STRETCHING  Stretching before you exercise warms up your muscles and prevents injury. Stretching also improves your flexibility, balance, coordination, and range of motion.  · Stretch both before and after exercising.  · Do not force a muscle or joint into a painful angle. Stretching should be a relaxing part of your exercise routine.  · As soon as you feel resistance, hold the position and count to 10.  · Go slowly when doing all stretches.  TYPES OF EXERCISE  Aerobic Exercise  Aerobic exercise keeps joints and muscles moving. It involves large muscle groups. It is also rhythmic in nature and done for a longer period. Doing these exercises improves circulation and endurance. Examples of aerobic exercise include:  · Swimming.  · Walking.  · Hiking.  · Jogging.  · Cross-country skiing.  · Biking.  Static Exercise  Static exercise uses muscles at high intensities without moving the joints. Pushing against a heavy couch that does not move is an example of static exercise. Static exercise improves strength but also quickly increases blood pressure. Therefore, you need to follow these guidelines:  · Do not hold your breath while doing static exercises. Holding your breath during static exercises can raise your blood pressure to a dangerously high level.  · Do not do static exercises if you have circulation problems or high blood pressure.  Weight-Resistance Exercise  Weight-resistance exercises are another important part of rehabilitation. These exercises strengthen your muscles by making them work against resistance. Examples include using free weights, weight-lifting machines, and large, specially designed rubber bands. Resistance exercises may help you return to  activities of daily living sooner and improve your quality of life. They also help reduce cardiac risk factors. You will usually do weight-resistance exercises twice a week with a 2-day rest period between workouts.  SETTING A PACE   · Choose a pace that is comfortable for you. You should be able to talk with a friend while exercising. Slow down if you are short of breath or unable to speak while you exercise.  · Keep track of how hard you are working as you exercise. Your rehabilitation therapist can teach you to use a scale to measure your level of exertion. Use this scale to make sure you are exercising at a level that is safe and effective for you. Cardiac rehabilitation and wellness facilities use numerical scales to rate your level of exertion. These scales usually rate your exertion level in a range from 6 to 20. A rating of 6 means that you are not exerting yourself at all. A rating of 20 indicates that you are working very hard. For a healthy exercise session, your exertion rate goal should be between 11 and 15.     This information is not intended to replace advice given to you by your health care provider. Make sure you discuss any questions you have with your health care provider.     Document Released: 12/23/2014 Document Revised: 01/08/2016 Document Reviewed: 12/23/2014  [employer] Interactive Patient Education ©2016 [employer]    Stress and Stress Management  Stress is a normal reaction to life events. It is what you feel when life demands more than you are used to or more than you can handle. Some stress can be useful. For example, the stress reaction can help you catch the last bus of the day, study for a test, or meet a deadline at work. But stress that occurs too often or for too long can cause problems. It can affect your emotional health and interfere with relationships and normal daily activities. Too much stress can weaken your immune system and increase your risk for physical illness. If you  already have a medical problem, stress can make it worse.  CAUSES   All sorts of life events may cause stress. An event that causes stress for one person may not be stressful for another person. Major life events commonly cause stress. These may be positive or negative. Examples include losing your job, moving into a new home, getting , having a baby, or losing a loved one. Less obvious life events may also cause stress, especially if they occur day after day or in combination. Examples include working long hours, driving in traffic, caring for children, being in debt, or being in a difficult relationship.  SIGNS AND SYMPTOMS  Stress may cause emotional symptoms including, the following:  · Anxiety. This is feeling worried, afraid, on edge, overwhelmed, or out of control.  · Anger. This is feeling irritated or impatient.  · Depression. This is feeling sad, down, helpless, or guilty.  · Difficulty focusing, remembering, or making decisions.  Stress may cause physical symptoms, including the following:   · Aches and pains. These may affect your head, neck, back, stomach, or other areas of your body.  · Tight muscles or clenched jaw.  · Low energy or trouble sleeping.   Stress may cause unhealthy behaviors, including the following:   · Eating to feel better (overeating) or skipping meals.  · Sleeping too little, too much, or both.  · Working too much or putting off tasks (procrastination).  · Smoking, drinking alcohol, or using drugs to feel better.  DIAGNOSIS   Stress is diagnosed through an assessment by your health care provider. Your health care provider will ask questions about your symptoms and any stressful life events. Your health care provider will also ask about your medical history and may order blood tests or other tests. Certain medical conditions and medicine can cause physical symptoms similar to stress.  Mental illness can cause emotional symptoms and unhealthy behaviors similar to stress. Your  "health care provider may refer you to a mental health professional for further evaluation.   TREATMENT   Stress management is the recommended treatment for stress. The goals of stress management are reducing stressful life events and coping with stress in healthy ways.   Techniques for reducing stressful life events include the following:  · Stress identification. Self-monitor for stress and identify what causes stress for you. These skills may help you to avoid some stressful events.  · Time management. Set your priorities, keep a calendar of events, and learn to say \"no.\" These tools can help you avoid making too many commitments.  Techniques for coping with stress include the following:  · Rethinking the problem. Try to think realistically about stressful events rather than ignoring them or overreacting. Try to find the positives in a stressful situation rather than focusing on the negatives.  · Exercise. Physical exercise can release both physical and emotional tension. The key is to find a form of exercise you enjoy and do it regularly.  · Relaxation techniques. These relax the body and mind. Examples include yoga, meditation, sandra chi, biofeedback, deep breathing, progressive muscle relaxation, listening to music, being out in nature, journaling, and other hobbies. Again, the key is to find one or more that you enjoy and can do regularly.  · Healthy lifestyle. Eat a balanced diet, get plenty of sleep, and do not smoke. Avoid using alcohol or drugs to relax.  · Strong support network. Spend time with family, friends, or other people you enjoy being around. Express your feelings and talk things over with someone you trust.  Counseling or talk therapy with a mental health professional may be helpful if you are having difficulty managing stress on your own. Medicine is typically not recommended for the treatment of stress. Talk to your health care provider if you think you need medicine for symptoms of " stress.  HOME CARE INSTRUCTIONS  · Keep all follow-up visits as directed by your health care provider.  · Take all medicines as directed by your health care provider.  SEEK MEDICAL CARE IF:  · Your symptoms get worse or you start having new symptoms.  · You feel overwhelmed by your problems and can no longer manage them on your own.  SEEK IMMEDIATE MEDICAL CARE IF:  · You feel like hurting yourself or someone else.     This information is not intended to replace advice given to you by your health care provider. Make sure you discuss any questions you have with your health care provider.     Document Released: 06/13/2002 Document Revised: 01/13/2017 Document Reviewed: 08/12/2014  [employer] Interactive Patient Education ©2016 [employer]  Generalized Anxiety Disorder  Generalized anxiety disorder (AUTUMN) is a mental disorder. It interferes with life functions, including relationships, work, and school.  AUTUMN is different from normal anxiety, which everyone experiences at some point in their lives in response to specific life events and activities. Normal anxiety actually helps us prepare for and get through these life events and activities. Normal anxiety goes away after the event or activity is over.   AUTUMN causes anxiety that is not necessarily related to specific events or activities. It also causes excess anxiety in proportion to specific events or activities. The anxiety associated with AUTUMN is also difficult to control. AUTUMN can vary from mild to severe. People with severe AUTUMN can have intense waves of anxiety with physical symptoms (panic attacks).   SYMPTOMS  The anxiety and worry associated with AUTUMN are difficult to control. This anxiety and worry are related to many life events and activities and also occur more days than not for 6 months or longer. People with AUTUMN also have three or more of the following symptoms (one or more in children):  · Restlessness.    · Fatigue.  · Difficulty concentrating.     · Irritability.  · Muscle tension.  · Difficulty sleeping or unsatisfying sleep.  DIAGNOSIS  AUTUMN is diagnosed through an assessment by your health care provider. Your health care provider will ask you questions about your mood, physical symptoms, and events in your life. Your health care provider may ask you about your medical history and use of alcohol or drugs, including prescription medicines. Your health care provider may also do a physical exam and blood tests. Certain medical conditions and the use of certain substances can cause symptoms similar to those associated with AUTUMN. Your health care provider may refer you to a mental health specialist for further evaluation.  TREATMENT  The following therapies are usually used to treat AUTUMN:   · Medication. Antidepressant medication usually is prescribed for long-term daily control. Antianxiety medicines may be added in severe cases, especially when panic attacks occur.    · Talk therapy (psychotherapy). Certain types of talk therapy can be helpful in treating AUTUMN by providing support, education, and guidance. A form of talk therapy called cognitive behavioral therapy can teach you healthy ways to think about and react to daily life events and activities.  · Stress management techniques. These include yoga, meditation, and exercise and can be very helpful when they are practiced regularly.  A mental health specialist can help determine which treatment is best for you. Some people see improvement with one therapy. However, other people require a combination of therapies.     This information is not intended to replace advice given to you by your health care provider. Make sure you discuss any questions you have with your health care provider.     Document Released: 04/14/2014 Document Revised: 01/08/2016 Document Reviewed: 04/14/2014  [employer] Interactive Patient Education ©2016 [employer]    Major Depressive Disorder  Major depressive disorder is a mood  disorder. It is not the same as feeling sad when bad things happen. This disorder can last for days or weeks. This disorder can make it hard to work or do things with family or friends. It can make a person feel:  · Sad.  · Empty.  · Hopeless.  · Helpless.  · Irritable.  In addition to these feelings, people who have this disorder have at least 4 of these symptoms:  · Having trouble sleeping.  · Sleeping too much.  · A big (major) change in appetite.  · A big change in weight.  · A lack of energy.  · Feelings of guilt or worthlessness.  · Having a hard time with concentrating, remembering, or making decisions.  · Slow movements.  · Restlessness.  · Thinking or dreaming about suicide or about harming oneself.  · Suicide attempt.  HOME CARE  · Take over-the-counter and prescription medicines only as told by your doctor.  · Keep all follow-up visits as told by your doctor. This includes any therapy that your doctor recommends for you.  · Lessen your stress. Do something that you enjoy, such as biking, walking, or reading a book.  · Exercise often.  · Eat a healthy diet.  · Do not drink alcohol.  · Do not take drugs.  · Get support from your family and friends.  GET HELP RIGHT AWAY IF:   · You start to hear voices.  · You see things that are not really there.  · You feel like people are out to get you (paranoid).  · You have serious thoughts about hurting yourself or others.  · You think about suicide.     This information is not intended to replace advice given to you by your health care provider. Make sure you discuss any questions you have with your health care provider.     Document Released: 11/28/2016 Document Reviewed: 11/28/2016  [employer] Interactive Patient Education ©2016 [employer]

## 2017-04-06 ENCOUNTER — APPOINTMENT (OUTPATIENT)
Dept: CARDIAC REHAB | Facility: HOSPITAL | Age: 77
End: 2017-04-06

## 2017-04-10 ENCOUNTER — TREATMENT (OUTPATIENT)
Dept: CARDIAC REHAB | Facility: HOSPITAL | Age: 77
End: 2017-04-10

## 2017-04-10 VITALS — HEART RATE: 62 BPM | SYSTOLIC BLOOD PRESSURE: 140 MMHG | DIASTOLIC BLOOD PRESSURE: 76 MMHG

## 2017-04-10 DIAGNOSIS — Z98.61 S/P CORONARY ANGIOPLASTY: Primary | ICD-10-CM

## 2017-04-10 PROCEDURE — 93798 PHYS/QHP OP CAR RHAB W/ECG: CPT

## 2017-04-10 NOTE — PROGRESS NOTES
Pt attended phase II visit.  Tolerated exercise well, NAD noted, no complaints voiced, skin warm, pink, dry, resp nl and even, denies chest discomfort,NAD noted, no complaints voiced, skin warm, pink and dry, resp within normal limits and even, denies chest discomfort, chest pressure ,SOA .  Monitor shows NSR , V/S WDL ,see [place] documentation for exercise data.  Dr. Jimenez physician immediately available     Patient attended first day of exercise. Educated on warm up, cool down, cleaning and use of equipment, signs and symptoms to report, Cardiac Rehab Protocol, Applying the heart monitor.     Dietary Consult set up for 4/26/2017 at 1015 with Sheridan Wilson

## 2017-04-11 ENCOUNTER — TREATMENT (OUTPATIENT)
Dept: CARDIAC REHAB | Facility: HOSPITAL | Age: 77
End: 2017-04-11

## 2017-04-11 VITALS — HEART RATE: 64 BPM | DIASTOLIC BLOOD PRESSURE: 68 MMHG | SYSTOLIC BLOOD PRESSURE: 128 MMHG

## 2017-04-11 DIAGNOSIS — Z98.61 S/P CORONARY ANGIOPLASTY: Primary | ICD-10-CM

## 2017-04-11 PROCEDURE — 93798 PHYS/QHP OP CAR RHAB W/ECG: CPT

## 2017-04-11 NOTE — PROGRESS NOTES
Pt attended phase II visit.  Tolerated exercise well, NAD noted, no complaints voiced, skin warm, pink, dry, resp nl and even, denies chest discomfort,NAD noted, no complaints voiced, skin warm, pink and dry, resp within normal limits and even, denies chest discomfort, chest pressure ,SOA .  Monitor shows NSR , V/S WDL ,see [facility] documentation for exercise data.  Dr. Jimenez physician immediately available     Patient brought her home blood pressure monitor and we compared with manual blood pressure. The home machine read 127/68 manual b/p was 128/66

## 2017-04-12 ENCOUNTER — APPOINTMENT (OUTPATIENT)
Dept: CARDIAC REHAB | Facility: HOSPITAL | Age: 77
End: 2017-04-12

## 2017-04-13 ENCOUNTER — TREATMENT (OUTPATIENT)
Dept: CARDIAC REHAB | Facility: HOSPITAL | Age: 77
End: 2017-04-13

## 2017-04-13 VITALS
RESPIRATION RATE: 20 BRPM | DIASTOLIC BLOOD PRESSURE: 74 MMHG | OXYGEN SATURATION: 98 % | HEART RATE: 66 BPM | SYSTOLIC BLOOD PRESSURE: 140 MMHG

## 2017-04-13 DIAGNOSIS — Z98.61 S/P CORONARY ANGIOPLASTY: Primary | ICD-10-CM

## 2017-04-13 PROCEDURE — 93798 PHYS/QHP OP CAR RHAB W/ECG: CPT

## 2017-04-13 NOTE — PROGRESS NOTES
Phase II patient, see  [place] documentation for exercise data documentation.  Dr. Jimenez physician immediately available. NAD note, skin w/p/d, denies CP, SOA, tolerated exercise well.

## 2017-04-14 ENCOUNTER — APPOINTMENT (OUTPATIENT)
Dept: CARDIAC REHAB | Facility: HOSPITAL | Age: 77
End: 2017-04-14

## 2017-04-17 ENCOUNTER — TREATMENT (OUTPATIENT)
Dept: CARDIAC REHAB | Facility: HOSPITAL | Age: 77
End: 2017-04-17

## 2017-04-17 VITALS — HEART RATE: 66 BPM | DIASTOLIC BLOOD PRESSURE: 80 MMHG | SYSTOLIC BLOOD PRESSURE: 120 MMHG

## 2017-04-17 DIAGNOSIS — Z98.61 S/P CORONARY ANGIOPLASTY: Primary | ICD-10-CM

## 2017-04-17 PROCEDURE — 93798 PHYS/QHP OP CAR RHAB W/ECG: CPT

## 2017-04-17 NOTE — PROGRESS NOTES
Phase II patient, see  [place] documentation for exercise data documentation.  Dr. Blanco physician immediately available. NAD note, skin w/p/d, denies CP, SOA, tolerated exercise well.

## 2017-04-19 ENCOUNTER — TREATMENT (OUTPATIENT)
Dept: CARDIAC REHAB | Facility: HOSPITAL | Age: 77
End: 2017-04-19

## 2017-04-19 VITALS — DIASTOLIC BLOOD PRESSURE: 74 MMHG | SYSTOLIC BLOOD PRESSURE: 138 MMHG | HEART RATE: 84 BPM | OXYGEN SATURATION: 100 %

## 2017-04-19 DIAGNOSIS — Z98.61 S/P CORONARY ANGIOPLASTY: Primary | ICD-10-CM

## 2017-04-19 PROCEDURE — 93798 PHYS/QHP OP CAR RHAB W/ECG: CPT

## 2017-04-19 NOTE — PROGRESS NOTES
Phase II patient, see  [place] documentation for exercise data documentation.  Dr. Blanco physician immediately available. NAD note, skin w/p/d, denies CP, SOA, tolerated exercise well.

## 2017-04-21 ENCOUNTER — TREATMENT (OUTPATIENT)
Dept: CARDIAC REHAB | Facility: HOSPITAL | Age: 77
End: 2017-04-21

## 2017-04-21 ENCOUNTER — OFFICE VISIT (OUTPATIENT)
Dept: CARDIOLOGY | Facility: CLINIC | Age: 77
End: 2017-04-21

## 2017-04-21 VITALS
DIASTOLIC BLOOD PRESSURE: 67 MMHG | HEART RATE: 72 BPM | SYSTOLIC BLOOD PRESSURE: 114 MMHG | OXYGEN SATURATION: 97 % | HEIGHT: 65 IN | BODY MASS INDEX: 28.16 KG/M2 | WEIGHT: 169 LBS

## 2017-04-21 VITALS — DIASTOLIC BLOOD PRESSURE: 84 MMHG | HEART RATE: 68 BPM | SYSTOLIC BLOOD PRESSURE: 146 MMHG

## 2017-04-21 DIAGNOSIS — I10 ESSENTIAL HYPERTENSION: ICD-10-CM

## 2017-04-21 DIAGNOSIS — I25.10 ASHD (ARTERIOSCLEROTIC HEART DISEASE): Primary | ICD-10-CM

## 2017-04-21 DIAGNOSIS — Z98.61 S/P CORONARY ANGIOPLASTY: Primary | ICD-10-CM

## 2017-04-21 DIAGNOSIS — E78.2 MIXED HYPERLIPIDEMIA: ICD-10-CM

## 2017-04-21 PROCEDURE — 93798 PHYS/QHP OP CAR RHAB W/ECG: CPT

## 2017-04-21 PROCEDURE — 99213 OFFICE O/P EST LOW 20 MIN: CPT | Performed by: INTERNAL MEDICINE

## 2017-04-21 RX ORDER — VALSARTAN 320 MG/1
320 TABLET ORAL DAILY
COMMUNITY
End: 2022-01-01

## 2017-04-21 RX ORDER — ROSUVASTATIN CALCIUM 10 MG/1
10 TABLET, COATED ORAL DAILY
COMMUNITY
End: 2022-01-01 | Stop reason: HOSPADM

## 2017-04-21 RX ORDER — RANOLAZINE 1000 MG/1
500 TABLET, EXTENDED RELEASE ORAL 2 TIMES DAILY
COMMUNITY
End: 2017-08-14

## 2017-04-21 RX ORDER — GABAPENTIN 100 MG/1
100 CAPSULE ORAL 3 TIMES DAILY
COMMUNITY
End: 2022-01-01 | Stop reason: HOSPADM

## 2017-04-21 NOTE — PROGRESS NOTES
Phase II patient, see  [place] documentation for exercise data documentation.  Dr. Blanco physician immediately available. NAD note, skin w/p/d, denies CP, SOA, tolerated exercise well.     Progress report sent to Dr. Blanco

## 2017-04-21 NOTE — PROGRESS NOTES
Subjective   Eloina Batista is a 77 y.o. female.     No chief complaint on file.      History of Present Illness     Eloina is a very pleasant 77-year-old woman who presents for follow-up of known coronary artery disease.  She initially underwent bypass surgery and multiple stent implantations since that time.  Her last stent was implanted in 2006.  She presented in March 2017 with relatively typical unstable angina.  Cardiac catheterization at that time demonstrated a right dominant coronary artery system with native single vessel disease involving the LAD and diagonal branch.  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  She underwent cutting balloon angioplasty to this vessel which was uncomplicated.  She currently states that she is doing much better.  She is participating in cardiac rehabilitation and notes that with activity she is no longer having any angina at all.  She denies any congestive heart failure symptoms.  She denies any palpitations, near syncope or syncopal symptoms.  She has been compliant with medical therapy.    The following portions of the patient's history were reviewed and updated as appropriate: allergies, current medications, past family history, past medical history, past social history, past surgical history and problem list.    Review of Systems   Constitutional: Negative for activity change, appetite change and fatigue.   HENT: Negative for congestion and tinnitus.    Eyes: Negative for visual disturbance.   Respiratory: Negative for cough, chest tightness and shortness of breath.    Cardiovascular: Negative for chest pain, palpitations and leg swelling.   Gastrointestinal: Negative for blood in stool, nausea and vomiting.   Endocrine: Negative for cold intolerance, heat intolerance and polyuria.   Genitourinary: Negative for dysuria.   Musculoskeletal: Negative for myalgias and neck  pain.   Skin: Negative for rash.   Neurological: Negative for dizziness, syncope, weakness and light-headedness.   Hematological: Bruises/bleeds easily.   Psychiatric/Behavioral: Negative for confusion and sleep disturbance.       Objective   Physical Exam   Constitutional: She is oriented to person, place, and time. She appears well-developed and well-nourished. No distress.   HENT:   Head: Normocephalic and atraumatic.   Nose: Nose normal.   Mouth/Throat: Oropharynx is clear and moist.   Eyes: Conjunctivae and EOM are normal. Right eye exhibits no discharge. Left eye exhibits no discharge. No scleral icterus.   Neck: Normal range of motion. No hepatojugular reflux and no JVD present. Carotid bruit is not present. No tracheal deviation present.   Cardiovascular: Normal rate, regular rhythm, S1 normal, S2 normal and intact distal pulses.  PMI is not displaced.  Exam reveals no gallop, no S3, no S4 and no friction rub.    No murmur heard.  Pulmonary/Chest: Effort normal and breath sounds normal. No accessory muscle usage. No respiratory distress. She has no wheezes. She has no rales. She exhibits no tenderness.   Abdominal: Soft. Bowel sounds are normal. She exhibits no distension. There is no tenderness.   Musculoskeletal: Normal range of motion. She exhibits no edema or tenderness.       Vascular Status -  Her exam exhibits no right foot edema. Her exam exhibits no left foot edema.  Neurological: She is alert and oriented to person, place, and time. No cranial nerve deficit. Coordination normal.   Skin: Skin is warm and dry. She is not diaphoretic.   Nursing note and vitals reviewed.      Procedures    Assessment/Plan   Coronary Artery Disease.  I suspect that overall the patient's coronary artery disease is stable.  However, with a cutting balloon angioplasty alone she does have an approximate 20% chance of restenosis.  As such, I will monitor her closely over the next 6 months.  At this time I have not ordered  any further cardiac testing.  I will work on risk factor modification as noted below.    Hypertension.  The home blood pressure diary suggests reasonable control of the patient's HTN.  At this time I will continue current medications as is.  I have asked them to maintain a blood pressure diary    Hyperlipidemia In regard to their history of hyperlipidemia, the most recent cholesterol panel demonstrated excellent control.  I will continue the current medications as is.    In short, it is been a pleasure to participate in Eloina's care, I will see her again in 3 months.    Addendum/Record Review  CABG was in 2002.  Had multiple PCIs.  Cath in 2009 and 2014 OK

## 2017-04-24 ENCOUNTER — TREATMENT (OUTPATIENT)
Dept: CARDIAC REHAB | Facility: HOSPITAL | Age: 77
End: 2017-04-24

## 2017-04-24 VITALS — DIASTOLIC BLOOD PRESSURE: 70 MMHG | SYSTOLIC BLOOD PRESSURE: 102 MMHG | HEART RATE: 69 BPM

## 2017-04-24 DIAGNOSIS — Z98.61 S/P CORONARY ANGIOPLASTY: Primary | ICD-10-CM

## 2017-04-24 PROCEDURE — 93798 PHYS/QHP OP CAR RHAB W/ECG: CPT

## 2017-04-24 NOTE — PROGRESS NOTES
Phase II patient, see  [place] documentation for exercise data documentation.  Dr. Peters physician immediately available. NAD note, skin w/p/d, denies CP, SOA, tolerated exercise well.

## 2017-04-26 ENCOUNTER — TREATMENT (OUTPATIENT)
Dept: CARDIAC REHAB | Facility: HOSPITAL | Age: 77
End: 2017-04-26

## 2017-04-26 VITALS — SYSTOLIC BLOOD PRESSURE: 124 MMHG | DIASTOLIC BLOOD PRESSURE: 60 MMHG | HEART RATE: 65 BPM

## 2017-04-26 DIAGNOSIS — Z98.61 S/P CORONARY ANGIOPLASTY: Primary | ICD-10-CM

## 2017-04-26 PROCEDURE — 93798 PHYS/QHP OP CAR RHAB W/ECG: CPT

## 2017-04-28 ENCOUNTER — TREATMENT (OUTPATIENT)
Dept: CARDIAC REHAB | Facility: HOSPITAL | Age: 77
End: 2017-04-28

## 2017-04-28 VITALS — SYSTOLIC BLOOD PRESSURE: 116 MMHG | DIASTOLIC BLOOD PRESSURE: 74 MMHG | HEART RATE: 71 BPM

## 2017-04-28 DIAGNOSIS — Z98.61 S/P CORONARY ANGIOPLASTY: Primary | ICD-10-CM

## 2017-04-28 PROCEDURE — 93798 PHYS/QHP OP CAR RHAB W/ECG: CPT

## 2017-05-01 ENCOUNTER — TREATMENT (OUTPATIENT)
Dept: CARDIAC REHAB | Facility: HOSPITAL | Age: 77
End: 2017-05-01

## 2017-05-01 VITALS — DIASTOLIC BLOOD PRESSURE: 76 MMHG | SYSTOLIC BLOOD PRESSURE: 104 MMHG | HEART RATE: 75 BPM

## 2017-05-01 DIAGNOSIS — Z98.61 S/P CORONARY ANGIOPLASTY: Primary | ICD-10-CM

## 2017-05-01 PROCEDURE — 93798 PHYS/QHP OP CAR RHAB W/ECG: CPT

## 2017-05-03 ENCOUNTER — TREATMENT (OUTPATIENT)
Dept: CARDIAC REHAB | Facility: HOSPITAL | Age: 77
End: 2017-05-03

## 2017-05-03 VITALS — DIASTOLIC BLOOD PRESSURE: 64 MMHG | HEART RATE: 59 BPM | SYSTOLIC BLOOD PRESSURE: 144 MMHG

## 2017-05-03 DIAGNOSIS — Z98.61 S/P CORONARY ANGIOPLASTY: Primary | ICD-10-CM

## 2017-05-03 PROCEDURE — 93798 PHYS/QHP OP CAR RHAB W/ECG: CPT

## 2017-05-05 ENCOUNTER — TREATMENT (OUTPATIENT)
Dept: CARDIAC REHAB | Facility: HOSPITAL | Age: 77
End: 2017-05-05

## 2017-05-05 VITALS — SYSTOLIC BLOOD PRESSURE: 112 MMHG | OXYGEN SATURATION: 100 % | DIASTOLIC BLOOD PRESSURE: 60 MMHG | HEART RATE: 58 BPM

## 2017-05-05 DIAGNOSIS — Z98.61 S/P CORONARY ANGIOPLASTY: Primary | ICD-10-CM

## 2017-05-05 PROCEDURE — 93798 PHYS/QHP OP CAR RHAB W/ECG: CPT

## 2017-05-08 ENCOUNTER — TREATMENT (OUTPATIENT)
Dept: CARDIAC REHAB | Facility: HOSPITAL | Age: 77
End: 2017-05-08

## 2017-05-08 VITALS — HEART RATE: 61 BPM | SYSTOLIC BLOOD PRESSURE: 118 MMHG | DIASTOLIC BLOOD PRESSURE: 72 MMHG | OXYGEN SATURATION: 100 %

## 2017-05-08 DIAGNOSIS — Z98.61 S/P CORONARY ANGIOPLASTY: Primary | ICD-10-CM

## 2017-05-08 PROCEDURE — 93798 PHYS/QHP OP CAR RHAB W/ECG: CPT

## 2017-05-10 ENCOUNTER — APPOINTMENT (OUTPATIENT)
Dept: CARDIAC REHAB | Facility: HOSPITAL | Age: 77
End: 2017-05-10

## 2017-05-12 ENCOUNTER — APPOINTMENT (OUTPATIENT)
Dept: CARDIAC REHAB | Facility: HOSPITAL | Age: 77
End: 2017-05-12

## 2017-05-15 ENCOUNTER — APPOINTMENT (OUTPATIENT)
Dept: CARDIAC REHAB | Facility: HOSPITAL | Age: 77
End: 2017-05-15

## 2017-05-17 ENCOUNTER — TREATMENT (OUTPATIENT)
Dept: CARDIAC REHAB | Facility: HOSPITAL | Age: 77
End: 2017-05-17

## 2017-05-17 VITALS — DIASTOLIC BLOOD PRESSURE: 76 MMHG | HEART RATE: 63 BPM | SYSTOLIC BLOOD PRESSURE: 118 MMHG

## 2017-05-17 DIAGNOSIS — Z98.61 S/P CORONARY ANGIOPLASTY: Primary | ICD-10-CM

## 2017-05-17 PROCEDURE — 93798 PHYS/QHP OP CAR RHAB W/ECG: CPT

## 2017-05-19 ENCOUNTER — TREATMENT (OUTPATIENT)
Dept: CARDIAC REHAB | Facility: HOSPITAL | Age: 77
End: 2017-05-19

## 2017-05-19 VITALS — SYSTOLIC BLOOD PRESSURE: 120 MMHG | HEART RATE: 63 BPM | DIASTOLIC BLOOD PRESSURE: 74 MMHG

## 2017-05-19 DIAGNOSIS — Z98.61 S/P CORONARY ANGIOPLASTY: Primary | ICD-10-CM

## 2017-05-19 PROCEDURE — 93798 PHYS/QHP OP CAR RHAB W/ECG: CPT

## 2017-05-22 ENCOUNTER — APPOINTMENT (OUTPATIENT)
Dept: CARDIAC REHAB | Facility: HOSPITAL | Age: 77
End: 2017-05-22

## 2017-05-23 ENCOUNTER — TREATMENT (OUTPATIENT)
Dept: CARDIAC REHAB | Facility: HOSPITAL | Age: 77
End: 2017-05-23

## 2017-05-23 VITALS — SYSTOLIC BLOOD PRESSURE: 120 MMHG | DIASTOLIC BLOOD PRESSURE: 80 MMHG | HEART RATE: 67 BPM | OXYGEN SATURATION: 98 %

## 2017-05-23 DIAGNOSIS — Z98.61 S/P CORONARY ANGIOPLASTY: Primary | ICD-10-CM

## 2017-05-23 PROCEDURE — 93798 PHYS/QHP OP CAR RHAB W/ECG: CPT

## 2017-05-24 ENCOUNTER — APPOINTMENT (OUTPATIENT)
Dept: CARDIAC REHAB | Facility: HOSPITAL | Age: 77
End: 2017-05-24

## 2017-05-25 ENCOUNTER — TREATMENT (OUTPATIENT)
Dept: CARDIAC REHAB | Facility: HOSPITAL | Age: 77
End: 2017-05-25

## 2017-05-25 VITALS — SYSTOLIC BLOOD PRESSURE: 130 MMHG | DIASTOLIC BLOOD PRESSURE: 72 MMHG | OXYGEN SATURATION: 100 % | HEART RATE: 68 BPM

## 2017-05-25 DIAGNOSIS — Z98.61 S/P CORONARY ANGIOPLASTY: Primary | ICD-10-CM

## 2017-05-25 PROCEDURE — 93798 PHYS/QHP OP CAR RHAB W/ECG: CPT

## 2017-05-26 ENCOUNTER — TREATMENT (OUTPATIENT)
Dept: CARDIAC REHAB | Facility: HOSPITAL | Age: 77
End: 2017-05-26

## 2017-05-26 VITALS — HEART RATE: 61 BPM | SYSTOLIC BLOOD PRESSURE: 130 MMHG | DIASTOLIC BLOOD PRESSURE: 70 MMHG | OXYGEN SATURATION: 98 %

## 2017-05-26 DIAGNOSIS — Z98.61 S/P CORONARY ANGIOPLASTY: Primary | ICD-10-CM

## 2017-05-26 PROCEDURE — 93798 PHYS/QHP OP CAR RHAB W/ECG: CPT

## 2017-05-30 ENCOUNTER — TREATMENT (OUTPATIENT)
Dept: CARDIAC REHAB | Facility: HOSPITAL | Age: 77
End: 2017-05-30

## 2017-05-30 VITALS — DIASTOLIC BLOOD PRESSURE: 60 MMHG | SYSTOLIC BLOOD PRESSURE: 130 MMHG | HEART RATE: 62 BPM | OXYGEN SATURATION: 100 %

## 2017-05-30 DIAGNOSIS — Z98.61 S/P CORONARY ANGIOPLASTY: Primary | ICD-10-CM

## 2017-05-30 PROCEDURE — 93798 PHYS/QHP OP CAR RHAB W/ECG: CPT

## 2017-05-31 ENCOUNTER — APPOINTMENT (OUTPATIENT)
Dept: CARDIAC REHAB | Facility: HOSPITAL | Age: 77
End: 2017-05-31

## 2017-06-01 ENCOUNTER — APPOINTMENT (OUTPATIENT)
Dept: CARDIAC REHAB | Facility: HOSPITAL | Age: 77
End: 2017-06-01

## 2017-06-02 ENCOUNTER — TREATMENT (OUTPATIENT)
Dept: CARDIAC REHAB | Facility: HOSPITAL | Age: 77
End: 2017-06-02

## 2017-06-02 VITALS — SYSTOLIC BLOOD PRESSURE: 110 MMHG | DIASTOLIC BLOOD PRESSURE: 70 MMHG | HEART RATE: 61 BPM

## 2017-06-02 DIAGNOSIS — Z98.61 S/P CORONARY ANGIOPLASTY: Primary | ICD-10-CM

## 2017-06-02 PROCEDURE — 93798 PHYS/QHP OP CAR RHAB W/ECG: CPT

## 2017-06-02 NOTE — PROGRESS NOTES
Phase II patient, see  [place] documentation for exercise data documentation.  Dr. Blanco physician immediately available. NAD note, skin w/p/d, denies CP, SOA, tolerated exercise well.

## 2017-06-05 ENCOUNTER — APPOINTMENT (OUTPATIENT)
Dept: CARDIAC REHAB | Facility: HOSPITAL | Age: 77
End: 2017-06-05

## 2017-06-06 ENCOUNTER — APPOINTMENT (OUTPATIENT)
Dept: CARDIAC REHAB | Facility: HOSPITAL | Age: 77
End: 2017-06-06

## 2017-06-07 ENCOUNTER — TREATMENT (OUTPATIENT)
Dept: CARDIAC REHAB | Facility: HOSPITAL | Age: 77
End: 2017-06-07

## 2017-06-07 VITALS — DIASTOLIC BLOOD PRESSURE: 74 MMHG | HEART RATE: 66 BPM | SYSTOLIC BLOOD PRESSURE: 132 MMHG

## 2017-06-07 DIAGNOSIS — Z98.61 S/P CORONARY ANGIOPLASTY: Primary | ICD-10-CM

## 2017-06-07 PROCEDURE — 93798 PHYS/QHP OP CAR RHAB W/ECG: CPT

## 2017-06-07 NOTE — PROGRESS NOTES
Phase II patient, see  [place] documentation for exercise data documentation.  Dr. Jimenez physician immediately available. NAD note, skin w/p/d, denies CP, SOA, tolerated exercise well.

## 2017-06-09 ENCOUNTER — APPOINTMENT (OUTPATIENT)
Dept: CARDIAC REHAB | Facility: HOSPITAL | Age: 77
End: 2017-06-09

## 2017-06-12 ENCOUNTER — TREATMENT (OUTPATIENT)
Dept: CARDIAC REHAB | Facility: HOSPITAL | Age: 77
End: 2017-06-12

## 2017-06-12 VITALS — HEART RATE: 59 BPM | SYSTOLIC BLOOD PRESSURE: 112 MMHG | DIASTOLIC BLOOD PRESSURE: 70 MMHG

## 2017-06-12 DIAGNOSIS — Z98.61 S/P CORONARY ANGIOPLASTY: Primary | ICD-10-CM

## 2017-06-12 PROCEDURE — 93798 PHYS/QHP OP CAR RHAB W/ECG: CPT

## 2017-06-12 NOTE — PROGRESS NOTES
Phase II patient, see  [place] documentation for exercise data documentation.  Dr. Arredondo physician immediately available. NAD note, skin w/p/d, denies CP, SOA, tolerated exercise well.

## 2017-06-13 ENCOUNTER — APPOINTMENT (OUTPATIENT)
Dept: CARDIAC REHAB | Facility: HOSPITAL | Age: 77
End: 2017-06-13

## 2017-06-14 ENCOUNTER — TREATMENT (OUTPATIENT)
Dept: CARDIAC REHAB | Facility: HOSPITAL | Age: 77
End: 2017-06-14

## 2017-06-14 VITALS — DIASTOLIC BLOOD PRESSURE: 74 MMHG | SYSTOLIC BLOOD PRESSURE: 124 MMHG | HEART RATE: 66 BPM

## 2017-06-14 DIAGNOSIS — Z98.61 S/P CORONARY ANGIOPLASTY: Primary | ICD-10-CM

## 2017-06-14 PROCEDURE — 93798 PHYS/QHP OP CAR RHAB W/ECG: CPT

## 2017-06-14 NOTE — PROGRESS NOTES
Phase II patient, see  [place] documentation for exercise data documentation.  Dr. Blanco physician immediately available. NAD note, skin w/p/d, denies CP, SOA, tolerated exercise well.

## 2017-06-16 ENCOUNTER — APPOINTMENT (OUTPATIENT)
Dept: CARDIAC REHAB | Facility: HOSPITAL | Age: 77
End: 2017-06-16

## 2017-06-19 ENCOUNTER — APPOINTMENT (OUTPATIENT)
Dept: CARDIAC REHAB | Facility: HOSPITAL | Age: 77
End: 2017-06-19

## 2017-06-20 ENCOUNTER — TREATMENT (OUTPATIENT)
Dept: CARDIAC REHAB | Facility: HOSPITAL | Age: 77
End: 2017-06-20

## 2017-06-20 VITALS — DIASTOLIC BLOOD PRESSURE: 70 MMHG | SYSTOLIC BLOOD PRESSURE: 130 MMHG | OXYGEN SATURATION: 98 % | HEART RATE: 67 BPM

## 2017-06-20 DIAGNOSIS — Z98.61 S/P CORONARY ANGIOPLASTY: Primary | ICD-10-CM

## 2017-06-20 PROCEDURE — 93798 PHYS/QHP OP CAR RHAB W/ECG: CPT

## 2017-06-20 NOTE — PROGRESS NOTES
Phase II patient, see  [place] documentation for exercise data documentation.  Dr. Blanco physician immediately available. NAD note, skin w/p/d, denies CP, SOA, tolerated exercise well.

## 2017-06-21 ENCOUNTER — TREATMENT (OUTPATIENT)
Dept: CARDIAC REHAB | Facility: HOSPITAL | Age: 77
End: 2017-06-21

## 2017-06-21 VITALS — SYSTOLIC BLOOD PRESSURE: 138 MMHG | HEART RATE: 66 BPM | DIASTOLIC BLOOD PRESSURE: 80 MMHG | OXYGEN SATURATION: 100 %

## 2017-06-21 DIAGNOSIS — Z98.61 S/P CORONARY ANGIOPLASTY: Primary | ICD-10-CM

## 2017-06-21 PROCEDURE — 93798 PHYS/QHP OP CAR RHAB W/ECG: CPT

## 2017-06-21 NOTE — PROGRESS NOTES
Phase II patient, see  [place] documentation for exercise data documentation.  Dr. Blanco physician immediately available. NAD note, skin w/p/d, denies CP, SOA, tolerated exercise well.

## 2017-06-23 ENCOUNTER — TREATMENT (OUTPATIENT)
Dept: CARDIAC REHAB | Facility: HOSPITAL | Age: 77
End: 2017-06-23

## 2017-06-23 VITALS — OXYGEN SATURATION: 98 % | SYSTOLIC BLOOD PRESSURE: 120 MMHG | DIASTOLIC BLOOD PRESSURE: 70 MMHG | HEART RATE: 76 BPM

## 2017-06-23 DIAGNOSIS — Z98.61 S/P CORONARY ANGIOPLASTY: Primary | ICD-10-CM

## 2017-06-23 PROCEDURE — 93798 PHYS/QHP OP CAR RHAB W/ECG: CPT

## 2017-06-23 NOTE — PROGRESS NOTES
Phase II patient, see  [place] documentation for exercise data documentation.  Dr. Olsen physician immediately available. NAD note, skin w/p/d, denies CP, SOA, tolerated exercise well.

## 2017-06-26 ENCOUNTER — TREATMENT (OUTPATIENT)
Dept: CARDIAC REHAB | Facility: HOSPITAL | Age: 77
End: 2017-06-26

## 2017-06-26 VITALS — SYSTOLIC BLOOD PRESSURE: 144 MMHG | HEART RATE: 67 BPM | DIASTOLIC BLOOD PRESSURE: 90 MMHG

## 2017-06-26 DIAGNOSIS — Z98.61 S/P CORONARY ANGIOPLASTY: Primary | ICD-10-CM

## 2017-06-26 PROCEDURE — 93798 PHYS/QHP OP CAR RHAB W/ECG: CPT

## 2017-06-26 NOTE — PROGRESS NOTES
Phase II patient, see  [place] documentation for exercise data documentation.  Dr. Blanco physician immediately available. NAD note, skin w/p/d, denies CP, SOA, tolerated exercise well.

## 2017-06-28 ENCOUNTER — TREATMENT (OUTPATIENT)
Dept: CARDIAC REHAB | Facility: HOSPITAL | Age: 77
End: 2017-06-28

## 2017-06-28 VITALS — SYSTOLIC BLOOD PRESSURE: 94 MMHG | DIASTOLIC BLOOD PRESSURE: 60 MMHG | HEART RATE: 74 BPM

## 2017-06-28 DIAGNOSIS — Z98.61 S/P CORONARY ANGIOPLASTY: Primary | ICD-10-CM

## 2017-06-28 PROCEDURE — 93798 PHYS/QHP OP CAR RHAB W/ECG: CPT

## 2017-06-28 NOTE — PROGRESS NOTES
Phase II patient, see  [place] documentation for exercise data documentation.  Dr. Blanco physician immediately available. NAD note, skin w/p/d, denies CP, SOA, tolerated exercise well.

## 2017-06-30 ENCOUNTER — APPOINTMENT (OUTPATIENT)
Dept: CARDIAC REHAB | Facility: HOSPITAL | Age: 77
End: 2017-06-30

## 2017-07-05 ENCOUNTER — TREATMENT (OUTPATIENT)
Dept: CARDIAC REHAB | Facility: HOSPITAL | Age: 77
End: 2017-07-05

## 2017-07-05 VITALS — OXYGEN SATURATION: 98 % | DIASTOLIC BLOOD PRESSURE: 70 MMHG | SYSTOLIC BLOOD PRESSURE: 138 MMHG | HEART RATE: 80 BPM

## 2017-07-05 DIAGNOSIS — Z98.61 S/P CORONARY ANGIOPLASTY: Primary | ICD-10-CM

## 2017-07-05 PROCEDURE — 93798 PHYS/QHP OP CAR RHAB W/ECG: CPT

## 2017-07-05 NOTE — PROGRESS NOTES
Phase II patient, see  [place] documentation for exercise data documentation.    physician immediately available. NAD note, skin w/p/d, denies CP, SOA, tolerated exercise well.

## 2017-07-07 ENCOUNTER — TREATMENT (OUTPATIENT)
Dept: CARDIAC REHAB | Facility: HOSPITAL | Age: 77
End: 2017-07-07

## 2017-07-07 VITALS — DIASTOLIC BLOOD PRESSURE: 70 MMHG | HEART RATE: 68 BPM | SYSTOLIC BLOOD PRESSURE: 126 MMHG | OXYGEN SATURATION: 98 %

## 2017-07-07 DIAGNOSIS — Z98.61 S/P CORONARY ANGIOPLASTY: Primary | ICD-10-CM

## 2017-07-07 PROCEDURE — 93798 PHYS/QHP OP CAR RHAB W/ECG: CPT

## 2017-07-10 ENCOUNTER — TREATMENT (OUTPATIENT)
Dept: CARDIAC REHAB | Facility: HOSPITAL | Age: 77
End: 2017-07-10

## 2017-07-10 VITALS — DIASTOLIC BLOOD PRESSURE: 68 MMHG | HEART RATE: 69 BPM | OXYGEN SATURATION: 98 % | SYSTOLIC BLOOD PRESSURE: 120 MMHG

## 2017-07-10 DIAGNOSIS — Z98.61 S/P CORONARY ANGIOPLASTY: Primary | ICD-10-CM

## 2017-07-10 PROCEDURE — 93798 PHYS/QHP OP CAR RHAB W/ECG: CPT

## 2017-07-12 ENCOUNTER — TREATMENT (OUTPATIENT)
Dept: CARDIAC REHAB | Facility: HOSPITAL | Age: 77
End: 2017-07-12

## 2017-07-12 VITALS — HEART RATE: 72 BPM | DIASTOLIC BLOOD PRESSURE: 64 MMHG | SYSTOLIC BLOOD PRESSURE: 102 MMHG

## 2017-07-12 DIAGNOSIS — Z98.61 S/P CORONARY ANGIOPLASTY: Primary | ICD-10-CM

## 2017-07-12 PROCEDURE — 93798 PHYS/QHP OP CAR RHAB W/ECG: CPT

## 2017-07-12 NOTE — PROGRESS NOTES
Phase II patient, see  [place] documentation for exercise data documentation.  Dr. Blanco physician immediately available. NAD note, skin w/p/d, denies CP, SOA, tolerated exercise well.

## 2017-07-14 ENCOUNTER — TREATMENT (OUTPATIENT)
Dept: CARDIAC REHAB | Facility: HOSPITAL | Age: 77
End: 2017-07-14

## 2017-07-14 VITALS — SYSTOLIC BLOOD PRESSURE: 120 MMHG | HEART RATE: 71 BPM | DIASTOLIC BLOOD PRESSURE: 80 MMHG

## 2017-07-14 DIAGNOSIS — Z98.61 S/P CORONARY ANGIOPLASTY: Primary | ICD-10-CM

## 2017-07-14 PROCEDURE — 93798 PHYS/QHP OP CAR RHAB W/ECG: CPT

## 2017-07-14 NOTE — PROGRESS NOTES
Phase II patient, see  [place] documentation for exercise data documentation.  Dr. Blanco physician immediately available. NAD note, skin w/p/d, denies CP, SOA, tolerated exercise well.

## 2017-07-17 ENCOUNTER — TREATMENT (OUTPATIENT)
Dept: CARDIAC REHAB | Facility: HOSPITAL | Age: 77
End: 2017-07-17

## 2017-07-17 VITALS — OXYGEN SATURATION: 98 % | DIASTOLIC BLOOD PRESSURE: 74 MMHG | HEART RATE: 70 BPM | SYSTOLIC BLOOD PRESSURE: 122 MMHG

## 2017-07-17 DIAGNOSIS — Z98.61 S/P CORONARY ANGIOPLASTY: Primary | ICD-10-CM

## 2017-07-17 PROCEDURE — 93798 PHYS/QHP OP CAR RHAB W/ECG: CPT

## 2017-07-17 NOTE — PATIENT INSTRUCTIONS
Managing Your Hypertension  Blood pressure is a measurement of how forceful your blood is pressing against the walls of the arteries. Arteries are muscular tubes within the circulatory system. Blood pressure does not stay the same. Blood pressure rises when you are active, excited, or nervous; and it lowers during sleep and relaxation. If the numbers measuring your blood pressure stay above normal most of the time, you are at risk for health problems. High blood pressure (hypertension) is a long-term (chronic) condition in which blood pressure is elevated.  A blood pressure reading is recorded as two numbers, such as 120 over 80 (or 120/80). The first, higher number is called the systolic pressure. It is a measure of the pressure in your arteries as the heart beats. The second, lower number is called the diastolic pressure. It is a measure of the pressure in your arteries as the heart relaxes between beats.   Keeping your blood pressure in a normal range is important to your overall health and prevention of health problems, such as heart disease and stroke. When your blood pressure is uncontrolled, your heart has to work harder than normal. High blood pressure is a very common condition in adults because blood pressure tends to rise with age. Men and women are equally likely to have hypertension but at different times in life. Before age 45, men are more likely to have hypertension. After 65 years of age, women are more likely to have it. Hypertension is especially common in  Americans. This condition often has no signs or symptoms. The cause of the condition is usually not known. Your caregiver can help you come up with a plan to keep your blood pressure in a normal, healthy range.  BLOOD PRESSURE STAGES  Blood pressure is classified into four stages: normal, prehypertension, stage 1, and stage 2. Your blood pressure reading will be used to determine what type of treatment, if any, is necessary. Appropriate  treatment options are tied to these four stages:   Normal  · Systolic pressure (mm Hg): below 120.  · Diastolic pressure (mm Hg): below 80.  Prehypertension  · Systolic pressure (mm Hg): 120 to 139.  · Diastolic pressure (mm Hg): 80 to 89.  Stage 1  · Systolic pressure (mm Hg): 140 to 159.  · Diastolic pressure (mm Hg): 90 to 99.  Stage 2  · Systolic pressure (mm Hg): 160 or above.  · Diastolic pressure (mm Hg): 100 or above.  RISKS RELATED TO HIGH BLOOD PRESSURE  Managing your blood pressure is an important responsibility. Uncontrolled high blood pressure can lead to:  · A heart attack.  · A stroke.  · A weakened blood vessel (aneurysm).  · Heart failure.  · Kidney damage.  · Eye damage.  · Metabolic syndrome.  · Memory and concentration problems.  HOW TO MANAGE YOUR BLOOD PRESSURE  Blood pressure can be managed effectively with lifestyle changes and medicines (if needed). Your caregiver will help you come up with a plan to bring your blood pressure within a normal range. Your plan should include the following:  Education  · Read all information provided by your caregivers about how to control blood pressure.  · Educate yourself on the latest guidelines and treatment recommendations. New research is always being done to further define the risks and treatments for high blood pressure.  Lifestyle changes  · Control your weight.  · Avoid smoking.  · Stay physically active.  · Reduce the amount of salt in your diet.  · Reduce stress.  · Control any chronic conditions, such as high cholesterol or diabetes.  · Reduce your alcohol intake.  Medicines  · Several medicines (antihypertensive medicines) are available, if needed, to bring blood pressure within a normal range.  Communication  · Review all the medicines you take with your caregiver because there may be side effects or interactions.  · Talk with your caregiver about your diet, exercise habits, and other lifestyle factors that may be contributing to high blood  pressure.  · See your caregiver regularly. Your caregiver can help you create and adjust your plan for managing high blood pressure.  RECOMMENDATIONS FOR TREATMENT AND FOLLOW-UP   The following recommendations are based on current guidelines for managing high blood pressure in nonpregnant adults. Use these recommendations to identify the proper follow-up period or treatment option based on your blood pressure reading. You can discuss these options with your caregiver.  · Systolic pressure of 120 to 139 or diastolic pressure of 80 to 89: Follow up with your caregiver as directed.  · Systolic pressure of 140 to 160 or diastolic pressure of 90 to 100: Follow up with your caregiver within 2 months.  · Systolic pressure above 160 or diastolic pressure above 100: Follow up with your caregiver within 1 month.  · Systolic pressure above 180 or diastolic pressure above 110: Consider antihypertensive therapy; follow up with your caregiver within 1 week.  · Systolic pressure above 200 or diastolic pressure above 120: Begin antihypertensive therapy; follow up with your caregiver within 1 week.     This information is not intended to replace advice given to you by your health care provider. Make sure you discuss any questions you have with your health care provider.     Document Released: 09/11/2013 Document Reviewed: 09/11/2013  [employer] Interactive Patient Education ©2017 [employer]  Heart-Healthy Eating Plan  Many factors influence your heart health, including eating and exercise habits. Heart (coronary) risk increases with abnormal blood fat (lipid) levels. Heart-healthy meal planning includes limiting unhealthy fats, increasing healthy fats, and making other small dietary changes. This includes maintaining a healthy body weight to help keep lipid levels within a normal range.  WHAT IS MY PLAN?   Your health care provider recommends that you:  · Get no more than _________% of the total calories in your daily diet from  "fat.  · Limit your intake of saturated fat to less than _________% of your total calories each day.  · Limit the amount of cholesterol in your diet to less than _________ mg per day.  WHAT TYPES OF FAT SHOULD I CHOOSE?  · Choose healthy fats more often. Choose monounsaturated and polyunsaturated fats, such as olive oil and canola oil, flaxseeds, walnuts, almonds, and seeds.  · Eat more omega-3 fats. Good choices include salmon, mackerel, sardines, tuna, flaxseed oil, and ground flaxseeds. Aim to eat fish at least two times each week.  · Limit saturated fats. Saturated fats are primarily found in animal products, such as meats, butter, and cream. Plant sources of saturated fats include palm oil, palm kernel oil, and coconut oil.  · Avoid foods with partially hydrogenated oils in them. These contain trans fats. Examples of foods that contain trans fats are stick margarine, some tub margarines, cookies, crackers, and other baked goods.  WHAT GENERAL GUIDELINES DO I NEED TO FOLLOW?  · Check food labels carefully to identify foods with trans fats or high amounts of saturated fat.  · Fill one half of your plate with vegetables and green salads. Eat 4-5 servings of vegetables per day. A serving of vegetables equals 1 cup of raw leafy vegetables, ½ cup of raw or cooked cut-up vegetables, or ½ cup of vegetable juice.  · Fill one fourth of your plate with whole grains. Look for the word \"whole\" as the first word in the ingredient list.  · Fill one fourth of your plate with lean protein foods.  · Eat 4-5 servings of fruit per day. A serving of fruit equals one medium whole fruit, ¼ cup of dried fruit, ½ cup of fresh, frozen, or canned fruit, or ½ cup of 100% fruit juice.  · Eat more foods that contain soluble fiber. Examples of foods that contain this type of fiber are apples, broccoli, carrots, beans, peas, and barley. Aim to get 20-30 g of fiber per day.  · Eat more home-cooked food and less restaurant, buffet, and fast " food.  · Limit or avoid alcohol.  · Limit foods that are high in starch and sugar.  · Avoid fried foods.  · Cook foods by using methods other than frying. Baking, boiling, grilling, and broiling are all great options. Other fat-reducing suggestions include:    Removing the skin from poultry.    Removing all visible fats from meats.    Skimming the fat off of stews, soups, and gravies before serving them.    Steaming vegetables in water or broth.  · Lose weight if you are overweight. Losing just 5-10% of your initial body weight can help your overall health and prevent diseases such as diabetes and heart disease.  · Increase your consumption of nuts, legumes, and seeds to 4-5 servings per week. One serving of dried beans or legumes equals ½ cup after being cooked, one serving of nuts equals 1½ ounces, and one serving of seeds equals ½ ounce or 1 tablespoon.  · You may need to monitor your salt (sodium) intake, especially if you have high blood pressure. Talk with your health care provider or dietitian to get more information about reducing sodium.  WHAT FOODS CAN I EAT?  Grains  Breads, including [place], white, emely, wheat, raisin, rye, oatmeal, and Italian. Tortillas that are neither fried nor made with lard or trans fat. Low-fat rolls, including hotdog and hamburger buns and English muffins. Biscuits. Muffins. Waffles. Pancakes. Light popcorn. Whole-grain cereals. Flatbread. Sodus toast. Pretzels. Breadsticks. Rusks. Low-fat snacks and crackers, including oyster, saltine, matzo, phong, animal, and rye. Rice and pasta, including brown rice and those that are made with whole wheat.  Vegetables  All vegetables.  Fruits  All fruits, but limit coconut.  Meats and Other Protein Sources  Lean, well-trimmed beef, veal, pork, and lamb. Chicken and turkey without skin. All fish and shellfish. Wild duck, rabbit, pheasant, and venison. Egg whites or low-cholesterol egg substitutes. Dried beans, peas, lentils, and tofu. Seeds  and most nuts.  Dairy  Low-fat or nonfat cheeses, including ricotta, string, and mozzarella. Skim or 1% milk that is liquid, powdered, or evaporated. Buttermilk that is made with low-fat milk. Nonfat or low-fat yogurt.  Beverages  Mineral water. Diet carbonated beverages.  Sweets and Desserts  Sherbets and fruit ices. Honey, jam, marmalade, jelly, and syrups. Meringues and gelatins. Pure sugar candy, such as hard candy, jelly beans, gumdrops, mints, marshmallows, and small amounts of dark chocolate. Jefe food cake.  Eat all sweets and desserts in moderation.  Fats and Oils  Nonhydrogenated (trans-free) margarines. Vegetable oils, including soybean, sesame, sunflower, olive, peanut, safflower, corn, canola, and cottonseed. Salad dressings or mayonnaise that are made with a vegetable oil. Limit added fats and oils that you use for cooking, baking, salads, and as spreads.  Other  Cocoa powder. Coffee and tea. All seasonings and condiments.  The items listed above may not be a complete list of recommended foods or beverages. Contact your dietitian for more options.  WHAT FOODS ARE NOT RECOMMENDED?  Grains  Breads that are made with saturated or trans fats, oils, or whole milk. Croissants. Butter rolls. Cheese breads. Sweet rolls. Donuts. Buttered popcorn. Chow mein noodles. High-fat crackers, such as cheese or butter crackers.  Meats and Other Protein Sources  Fatty meats, such as hotdogs, short ribs, sausage, spareribs, miles, ribeye roast or steak, and mutton. High-fat deli meats, such as salami and bologna. Caviar. Domestic duck and goose. Organ meats, such as kidney, liver, sweetbreads, brains, gizzard, chitterlings, and heart.  Dairy  Cream, sour cream, cream cheese, and creamed cottage cheese. Whole milk cheeses, including blue (fabian), [place] Richard, Brie, Gael, American, Havarti, Swiss, cheddar, Camembert, and Tiffanie.  Whole or 2% milk that is liquid, evaporated, or condensed. Whole buttermilk. Cream sauce or  high-fat cheese sauce. Yogurt that is made from whole milk.  Beverages  Regular sodas and drinks with added sugar.  Sweets and Desserts  Frosting. Pudding. Cookies. Cakes other than su food cake. Candy that has milk chocolate or white chocolate, hydrogenated fat, butter, coconut, or unknown ingredients. Buttered syrups. Full-fat ice cream or ice cream drinks.  Fats and Oils  Gravy that has suet, meat fat, or shortening. Cocoa butter, hydrogenated oils, palm oil, coconut oil, palm kernel oil. These can often be found in baked products, candy, fried foods, nondairy creamers, and whipped toppings. Solid fats and shortenings, including miels fat, salt pork, lard, and butter. Nondairy cream substitutes, such as coffee creamers and sour cream substitutes. Salad dressings that are made of unknown oils, cheese, or sour cream.  The items listed above may not be a complete list of foods and beverages to avoid. Contact your dietitian for more information.     This information is not intended to replace advice given to you by your health care provider. Make sure you discuss any questions you have with your health care provider.     Document Released: 09/26/2009 Document Revised: 01/08/2016 Document Reviewed: 06/11/2015  [employer] Interactive Patient Education ©2017 [employer]    Cholesterol  Cholesterol is a fat. Your body needs a small amount of cholesterol. Cholesterol may build up in your blood vessels. This increases your chance of having a heart attack or stroke.  You cannot feel your cholesterol levels. The only way to know your cholesterol level is high is with a blood test. Keep your test results. Work with your doctor to keep your cholesterol at a good level.  WHAT DO THE TEST RESULTS MEAN?  · Total cholesterol is how much cholesterol is in your blood.  · LDL is bad cholesterol. This is the type that can build up. You want LDL to be low.  · HDL is good cholesterol. It cleans your blood vessels and carries LDL  away. You want HDL to be high.  · Triglycerides are fat that the body can burn for energy or store.  WHAT ARE GOOD LEVELS OF CHOLESTEROL?  · Total cholesterol below 200.  · LDL below 100 for people at risk. Below 70 for those at very high risk.  · HDL above 50 is good. Above 60 is best.  · Triglycerides below 150.  HOW CAN I LOWER MY CHOLESTEROL?  · Diet. Follow your diet programs as told by your doctor.    Choose fish, white meat chicken, roasted turkey, or baked turkey. Try not to eat red meat, fried foods, or processed meats such as sausage and lunch meats.    Eat lots of fresh fruits and vegetables.    Choose whole grains, beans, pasta, potatoes, and cereals.    Use only small amounts of olive, corn, or canola oils.    Try not to eat butter, mayonnaise, shortening, or palm kernel oils.    Try not to eat foods with trans fats.    Drink skim or nonfat milk. Eat low-fat or nonfat yogurt and cheeses. Try not to drink whole milk or cream. Try not to eat ice cream, egg yolks, and full-fat cheeses.    Healthy desserts include su food cake, luis fernando snaps, animal crackers, hard candy, popsicles, and low-fat or nonfat frozen yogurt. Try not to eat pastries, cakes, pies, and cookies.  · Exercise. Follow your exercise programs as told by your doctor.    Be more active. You can try gardening, walking, or taking the stairs. Ask your doctor about how you can be more active.  · Medicine. Take medicine as told by your doctor.     This information is not intended to replace advice given to you by your health care provider. Make sure you discuss any questions you have with your health care provider.     Document Released: 03/16/2010 Document Revised: 01/08/2016 Document Reviewed: 10/01/2014  [employer] Interactive Patient Education ©2017 [employer]    How to Avoid Diabetes Problems  You can do a lot to prevent or slow down diabetes problems. Following your diabetes plan and taking care of yourself can reduce your risk of  serious or life-threatening complications. Below, you will find certain things you can do to prevent diabetes problems.  MANAGE YOUR DIABETES  Follow your health care provider's, nurse educator's, and dietitian's instructions for managing your diabetes. They will teach you the basics of diabetes care. They can help answer questions you may have. Learn about diabetes and make healthy choices regarding eating and physical activity. Monitor your blood glucose level regularly. Your health care provider will help you decide how often to check your blood glucose level depending on your treatment goals and how well you are meeting them.   DO NOT USE NICOTINE  Nicotine and diabetes are a dangerous combination. Nicotine raises your risk for diabetes problems. If you quit using nicotine, you will lower your risk for heart attack, stroke, nerve disease, and kidney disease. Your cholesterol and your blood pressure levels may improve. Your blood circulation will also improve. Do not use any tobacco products, including cigarettes, chewing tobacco, or electronic cigarettes. If you need help quitting, ask your health care provider.  KEEP YOUR BLOOD PRESSURE UNDER CONTROL  Your health care provider will determine your individualized target blood pressure based on your age, your medicines, how long you have had diabetes, and any other medical conditions you have. Blood pressure consists of two numbers. Generally, the goal is to keep your top number (systolic pressure) at or below 130, and your bottom number (diastolic pressure) at or below 80. Your health care provider may recommend a lower target blood pressure reading, if appropriate. Meal planning, medicines, and exercise can help you reach your target blood pressure. Make sure your health care provider checks your blood pressure at every visit.  KEEP YOUR CHOLESTEROL UNDER CONTROL  Normal cholesterol levels will help prevent heart disease and stroke. These are the biggest health  problems for people with diabetes. Keeping cholesterol levels under control can also help with blood flow. Have your cholesterol level checked at least once a year. Your health care provider may prescribe a medicine known as a statin. Statins lower your cholesterol. If you are not taking a statin, ask your health care provider if you should be. Meal planning, exercise, and medicines can help you reach your cholesterol targets.   SCHEDULE AND KEEP YOUR ANNUAL PHYSICAL EXAMS AND EYE EXAMS  Your health care provider will tell you how often he or she wants to see you depending on your plan of treatment. It is important that you keep these appointments so that possible problems can be identified early and complications can be avoided or treated.  · Every visit with your health care provider should include your weight, blood pressure, and an evaluation of your blood glucose control.  · Your hemoglobin A1c should be checked:    At least twice a year if you are at your goal.    Every 3 months if there are changes in treatment.    If you are not meeting your goals.  · Your blood lipids should be checked yearly. You should also be checked yearly to see if you have protein in your urine (microalbumin).  · If you have type 1 diabetes, have an eye exam 3-5 years after you are diagnosed, and then once per year after your first exam.  · If you have type 2 diabetes, have an eye exam as soon as you are diagnosed, and then once per year after your first exam.  KEEP YOUR VACCINES CURRENT  It is recommended that you receive a flu (influenza) vaccine every year. It is also recommended that you receive a pneumonia (pneumococcal) vaccine and a hepatitis B vaccine. If you are 65 years of age or older and have never received a pneumonia vaccine, this vaccine may be given as a series of two separate shots. Ask your health care provider which additional vaccines may be recommended.  TAKE CARE OF YOUR FEET   Diabetes may cause you to have a  poor blood supply (circulation) to your legs and feet. Because of this, the skin may be thinner, break easier, and heal more slowly. You also may have nerve damage in your legs and feet, causing decreased feeling. You may not notice minor injuries to your feet that could lead to serious problems or infections. Taking care of your feet is very important.  Visual foot exams are performed at every routine medical visit. The exams check for cuts, injuries, or other problems with the feet. A comprehensive foot exam should be done yearly. This includes visual inspection as well as assessing foot pulses and testing for loss of sensation. You should also do the following:  · Inspect your feet daily for cuts, calluses, blisters, ingrown toenails, and signs of infection, such as redness, swelling, or pus.  · Wash and dry your feet thoroughly, especially between the toes.  · Avoid soaking your feet regularly in hot water baths.  · Moisturize dry skin with lotion, avoiding areas between your toes.  · Cut toenails straight across and file the edges.  · Avoid shoes that do not fit well or have areas that irritate your skin.  · Avoid going barefooted or wearing only socks. Your feet need protection.  TAKE CARE OF YOUR TEETH  People with poorly controlled diabetes are more likely to have gum (periodontal) disease. These infections make diabetes harder to control. Periodontal diseases, if left untreated, can lead to tooth loss. Brush your teeth twice a day, floss, and see your dentist for checkups and cleaning every 6 months, or 2 times a year.  ASK YOUR HEALTH CARE PROVIDER ABOUT TAKING ASPIRIN  Taking aspirin daily is recommended to help prevent cardiovascular disease in people with and without diabetes. Ask your health care provider if this would benefit you and what dose he or she would recommend.  DRINK RESPONSIBLY  Moderate amounts of alcohol (less than 1 drink per day for adult women and less than 2 drinks per day for adult  men) have a minimal effect on blood glucose if ingested with food. It is important to eat food with alcohol to avoid hypoglycemia. People should avoid alcohol if they have a history of alcohol abuse or dependence, if they are pregnant, and if they have liver disease, pancreatitis, advanced neuropathy, or severe hypertriglyceridemia.  LESSEN STRESS  Living with diabetes can be stressful. When you are under stress, your blood glucose may be affected in two ways:  · Stress hormones may cause your blood glucose to rise.  · You may be distracted from taking good care of yourself.  It is a good idea to be aware of your stress level and make changes that are necessary to help you better manage challenging situations. Support groups, planned relaxation, a hobby you enjoy, meditation, healthy relationships, and exercise all work to lower your stress level. If your efforts do not seem to be helping, get help from your health care provider or a trained mental health professional.     This information is not intended to replace advice given to you by your health care provider. Make sure you discuss any questions you have with your health care provider.     Document Released: 09/04/2012 Document Revised: 04/10/2017 Document Reviewed: 02/11/2015  [employer] Interactive Patient Education ©2017 [employer]

## 2017-07-17 NOTE — PROGRESS NOTES
Phase II patient, see  [place] documentation for exercise data documentation.  Dr. Blanco physician immediately available. NAD note, skin w/p/d, denies CP, SOA, tolerated exercise well.     Discussed  Eloina's goal set at the beginning of the program of beginning a personal exercise program. Informed patient of options for exercise after discharge, including Phase III. She is to start the Phase III program on 7/19/2017.

## 2017-07-19 ENCOUNTER — TREATMENT (OUTPATIENT)
Dept: CARDIAC REHAB | Facility: HOSPITAL | Age: 77
End: 2017-07-19

## 2017-07-19 VITALS — SYSTOLIC BLOOD PRESSURE: 96 MMHG | HEART RATE: 80 BPM | DIASTOLIC BLOOD PRESSURE: 70 MMHG

## 2017-07-19 DIAGNOSIS — Z98.61 S/P CORONARY ANGIOPLASTY: Primary | ICD-10-CM

## 2017-07-19 PROCEDURE — 93798 PHYS/QHP OP CAR RHAB W/ECG: CPT

## 2017-07-19 NOTE — PROGRESS NOTES
Phase III patient, see  Exercise flow sheet for documentation for exercise data documentation.  Dr. Blanco physician immediately available. NAD note, skin w/p/d, denies CP, SOA, tolerated exercise well.

## 2017-07-21 ENCOUNTER — TREATMENT (OUTPATIENT)
Dept: CARDIAC REHAB | Facility: HOSPITAL | Age: 77
End: 2017-07-21

## 2017-07-21 VITALS — SYSTOLIC BLOOD PRESSURE: 106 MMHG | HEART RATE: 78 BPM | DIASTOLIC BLOOD PRESSURE: 64 MMHG

## 2017-07-21 DIAGNOSIS — Z98.61 S/P CORONARY ANGIOPLASTY: Primary | ICD-10-CM

## 2017-07-21 NOTE — PROGRESS NOTES
Phase III patient, see  Exercise flow sheet for documentation for exercise data documentation.  Dr. Robert physician immediately available. NAD note, skin w/p/d, denies CP, SOA, tolerated exercise well.

## 2017-08-02 ENCOUNTER — APPOINTMENT (OUTPATIENT)
Dept: CARDIAC REHAB | Facility: HOSPITAL | Age: 77
End: 2017-08-02

## 2017-08-04 ENCOUNTER — APPOINTMENT (OUTPATIENT)
Dept: CARDIAC REHAB | Facility: HOSPITAL | Age: 77
End: 2017-08-04

## 2017-08-07 ENCOUNTER — APPOINTMENT (OUTPATIENT)
Dept: CARDIAC REHAB | Facility: HOSPITAL | Age: 77
End: 2017-08-07

## 2017-08-07 ENCOUNTER — TRANSCRIBE ORDERS (OUTPATIENT)
Dept: ADMINISTRATIVE | Facility: HOSPITAL | Age: 77
End: 2017-08-07

## 2017-08-07 DIAGNOSIS — R10.32 LLQ ABDOMINAL PAIN: Primary | ICD-10-CM

## 2017-08-08 ENCOUNTER — APPOINTMENT (OUTPATIENT)
Dept: CT IMAGING | Facility: HOSPITAL | Age: 77
End: 2017-08-08
Attending: SURGERY

## 2017-08-09 ENCOUNTER — APPOINTMENT (OUTPATIENT)
Dept: CARDIAC REHAB | Facility: HOSPITAL | Age: 77
End: 2017-08-09

## 2017-08-11 ENCOUNTER — APPOINTMENT (OUTPATIENT)
Dept: CARDIAC REHAB | Facility: HOSPITAL | Age: 77
End: 2017-08-11

## 2017-08-14 ENCOUNTER — TREATMENT (OUTPATIENT)
Dept: CARDIAC REHAB | Facility: HOSPITAL | Age: 77
End: 2017-08-14

## 2017-08-14 VITALS — DIASTOLIC BLOOD PRESSURE: 70 MMHG | HEART RATE: 64 BPM | SYSTOLIC BLOOD PRESSURE: 104 MMHG

## 2017-08-14 DIAGNOSIS — Z98.61 S/P CORONARY ANGIOPLASTY: Primary | ICD-10-CM

## 2017-08-14 RX ORDER — RANOLAZINE 500 MG/1
500 TABLET, EXTENDED RELEASE ORAL 2 TIMES DAILY
COMMUNITY
End: 2017-09-18 | Stop reason: DRUGHIGH

## 2017-08-14 NOTE — PROGRESS NOTES
Phase III patient, see  Exercise flow sheet for documentation for exercise data documentation.  NAD note, skin w/p/d, denies CP, SOA, tolerated exercise well.

## 2017-08-16 ENCOUNTER — TREATMENT (OUTPATIENT)
Dept: CARDIAC REHAB | Facility: HOSPITAL | Age: 77
End: 2017-08-16

## 2017-08-16 VITALS — DIASTOLIC BLOOD PRESSURE: 60 MMHG | SYSTOLIC BLOOD PRESSURE: 118 MMHG | HEART RATE: 70 BPM

## 2017-08-16 DIAGNOSIS — Z98.61 S/P CORONARY ANGIOPLASTY: Primary | ICD-10-CM

## 2017-08-18 ENCOUNTER — TREATMENT (OUTPATIENT)
Dept: CARDIAC REHAB | Facility: HOSPITAL | Age: 77
End: 2017-08-18

## 2017-08-18 VITALS — OXYGEN SATURATION: 98 % | SYSTOLIC BLOOD PRESSURE: 110 MMHG | DIASTOLIC BLOOD PRESSURE: 60 MMHG | HEART RATE: 56 BPM

## 2017-08-18 DIAGNOSIS — Z98.61 S/P CORONARY ANGIOPLASTY: Primary | ICD-10-CM

## 2017-08-21 ENCOUNTER — APPOINTMENT (OUTPATIENT)
Dept: CARDIAC REHAB | Facility: HOSPITAL | Age: 77
End: 2017-08-21

## 2017-08-23 ENCOUNTER — TREATMENT (OUTPATIENT)
Dept: CARDIAC REHAB | Facility: HOSPITAL | Age: 77
End: 2017-08-23

## 2017-08-23 VITALS — OXYGEN SATURATION: 98 % | HEART RATE: 73 BPM | SYSTOLIC BLOOD PRESSURE: 120 MMHG | DIASTOLIC BLOOD PRESSURE: 70 MMHG

## 2017-08-23 DIAGNOSIS — Z98.61 S/P CORONARY ANGIOPLASTY: Primary | ICD-10-CM

## 2017-08-23 PROCEDURE — 93798 PHYS/QHP OP CAR RHAB W/ECG: CPT

## 2017-08-24 ENCOUNTER — TREATMENT (OUTPATIENT)
Dept: CARDIAC REHAB | Facility: HOSPITAL | Age: 77
End: 2017-08-24

## 2017-08-24 VITALS — DIASTOLIC BLOOD PRESSURE: 80 MMHG | HEART RATE: 66 BPM | SYSTOLIC BLOOD PRESSURE: 136 MMHG

## 2017-08-24 DIAGNOSIS — Z98.61 S/P CORONARY ANGIOPLASTY: Primary | ICD-10-CM

## 2017-08-25 ENCOUNTER — APPOINTMENT (OUTPATIENT)
Dept: CARDIAC REHAB | Facility: HOSPITAL | Age: 77
End: 2017-08-25

## 2017-08-28 ENCOUNTER — APPOINTMENT (OUTPATIENT)
Dept: CARDIAC REHAB | Facility: HOSPITAL | Age: 77
End: 2017-08-28

## 2017-08-30 ENCOUNTER — TREATMENT (OUTPATIENT)
Dept: CARDIAC REHAB | Facility: HOSPITAL | Age: 77
End: 2017-08-30

## 2017-08-30 VITALS — OXYGEN SATURATION: 100 % | DIASTOLIC BLOOD PRESSURE: 80 MMHG | HEART RATE: 72 BPM | SYSTOLIC BLOOD PRESSURE: 126 MMHG

## 2017-08-30 DIAGNOSIS — Z98.61 S/P CORONARY ANGIOPLASTY: Primary | ICD-10-CM

## 2017-09-01 ENCOUNTER — APPOINTMENT (OUTPATIENT)
Dept: CARDIAC REHAB | Facility: HOSPITAL | Age: 77
End: 2017-09-01

## 2017-09-06 ENCOUNTER — APPOINTMENT (OUTPATIENT)
Dept: CARDIAC REHAB | Facility: HOSPITAL | Age: 77
End: 2017-09-06

## 2017-09-08 ENCOUNTER — TREATMENT (OUTPATIENT)
Dept: CARDIAC REHAB | Facility: HOSPITAL | Age: 77
End: 2017-09-08

## 2017-09-08 VITALS — HEART RATE: 72 BPM | DIASTOLIC BLOOD PRESSURE: 72 MMHG | SYSTOLIC BLOOD PRESSURE: 144 MMHG | OXYGEN SATURATION: 98 %

## 2017-09-08 DIAGNOSIS — Z98.61 S/P CORONARY ANGIOPLASTY: Primary | ICD-10-CM

## 2017-09-08 NOTE — PROGRESS NOTES
Phase III patient, see  Exercise flow sheet for documentation for exercise data documentation.  NAD note, skin w/p/d, denies CP, SOA, tolerated exercise well.     Patient brought her blood pressure machine in to see if our readings were close. Her right arm had a 10 mm Hg difference on top and 4 mm Hg on bottom.

## 2017-09-11 ENCOUNTER — TREATMENT (OUTPATIENT)
Dept: CARDIAC REHAB | Facility: HOSPITAL | Age: 77
End: 2017-09-11

## 2017-09-11 VITALS — DIASTOLIC BLOOD PRESSURE: 84 MMHG | SYSTOLIC BLOOD PRESSURE: 154 MMHG | HEART RATE: 71 BPM

## 2017-09-11 DIAGNOSIS — Z98.61 S/P CORONARY ANGIOPLASTY: Primary | ICD-10-CM

## 2017-09-11 PROCEDURE — 93798 PHYS/QHP OP CAR RHAB W/ECG: CPT

## 2017-09-12 ENCOUNTER — TREATMENT (OUTPATIENT)
Dept: CARDIAC REHAB | Facility: HOSPITAL | Age: 77
End: 2017-09-12

## 2017-09-12 VITALS — HEART RATE: 61 BPM | DIASTOLIC BLOOD PRESSURE: 72 MMHG | SYSTOLIC BLOOD PRESSURE: 124 MMHG

## 2017-09-12 DIAGNOSIS — Z98.61 S/P CORONARY ANGIOPLASTY: Primary | ICD-10-CM

## 2017-09-13 ENCOUNTER — APPOINTMENT (OUTPATIENT)
Dept: CARDIAC REHAB | Facility: HOSPITAL | Age: 77
End: 2017-09-13

## 2017-09-13 ENCOUNTER — TREATMENT (OUTPATIENT)
Dept: CARDIAC REHAB | Facility: HOSPITAL | Age: 77
End: 2017-09-13

## 2017-09-13 VITALS — HEART RATE: 72 BPM | SYSTOLIC BLOOD PRESSURE: 136 MMHG | DIASTOLIC BLOOD PRESSURE: 80 MMHG

## 2017-09-13 DIAGNOSIS — Z98.61 S/P CORONARY ANGIOPLASTY: Primary | ICD-10-CM

## 2017-09-15 ENCOUNTER — APPOINTMENT (OUTPATIENT)
Dept: CARDIAC REHAB | Facility: HOSPITAL | Age: 77
End: 2017-09-15

## 2017-09-18 ENCOUNTER — APPOINTMENT (OUTPATIENT)
Dept: CARDIAC REHAB | Facility: HOSPITAL | Age: 77
End: 2017-09-18

## 2017-09-18 ENCOUNTER — OFFICE VISIT (OUTPATIENT)
Dept: CARDIOLOGY | Facility: CLINIC | Age: 77
End: 2017-09-18

## 2017-09-18 VITALS
BODY MASS INDEX: 27.64 KG/M2 | OXYGEN SATURATION: 97 % | DIASTOLIC BLOOD PRESSURE: 85 MMHG | WEIGHT: 165.9 LBS | HEART RATE: 62 BPM | SYSTOLIC BLOOD PRESSURE: 175 MMHG | HEIGHT: 65 IN

## 2017-09-18 DIAGNOSIS — I10 ESSENTIAL HYPERTENSION: ICD-10-CM

## 2017-09-18 DIAGNOSIS — R07.9 CHEST PAIN, UNSPECIFIED TYPE: ICD-10-CM

## 2017-09-18 DIAGNOSIS — E78.2 MIXED HYPERLIPIDEMIA: Primary | ICD-10-CM

## 2017-09-18 DIAGNOSIS — I25.10 ASHD (ARTERIOSCLEROTIC HEART DISEASE): ICD-10-CM

## 2017-09-18 PROCEDURE — 99214 OFFICE O/P EST MOD 30 MIN: CPT | Performed by: INTERNAL MEDICINE

## 2017-09-18 RX ORDER — RANOLAZINE 1000 MG/1
1000 TABLET, EXTENDED RELEASE ORAL DAILY
COMMUNITY
End: 2022-01-01 | Stop reason: HOSPADM

## 2017-09-18 NOTE — PROGRESS NOTES
Subjective   Eloina Batista is a 77 y.o. female.     Chief Complaint   Patient presents with   • Follow-up   • ASHD       History of Present Illness     Eloina is a very pleasant 77-year-old woman who presents for follow-up of known coronary artery disease.  She initially underwent bypass surgery and multiple stent implantations since that time.  Her last stent was implanted in 2006.  She presented in March 2017 with relatively typical unstable angina.  Cardiac catheterization at that time demonstrated a right dominant coronary artery system with native single vessel disease involving the LAD and diagonal branch.  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  She underwent cutting balloon angioplasty to this vessel which was uncomplicated.  She currently states that she is doing much better.  She is participating in cardiac rehabilitation and notes that with activity she is no longer having any angina at all.  She denies any congestive heart failure symptoms.  She denies any palpitations, near syncope or syncopal symptoms.  She has been compliant with medical therapy.     The following portions of the patient's history were reviewed and updated as appropriate: allergies, current medications, past family history, past medical history, past social history, past surgical history and problem list.    Review of Systems   Constitutional: Negative for activity change, appetite change and fatigue.   HENT: Negative for congestion and tinnitus.    Eyes: Negative for visual disturbance.   Respiratory: Negative for cough, chest tightness and shortness of breath.    Cardiovascular: Negative for chest pain, palpitations and leg swelling.   Gastrointestinal: Negative for blood in stool, nausea and vomiting.   Endocrine: Negative for cold intolerance, heat intolerance and polyuria.   Genitourinary: Negative for dysuria.    Musculoskeletal: Negative for myalgias and neck pain.   Skin: Negative for rash.   Neurological: Negative for dizziness, syncope, weakness and light-headedness.   Hematological: Bruises/bleeds easily.   Psychiatric/Behavioral: Negative for confusion and sleep disturbance.       Objective   Physical Exam   Constitutional: She is oriented to person, place, and time. She appears well-developed and well-nourished. No distress.   HENT:   Head: Normocephalic and atraumatic.   Nose: Nose normal.   Mouth/Throat: Oropharynx is clear and moist.   Eyes: Conjunctivae and EOM are normal. Right eye exhibits no discharge. Left eye exhibits no discharge. No scleral icterus.   She recently underwent eyelid surgery and has ecchymosis for the top portion of her face   Neck: Normal range of motion. No hepatojugular reflux and no JVD present. Carotid bruit is not present. No tracheal deviation present.   Cardiovascular: Normal rate, regular rhythm, S1 normal, S2 normal and intact distal pulses.  PMI is not displaced.  Exam reveals no gallop, no S3, no S4 and no friction rub.    No murmur heard.  Pulmonary/Chest: Effort normal and breath sounds normal. No accessory muscle usage. No respiratory distress. She has no wheezes. She has no rales. She exhibits no tenderness.   Abdominal: Soft. Bowel sounds are normal. She exhibits no distension. There is no tenderness.   Musculoskeletal: Normal range of motion. She exhibits no edema or tenderness.       Vascular Status -  Her exam exhibits no right foot edema. Her exam exhibits no left foot edema.  Neurological: She is alert and oriented to person, place, and time. No cranial nerve deficit. Coordination normal.   Skin: Skin is warm and dry. She is not diaphoretic.   Nursing note and vitals reviewed.      Procedures    Assessment/Plan     Coronary Artery Disease.  I suspect that overall the patient's coronary artery disease is stable.   At this point she is more than 6 months post PCI and her  chance of recurrent restenosis is unlikely.  I've encouraged her to continue cardiac rehabilitation     Hypertension.  The home blood pressure diary suggests reasonable control of the patient's HTN.  At this time I will continue current medications as is.  I have asked them to maintain a blood pressure diary     Hyperlipidemia In regard to their history of hyperlipidemia, I have asked her to obtain a fasting lipid profile    In short it has been a pleasure to participate in Eloina's care, I look forward to seeing her again in 6 months

## 2017-09-20 ENCOUNTER — APPOINTMENT (OUTPATIENT)
Dept: CARDIAC REHAB | Facility: HOSPITAL | Age: 77
End: 2017-09-20

## 2017-09-22 ENCOUNTER — APPOINTMENT (OUTPATIENT)
Dept: CARDIAC REHAB | Facility: HOSPITAL | Age: 77
End: 2017-09-22

## 2017-09-25 ENCOUNTER — APPOINTMENT (OUTPATIENT)
Dept: CARDIAC REHAB | Facility: HOSPITAL | Age: 77
End: 2017-09-25

## 2017-09-27 ENCOUNTER — APPOINTMENT (OUTPATIENT)
Dept: CARDIAC REHAB | Facility: HOSPITAL | Age: 77
End: 2017-09-27

## 2017-09-29 ENCOUNTER — APPOINTMENT (OUTPATIENT)
Dept: CARDIAC REHAB | Facility: HOSPITAL | Age: 77
End: 2017-09-29

## 2017-10-02 ENCOUNTER — APPOINTMENT (OUTPATIENT)
Dept: CARDIAC REHAB | Facility: HOSPITAL | Age: 77
End: 2017-10-02

## 2017-10-04 ENCOUNTER — TREATMENT (OUTPATIENT)
Dept: CARDIAC REHAB | Facility: HOSPITAL | Age: 77
End: 2017-10-04

## 2017-10-04 VITALS — HEART RATE: 72 BPM | DIASTOLIC BLOOD PRESSURE: 74 MMHG | SYSTOLIC BLOOD PRESSURE: 112 MMHG

## 2017-10-04 DIAGNOSIS — Z98.61 S/P CORONARY ANGIOPLASTY: Primary | ICD-10-CM

## 2017-10-06 ENCOUNTER — TREATMENT (OUTPATIENT)
Dept: CARDIAC REHAB | Facility: HOSPITAL | Age: 77
End: 2017-10-06

## 2017-10-06 VITALS — DIASTOLIC BLOOD PRESSURE: 68 MMHG | OXYGEN SATURATION: 100 % | SYSTOLIC BLOOD PRESSURE: 110 MMHG | HEART RATE: 68 BPM

## 2017-10-06 DIAGNOSIS — Z98.61 S/P CORONARY ANGIOPLASTY: Primary | ICD-10-CM

## 2017-10-10 ENCOUNTER — TREATMENT (OUTPATIENT)
Dept: CARDIAC REHAB | Facility: HOSPITAL | Age: 77
End: 2017-10-10

## 2017-10-10 VITALS — DIASTOLIC BLOOD PRESSURE: 60 MMHG | SYSTOLIC BLOOD PRESSURE: 110 MMHG | HEART RATE: 68 BPM

## 2017-10-10 DIAGNOSIS — Z98.61 S/P CORONARY ANGIOPLASTY: Primary | ICD-10-CM

## 2017-10-11 ENCOUNTER — APPOINTMENT (OUTPATIENT)
Dept: CARDIAC REHAB | Facility: HOSPITAL | Age: 77
End: 2017-10-11

## 2017-10-13 ENCOUNTER — APPOINTMENT (OUTPATIENT)
Dept: CARDIAC REHAB | Facility: HOSPITAL | Age: 77
End: 2017-10-13

## 2017-10-16 ENCOUNTER — APPOINTMENT (OUTPATIENT)
Dept: CARDIAC REHAB | Facility: HOSPITAL | Age: 77
End: 2017-10-16

## 2017-10-18 ENCOUNTER — APPOINTMENT (OUTPATIENT)
Dept: CARDIAC REHAB | Facility: HOSPITAL | Age: 77
End: 2017-10-18

## 2017-10-20 ENCOUNTER — APPOINTMENT (OUTPATIENT)
Dept: CARDIAC REHAB | Facility: HOSPITAL | Age: 77
End: 2017-10-20

## 2017-10-23 ENCOUNTER — APPOINTMENT (OUTPATIENT)
Dept: CARDIAC REHAB | Facility: HOSPITAL | Age: 77
End: 2017-10-23

## 2017-10-25 ENCOUNTER — APPOINTMENT (OUTPATIENT)
Dept: CARDIAC REHAB | Facility: HOSPITAL | Age: 77
End: 2017-10-25

## 2017-10-26 ENCOUNTER — TELEPHONE (OUTPATIENT)
Dept: CARDIOLOGY | Facility: CLINIC | Age: 77
End: 2017-10-26

## 2017-10-26 NOTE — TELEPHONE ENCOUNTER
----- Message from Alex Blanco MD sent at 10/22/2017 10:12 PM EDT -----  Let her know that her labs were OK    sdf

## 2017-10-30 ENCOUNTER — TELEPHONE (OUTPATIENT)
Dept: CARDIOLOGY | Facility: CLINIC | Age: 77
End: 2017-10-30

## 2017-11-01 ENCOUNTER — TELEPHONE (OUTPATIENT)
Dept: CARDIOLOGY | Facility: CLINIC | Age: 77
End: 2017-11-01

## 2017-11-02 ENCOUNTER — APPOINTMENT (OUTPATIENT)
Dept: CARDIAC REHAB | Facility: HOSPITAL | Age: 77
End: 2017-11-02

## 2017-11-03 ENCOUNTER — TREATMENT (OUTPATIENT)
Dept: CARDIAC REHAB | Facility: HOSPITAL | Age: 77
End: 2017-11-03

## 2017-11-03 VITALS — OXYGEN SATURATION: 98 % | DIASTOLIC BLOOD PRESSURE: 70 MMHG | SYSTOLIC BLOOD PRESSURE: 120 MMHG | HEART RATE: 68 BPM

## 2017-11-03 DIAGNOSIS — Z98.61 S/P CORONARY ANGIOPLASTY: Primary | ICD-10-CM

## 2017-11-06 ENCOUNTER — TREATMENT (OUTPATIENT)
Dept: CARDIAC REHAB | Facility: HOSPITAL | Age: 77
End: 2017-11-06

## 2017-11-06 VITALS — OXYGEN SATURATION: 100 % | SYSTOLIC BLOOD PRESSURE: 122 MMHG | HEART RATE: 75 BPM | DIASTOLIC BLOOD PRESSURE: 78 MMHG

## 2017-11-06 DIAGNOSIS — Z98.61 S/P CORONARY ANGIOPLASTY: Primary | ICD-10-CM

## 2017-11-07 ENCOUNTER — TREATMENT (OUTPATIENT)
Dept: CARDIAC REHAB | Facility: HOSPITAL | Age: 77
End: 2017-11-07

## 2017-11-07 VITALS — HEART RATE: 68 BPM | DIASTOLIC BLOOD PRESSURE: 70 MMHG | OXYGEN SATURATION: 100 % | SYSTOLIC BLOOD PRESSURE: 140 MMHG

## 2017-11-07 DIAGNOSIS — Z98.61 S/P CORONARY ANGIOPLASTY: Primary | ICD-10-CM

## 2017-11-07 NOTE — PROGRESS NOTES
Phase III patient, see  Exercise flow sheet for documentation for exercise data documentation.  NAD note, skin w/p/d, denies CP, SOA, tolerated exercise well.     Patient educated on increasing levels on treadmill. She is going to alternate levels through out treadmill session.

## 2017-11-08 ENCOUNTER — APPOINTMENT (OUTPATIENT)
Dept: CARDIAC REHAB | Facility: HOSPITAL | Age: 77
End: 2017-11-08

## 2017-11-09 ENCOUNTER — TREATMENT (OUTPATIENT)
Dept: CARDIAC REHAB | Facility: HOSPITAL | Age: 77
End: 2017-11-09

## 2017-11-09 VITALS — HEART RATE: 78 BPM | DIASTOLIC BLOOD PRESSURE: 74 MMHG | SYSTOLIC BLOOD PRESSURE: 128 MMHG

## 2017-11-09 DIAGNOSIS — Z98.61 S/P CORONARY ANGIOPLASTY: Primary | ICD-10-CM

## 2017-11-10 ENCOUNTER — APPOINTMENT (OUTPATIENT)
Dept: CARDIAC REHAB | Facility: HOSPITAL | Age: 77
End: 2017-11-10

## 2017-11-13 ENCOUNTER — TREATMENT (OUTPATIENT)
Dept: CARDIAC REHAB | Facility: HOSPITAL | Age: 77
End: 2017-11-13

## 2017-11-13 VITALS — DIASTOLIC BLOOD PRESSURE: 84 MMHG | SYSTOLIC BLOOD PRESSURE: 136 MMHG | HEART RATE: 83 BPM

## 2017-11-13 DIAGNOSIS — Z98.61 S/P CORONARY ANGIOPLASTY: Primary | ICD-10-CM

## 2017-11-15 ENCOUNTER — TREATMENT (OUTPATIENT)
Dept: CARDIAC REHAB | Facility: HOSPITAL | Age: 77
End: 2017-11-15

## 2017-11-15 DIAGNOSIS — Z98.61 S/P CORONARY ANGIOPLASTY: Primary | ICD-10-CM

## 2017-11-17 ENCOUNTER — APPOINTMENT (OUTPATIENT)
Dept: CARDIAC REHAB | Facility: HOSPITAL | Age: 77
End: 2017-11-17

## 2017-11-20 ENCOUNTER — TREATMENT (OUTPATIENT)
Dept: CARDIAC REHAB | Facility: HOSPITAL | Age: 77
End: 2017-11-20

## 2017-11-20 VITALS — HEART RATE: 72 BPM | SYSTOLIC BLOOD PRESSURE: 140 MMHG | OXYGEN SATURATION: 100 % | DIASTOLIC BLOOD PRESSURE: 80 MMHG

## 2017-11-20 DIAGNOSIS — Z98.61 S/P CORONARY ANGIOPLASTY: Primary | ICD-10-CM

## 2017-11-22 ENCOUNTER — APPOINTMENT (OUTPATIENT)
Dept: CARDIAC REHAB | Facility: HOSPITAL | Age: 77
End: 2017-11-22

## 2017-11-27 ENCOUNTER — APPOINTMENT (OUTPATIENT)
Dept: CARDIAC REHAB | Facility: HOSPITAL | Age: 77
End: 2017-11-27

## 2017-11-29 ENCOUNTER — APPOINTMENT (OUTPATIENT)
Dept: CARDIAC REHAB | Facility: HOSPITAL | Age: 77
End: 2017-11-29

## 2017-11-30 ENCOUNTER — TREATMENT (OUTPATIENT)
Dept: CARDIAC REHAB | Facility: HOSPITAL | Age: 77
End: 2017-11-30

## 2017-11-30 VITALS — DIASTOLIC BLOOD PRESSURE: 78 MMHG | HEART RATE: 71 BPM | OXYGEN SATURATION: 98 % | SYSTOLIC BLOOD PRESSURE: 136 MMHG

## 2017-11-30 DIAGNOSIS — Z98.61 S/P CORONARY ANGIOPLASTY: Primary | ICD-10-CM

## 2017-12-01 ENCOUNTER — APPOINTMENT (OUTPATIENT)
Dept: CARDIAC REHAB | Facility: HOSPITAL | Age: 77
End: 2017-12-01

## 2017-12-04 ENCOUNTER — APPOINTMENT (OUTPATIENT)
Dept: CARDIAC REHAB | Facility: HOSPITAL | Age: 77
End: 2017-12-04

## 2017-12-06 ENCOUNTER — APPOINTMENT (OUTPATIENT)
Dept: CARDIAC REHAB | Facility: HOSPITAL | Age: 77
End: 2017-12-06

## 2017-12-08 ENCOUNTER — APPOINTMENT (OUTPATIENT)
Dept: CARDIAC REHAB | Facility: HOSPITAL | Age: 77
End: 2017-12-08

## 2017-12-11 ENCOUNTER — APPOINTMENT (OUTPATIENT)
Dept: CARDIAC REHAB | Facility: HOSPITAL | Age: 77
End: 2017-12-11

## 2017-12-13 ENCOUNTER — APPOINTMENT (OUTPATIENT)
Dept: CARDIAC REHAB | Facility: HOSPITAL | Age: 77
End: 2017-12-13

## 2017-12-15 ENCOUNTER — APPOINTMENT (OUTPATIENT)
Dept: CARDIAC REHAB | Facility: HOSPITAL | Age: 77
End: 2017-12-15

## 2017-12-18 ENCOUNTER — APPOINTMENT (OUTPATIENT)
Dept: CARDIAC REHAB | Facility: HOSPITAL | Age: 77
End: 2017-12-18

## 2017-12-20 ENCOUNTER — APPOINTMENT (OUTPATIENT)
Dept: CARDIAC REHAB | Facility: HOSPITAL | Age: 77
End: 2017-12-20

## 2017-12-20 RX ORDER — RANOLAZINE 500 MG/1
TABLET, FILM COATED, EXTENDED RELEASE ORAL
Qty: 180 TABLET | Refills: 11 | Status: SHIPPED | OUTPATIENT
Start: 2017-12-20 | End: 2022-01-01 | Stop reason: HOSPADM

## 2017-12-22 ENCOUNTER — APPOINTMENT (OUTPATIENT)
Dept: CARDIAC REHAB | Facility: HOSPITAL | Age: 77
End: 2017-12-22

## 2017-12-27 ENCOUNTER — APPOINTMENT (OUTPATIENT)
Dept: CARDIAC REHAB | Facility: HOSPITAL | Age: 77
End: 2017-12-27

## 2017-12-29 ENCOUNTER — APPOINTMENT (OUTPATIENT)
Dept: CARDIAC REHAB | Facility: HOSPITAL | Age: 77
End: 2017-12-29

## 2018-01-05 ENCOUNTER — APPOINTMENT (OUTPATIENT)
Dept: CARDIAC REHAB | Facility: HOSPITAL | Age: 78
End: 2018-01-05

## 2018-01-08 ENCOUNTER — APPOINTMENT (OUTPATIENT)
Dept: CARDIAC REHAB | Facility: HOSPITAL | Age: 78
End: 2018-01-08

## 2018-01-10 ENCOUNTER — APPOINTMENT (OUTPATIENT)
Dept: CARDIAC REHAB | Facility: HOSPITAL | Age: 78
End: 2018-01-10

## 2018-01-12 ENCOUNTER — APPOINTMENT (OUTPATIENT)
Dept: CARDIAC REHAB | Facility: HOSPITAL | Age: 78
End: 2018-01-12

## 2018-01-15 ENCOUNTER — APPOINTMENT (OUTPATIENT)
Dept: CARDIAC REHAB | Facility: HOSPITAL | Age: 78
End: 2018-01-15

## 2018-01-17 ENCOUNTER — APPOINTMENT (OUTPATIENT)
Dept: CARDIAC REHAB | Facility: HOSPITAL | Age: 78
End: 2018-01-17

## 2018-01-19 ENCOUNTER — APPOINTMENT (OUTPATIENT)
Dept: CARDIAC REHAB | Facility: HOSPITAL | Age: 78
End: 2018-01-19

## 2018-01-22 ENCOUNTER — APPOINTMENT (OUTPATIENT)
Dept: CARDIAC REHAB | Facility: HOSPITAL | Age: 78
End: 2018-01-22

## 2018-01-24 ENCOUNTER — APPOINTMENT (OUTPATIENT)
Dept: CARDIAC REHAB | Facility: HOSPITAL | Age: 78
End: 2018-01-24

## 2018-02-02 ENCOUNTER — APPOINTMENT (OUTPATIENT)
Dept: CARDIAC REHAB | Facility: HOSPITAL | Age: 78
End: 2018-02-02

## 2018-02-05 ENCOUNTER — APPOINTMENT (OUTPATIENT)
Dept: CARDIAC REHAB | Facility: HOSPITAL | Age: 78
End: 2018-02-05

## 2018-02-07 ENCOUNTER — APPOINTMENT (OUTPATIENT)
Dept: CARDIAC REHAB | Facility: HOSPITAL | Age: 78
End: 2018-02-07

## 2018-02-09 ENCOUNTER — APPOINTMENT (OUTPATIENT)
Dept: CARDIAC REHAB | Facility: HOSPITAL | Age: 78
End: 2018-02-09

## 2018-02-12 ENCOUNTER — APPOINTMENT (OUTPATIENT)
Dept: CARDIAC REHAB | Facility: HOSPITAL | Age: 78
End: 2018-02-12

## 2018-02-14 ENCOUNTER — APPOINTMENT (OUTPATIENT)
Dept: CARDIAC REHAB | Facility: HOSPITAL | Age: 78
End: 2018-02-14

## 2018-02-16 ENCOUNTER — APPOINTMENT (OUTPATIENT)
Dept: CARDIAC REHAB | Facility: HOSPITAL | Age: 78
End: 2018-02-16

## 2018-02-19 ENCOUNTER — APPOINTMENT (OUTPATIENT)
Dept: CARDIAC REHAB | Facility: HOSPITAL | Age: 78
End: 2018-02-19

## 2018-02-21 ENCOUNTER — APPOINTMENT (OUTPATIENT)
Dept: CARDIAC REHAB | Facility: HOSPITAL | Age: 78
End: 2018-02-21

## 2018-02-23 ENCOUNTER — APPOINTMENT (OUTPATIENT)
Dept: CARDIAC REHAB | Facility: HOSPITAL | Age: 78
End: 2018-02-23

## 2018-02-26 ENCOUNTER — APPOINTMENT (OUTPATIENT)
Dept: CARDIAC REHAB | Facility: HOSPITAL | Age: 78
End: 2018-02-26

## 2018-02-28 ENCOUNTER — APPOINTMENT (OUTPATIENT)
Dept: CARDIAC REHAB | Facility: HOSPITAL | Age: 78
End: 2018-02-28

## 2018-03-02 ENCOUNTER — APPOINTMENT (OUTPATIENT)
Dept: CARDIAC REHAB | Facility: HOSPITAL | Age: 78
End: 2018-03-02

## 2018-03-05 ENCOUNTER — APPOINTMENT (OUTPATIENT)
Dept: CARDIAC REHAB | Facility: HOSPITAL | Age: 78
End: 2018-03-05

## 2018-03-07 ENCOUNTER — APPOINTMENT (OUTPATIENT)
Dept: CARDIAC REHAB | Facility: HOSPITAL | Age: 78
End: 2018-03-07

## 2018-03-09 ENCOUNTER — APPOINTMENT (OUTPATIENT)
Dept: CARDIAC REHAB | Facility: HOSPITAL | Age: 78
End: 2018-03-09

## 2018-03-12 ENCOUNTER — APPOINTMENT (OUTPATIENT)
Dept: CARDIAC REHAB | Facility: HOSPITAL | Age: 78
End: 2018-03-12

## 2018-03-14 ENCOUNTER — APPOINTMENT (OUTPATIENT)
Dept: CARDIAC REHAB | Facility: HOSPITAL | Age: 78
End: 2018-03-14

## 2018-03-16 ENCOUNTER — APPOINTMENT (OUTPATIENT)
Dept: CARDIAC REHAB | Facility: HOSPITAL | Age: 78
End: 2018-03-16

## 2018-03-19 ENCOUNTER — APPOINTMENT (OUTPATIENT)
Dept: CARDIAC REHAB | Facility: HOSPITAL | Age: 78
End: 2018-03-19

## 2018-03-20 ENCOUNTER — OFFICE VISIT (OUTPATIENT)
Dept: CARDIOLOGY | Facility: CLINIC | Age: 78
End: 2018-03-20

## 2018-03-20 ENCOUNTER — HOSPITAL ENCOUNTER (EMERGENCY)
Facility: HOSPITAL | Age: 78
Discharge: HOME | End: 2018-03-20
Attending: EMERGENCY MEDICINE | Admitting: EMERGENCY MEDICINE

## 2018-03-20 ENCOUNTER — APPOINTMENT (OUTPATIENT)
Dept: GENERAL RADIOLOGY | Facility: HOSPITAL | Age: 78
End: 2018-03-20

## 2018-03-20 VITALS
SYSTOLIC BLOOD PRESSURE: 175 MMHG | RESPIRATION RATE: 18 BRPM | HEART RATE: 57 BPM | OXYGEN SATURATION: 99 % | HEIGHT: 65 IN | DIASTOLIC BLOOD PRESSURE: 97 MMHG | TEMPERATURE: 98.3 F | BODY MASS INDEX: 27.49 KG/M2 | WEIGHT: 165 LBS

## 2018-03-20 VITALS
OXYGEN SATURATION: 96 % | HEIGHT: 65 IN | BODY MASS INDEX: 27.59 KG/M2 | SYSTOLIC BLOOD PRESSURE: 131 MMHG | WEIGHT: 165.6 LBS | HEART RATE: 67 BPM | DIASTOLIC BLOOD PRESSURE: 75 MMHG

## 2018-03-20 DIAGNOSIS — I10 ESSENTIAL HYPERTENSION: ICD-10-CM

## 2018-03-20 DIAGNOSIS — E78.2 MIXED HYPERLIPIDEMIA: ICD-10-CM

## 2018-03-20 DIAGNOSIS — R07.9 CHEST PAIN, MODERATE CORONARY ARTERY RISK: Primary | ICD-10-CM

## 2018-03-20 DIAGNOSIS — I25.10 CORONARY ARTERY DISEASE INVOLVING NATIVE HEART WITHOUT ANGINA PECTORIS, UNSPECIFIED VESSEL OR LESION TYPE: ICD-10-CM

## 2018-03-20 DIAGNOSIS — I25.10 ASHD (ARTERIOSCLEROTIC HEART DISEASE): Primary | ICD-10-CM

## 2018-03-20 LAB
ALBUMIN SERPL-MCNC: 3.8 G/DL (ref 3.4–4.8)
ALBUMIN/GLOB SERPL: 1.2 G/DL (ref 1.5–2.5)
ALP SERPL-CCNC: 83 U/L (ref 35–104)
ALT SERPL W P-5'-P-CCNC: 19 U/L (ref 10–36)
ANION GAP SERPL CALCULATED.3IONS-SCNC: 6.6 MMOL/L (ref 3.6–11.2)
AST SERPL-CCNC: 25 U/L (ref 10–30)
BASOPHILS # BLD AUTO: 0.01 10*3/MM3 (ref 0–0.3)
BASOPHILS NFR BLD AUTO: 0.2 % (ref 0–2)
BILIRUB SERPL-MCNC: 0.7 MG/DL (ref 0.2–1.8)
BUN BLD-MCNC: 18 MG/DL (ref 7–21)
BUN/CREAT SERPL: 17.5 (ref 7–25)
CALCIUM SPEC-SCNC: 9.2 MG/DL (ref 7.7–10)
CHLORIDE SERPL-SCNC: 107 MMOL/L (ref 99–112)
CK MB SERPL-CCNC: 0.71 NG/ML (ref 0–5)
CO2 SERPL-SCNC: 22.4 MMOL/L (ref 24.3–31.9)
CREAT BLD-MCNC: 1.03 MG/DL (ref 0.43–1.29)
DEPRECATED RDW RBC AUTO: 47.4 FL (ref 37–54)
EOSINOPHIL # BLD AUTO: 0 10*3/MM3 (ref 0–0.7)
EOSINOPHIL NFR BLD AUTO: 0 % (ref 0–7)
ERYTHROCYTE [DISTWIDTH] IN BLOOD BY AUTOMATED COUNT: 13.9 % (ref 11.5–14.5)
GFR SERPL CREATININE-BSD FRML MDRD: 52 ML/MIN/1.73
GLOBULIN UR ELPH-MCNC: 3.2 GM/DL
GLUCOSE BLD-MCNC: 97 MG/DL (ref 70–110)
HCT VFR BLD AUTO: 43.1 % (ref 37–47)
HGB BLD-MCNC: 14.2 G/DL (ref 12–16)
HOLD SPECIMEN: NORMAL
HOLD SPECIMEN: NORMAL
IMM GRANULOCYTES # BLD: 0.01 10*3/MM3 (ref 0–0.03)
IMM GRANULOCYTES NFR BLD: 0.2 % (ref 0–0.5)
LYMPHOCYTES # BLD AUTO: 1.6 10*3/MM3 (ref 1–3)
LYMPHOCYTES NFR BLD AUTO: 25.9 % (ref 16–46)
MCH RBC QN AUTO: 30.9 PG (ref 27–33)
MCHC RBC AUTO-ENTMCNC: 32.9 G/DL (ref 33–37)
MCV RBC AUTO: 93.7 FL (ref 80–94)
MONOCYTES # BLD AUTO: 0.7 10*3/MM3 (ref 0.1–0.9)
MONOCYTES NFR BLD AUTO: 11.3 % (ref 0–12)
NEUTROPHILS # BLD AUTO: 3.85 10*3/MM3 (ref 1.4–6.5)
NEUTROPHILS NFR BLD AUTO: 62.4 % (ref 40–75)
OSMOLALITY SERPL CALC.SUM OF ELEC: 273.8 MOSM/KG (ref 273–305)
PLATELET # BLD AUTO: 153 10*3/MM3 (ref 130–400)
PMV BLD AUTO: 10 FL (ref 6–10)
POTASSIUM BLD-SCNC: 4.4 MMOL/L (ref 3.5–5.3)
PROT SERPL-MCNC: 7 G/DL (ref 6–8)
RBC # BLD AUTO: 4.6 10*6/MM3 (ref 4.2–5.4)
SODIUM BLD-SCNC: 136 MMOL/L (ref 135–153)
TROPONIN I SERPL-MCNC: 0.01 NG/ML
TROPONIN I SERPL-MCNC: <0.006 NG/ML
WBC NRBC COR # BLD: 6.17 10*3/MM3 (ref 4.5–12.5)
WHOLE BLOOD HOLD SPECIMEN: NORMAL
WHOLE BLOOD HOLD SPECIMEN: NORMAL

## 2018-03-20 PROCEDURE — 71046 X-RAY EXAM CHEST 2 VIEWS: CPT | Performed by: RADIOLOGY

## 2018-03-20 PROCEDURE — 84484 ASSAY OF TROPONIN QUANT: CPT | Performed by: EMERGENCY MEDICINE

## 2018-03-20 PROCEDURE — 71046 X-RAY EXAM CHEST 2 VIEWS: CPT

## 2018-03-20 PROCEDURE — 93005 ELECTROCARDIOGRAM TRACING: CPT | Performed by: EMERGENCY MEDICINE

## 2018-03-20 PROCEDURE — 36415 COLL VENOUS BLD VENIPUNCTURE: CPT

## 2018-03-20 PROCEDURE — 93010 ELECTROCARDIOGRAM REPORT: CPT | Performed by: INTERNAL MEDICINE

## 2018-03-20 PROCEDURE — 99285 EMERGENCY DEPT VISIT HI MDM: CPT

## 2018-03-20 PROCEDURE — 85025 COMPLETE CBC W/AUTO DIFF WBC: CPT | Performed by: EMERGENCY MEDICINE

## 2018-03-20 PROCEDURE — 80053 COMPREHEN METABOLIC PANEL: CPT | Performed by: EMERGENCY MEDICINE

## 2018-03-20 PROCEDURE — 82553 CREATINE MB FRACTION: CPT | Performed by: EMERGENCY MEDICINE

## 2018-03-20 RX ORDER — CLONIDINE HYDROCHLORIDE 0.1 MG/1
0.1 TABLET ORAL ONCE
Status: COMPLETED | OUTPATIENT
Start: 2018-03-20 | End: 2018-03-20

## 2018-03-20 RX ORDER — ASPIRIN 325 MG
325 TABLET ORAL DAILY
COMMUNITY
End: 2022-01-01 | Stop reason: HOSPADM

## 2018-03-20 RX ORDER — ASPIRIN 325 MG
325 TABLET ORAL ONCE
Status: COMPLETED | OUTPATIENT
Start: 2018-03-20 | End: 2018-03-20

## 2018-03-20 RX ORDER — RANITIDINE 150 MG/1
150 TABLET ORAL 2 TIMES DAILY
COMMUNITY
End: 2022-01-01 | Stop reason: HOSPADM

## 2018-03-20 RX ADMIN — CLONIDINE HYDROCHLORIDE 0.1 MG: 0.1 TABLET ORAL at 19:45

## 2018-03-20 RX ADMIN — ASPIRIN 325 MG: 325 TABLET ORAL at 15:43

## 2018-03-20 NOTE — ED NOTES
Pt reassessed at this time, pt denies no changes while she has been waiting in er lobby due to high pt volume at this time, pt states her chest discomfort is better than it was when she first arrived and currently rates discomfort as a 1 on 1-10 pain scale, instr.pt to notify staff of any changes while awaiting and she verb. understanding     Cindy Mckeon RN  03/20/18 4710

## 2018-03-20 NOTE — ED PROVIDER NOTES
Subjective   78-year-old white female complains of chest pain.  Patient states she woke this morning and had a sharp pain in the left scapular area which radiated through to her chest.  This seemed to improve but then later had some discomfort in her anterior chest.  She cannot have any shortness of breath, vomiting, diaphoresis.  She has a history of coronary artery disease and did have stent placement previously.  She denied any cough, fever, chills, abdominal pain or other complaints.  She states that she is feeling better at this time and thought about going home before being seen.            Review of Systems   All other systems reviewed and are negative.      Past Medical History:   Diagnosis Date   • Arthritis    • Coronary artery disease    • GERD (gastroesophageal reflux disease)    • Hyperlipidemia    • Hypertension        Allergies   Allergen Reactions   • Clindamycin/Lincomycin GI Intolerance   • Penicillins Rash   • Sulfa Antibiotics Rash       Past Surgical History:   Procedure Laterality Date   • APPENDECTOMY     • CARDIAC CATHETERIZATION     • CARDIAC CATHETERIZATION N/A 3/22/2017    Procedure: Left Heart Cath;  Surgeon: Alex Blanco MD;  Location: [facility] CATH INVASIVE LOCATION;  Service:    • CARDIAC SURGERY     • CHOLECYSTECTOMY     • CORONARY ARTERY BYPASS GRAFT     • CORONARY STENT PLACEMENT     • HERNIA REPAIR     • HYSTERECTOMY         Family History   Problem Relation Age of Onset   • Heart attack Mother    • Anuerysm Father    • Heart attack Sister    • Heart disease Sister    • Heart disease Brother        Social History     Social History   • Marital status:      Social History Main Topics   • Smoking status: Never Smoker   • Alcohol use No   • Drug use: No   • Sexual activity: Defer     Other Topics Concern   • Not on file           Objective   Physical Exam   Constitutional: She is oriented to person, place, and time. She appears well-developed and well-nourished.   HENT:    Head: Normocephalic and atraumatic.   Cardiovascular: Normal rate, regular rhythm and normal heart sounds.  Exam reveals no gallop and no friction rub.    No murmur heard.  Pulses:       Dorsalis pedis pulses are 3+ on the right side, and 3+ on the left side.   Pulmonary/Chest: Effort normal and breath sounds normal. No respiratory distress. She has no wheezes. She has no rales.   Abdominal: Soft. Bowel sounds are normal. She exhibits no distension. There is no tenderness.   Musculoskeletal: Normal range of motion. She exhibits no edema.   Neurological: She is alert and oriented to person, place, and time.   Skin: Skin is warm and dry.   Several ecchymoses noted on the upper extremities bilaterally consistent with her history of Plavix use.   Psychiatric: She has a normal mood and affect.   Nursing note and vitals reviewed.      Procedures  Results for orders placed or performed during the hospital encounter of 03/20/18   Comprehensive Metabolic Panel   Result Value Ref Range    Glucose 97 70 - 110 mg/dL    BUN 18 7 - 21 mg/dL    Creatinine 1.03 0.43 - 1.29 mg/dL    Sodium 136 135 - 153 mmol/L    Potassium 4.4 3.5 - 5.3 mmol/L    Chloride 107 99 - 112 mmol/L    CO2 22.4 (L) 24.3 - 31.9 mmol/L    Calcium 9.2 7.7 - 10.0 mg/dL    Total Protein 7.0 6.0 - 8.0 g/dL    Albumin 3.80 3.40 - 4.80 g/dL    ALT (SGPT) 19 10 - 36 U/L    AST (SGOT) 25 10 - 30 U/L    Alkaline Phosphatase 83 35 - 104 U/L    Total Bilirubin 0.7 0.2 - 1.8 mg/dL    eGFR Non African Amer 52 (L) >60 mL/min/1.73    Globulin 3.2 gm/dL    A/G Ratio 1.2 (L) 1.5 - 2.5 g/dL    BUN/Creatinine Ratio 17.5 7.0 - 25.0    Anion Gap 6.6 3.6 - 11.2 mmol/L   Troponin   Result Value Ref Range    Troponin I <0.006 <=0.040 ng/mL   Troponin   Result Value Ref Range    Troponin I 0.011 <=0.040 ng/mL   CK-MB   Result Value Ref Range    CKMB 0.71 0.00 - 5.00 ng/mL   CBC Auto Differential   Result Value Ref Range    WBC 6.17 4.50 - 12.50 10*3/mm3    RBC 4.60 4.20 - 5.40  10*6/mm3    Hemoglobin 14.2 12.0 - 16.0 g/dL    Hematocrit 43.1 37.0 - 47.0 %    MCV 93.7 80.0 - 94.0 fL    MCH 30.9 27.0 - 33.0 pg    MCHC 32.9 (L) 33.0 - 37.0 g/dL    RDW 13.9 11.5 - 14.5 %    RDW-SD 47.4 37.0 - 54.0 fl    MPV 10.0 6.0 - 10.0 fL    Platelets 153 130 - 400 10*3/mm3    Neutrophil % 62.4 40.0 - 75.0 %    Lymphocyte % 25.9 16.0 - 46.0 %    Monocyte % 11.3 0.0 - 12.0 %    Eosinophil % 0.0 0.0 - 7.0 %    Basophil % 0.2 0.0 - 2.0 %    Immature Grans % 0.2 0.0 - 0.5 %    Neutrophils, Absolute 3.85 1.40 - 6.50 10*3/mm3    Lymphocytes, Absolute 1.60 1.00 - 3.00 10*3/mm3    Monocytes, Absolute 0.70 0.10 - 0.90 10*3/mm3    Eosinophils, Absolute 0.00 0.00 - 0.70 10*3/mm3    Basophils, Absolute 0.01 0.00 - 0.30 10*3/mm3    Immature Grans, Absolute 0.01 0.00 - 0.03 10*3/mm3   Osmolality, Calculated   Result Value Ref Range    Osmolality Calc 273.8 273.0 - 305.0 mOsm/kg   Light Blue Top   Result Value Ref Range    Extra Tube hold for add-on    Green Top (Gel)   Result Value Ref Range    Extra Tube Hold for add-ons.    Lavender Top   Result Value Ref Range    Extra Tube hold for add-on    Gold Top - SST   Result Value Ref Range    Extra Tube Hold for add-ons.      Xr Chest 2 View    Result Date: 3/20/2018  Narrative: EXAMINATION:  XR CHEST 2 VW-  CLINICAL INDICATION:     Chest Pain triage protocol  TECHNIQUE:  XR CHEST 2 VW-   COMPARISON: NONE  FINDINGS: The lungs remain aerated. Heart size is stable. No pneumothorax. No pleural effusion. Bony and soft tissue structures are unremarkable. Descending aortic tortuosity.      Impression: Descending aortic tortuosity.  This report was finalized on 3/20/2018 4:03 PM by Dr. Brian Fair MD.             ED Course  ED Course   Value Comment By Time    I have discussed results of workup with patient.  Currently she is asymptomatic.  Cardiac enzymes within normal limits.  Atypical symptoms which is different from her previous angina.  She states she has appointment to see  cardiologist in one week.  She prefers discharge home for outpatient follow-up.  She understands return if she should have any worsening symptoms. Jason Shen MD 03/20 1913   ECG 12 Lead Normal sinus rhythm.  Rate 66.  Right bundle branch block.  No ST elevations or depressions.  T-wave inversions in lead V1 through V5.  This is unchanged from March 2017. Jason Shen MD 03/20 1918                HEART Score (for prediction of 6-week risk of major adverse cardiac event) reviewed and/or performed as part of the patient evaluation and treatment planning process.  The result associated with this review/performance is: 4       MDM  Number of Diagnoses or Management Options  Chest pain, moderate coronary artery risk:      Amount and/or Complexity of Data Reviewed  Clinical lab tests: reviewed  Tests in the radiology section of CPT®: reviewed  Tests in the medicine section of CPT®: reviewed  Decide to obtain previous medical records or to obtain history from someone other than the patient: yes  Independent visualization of images, tracings, or specimens: yes    Risk of Complications, Morbidity, and/or Mortality  Presenting problems: high  Diagnostic procedures: moderate  Management options: moderate        Final diagnoses:   Chest pain, moderate coronary artery risk   Coronary artery disease involving native heart without angina pectoris, unspecified vessel or lesion type            Jason Shen MD  03/20/18 1924

## 2018-03-20 NOTE — PROGRESS NOTES
Subjective     Ms Batista arrived for her appointment today and described chest pain.  Apparently she woke early this morning with pain in her left shoulder, after about 3 minutes the pain radiated anteriorly and to the jaw area.  She does not recall being short of breath.  She does remember being nauseated.  Throughout the day she has had stuttering substernal chest pain.      Patient advised that she should go to the ED.  Hospital transport team notified.  ED triage nurse, Minda ALBERTS, was called and report was given.  Patient transferred to ED via wheelchair.      Chief Complaint: ASHD; Hypertension; and Hyperlipidemia    History of Present Illness   Eloina Batista is a 78 y.o. female who presents for follow-up of known coronary artery disease.  She initially underwent bypass surgery and multiple stent implantations since that time.  Her last stent was implanted in 2006.  She presented in March 2017 with relatively typical unstable angina.  Cardiac catheterization at that time demonstrated a right dominant coronary artery system with native single vessel disease involving the LAD and diagonal branch.  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  She underwent cutting balloon angioplasty to this vessel which was uncomplicated.  She currently states that she is doing much better.  She is participating in cardiac rehabilitation and notes that with activity she is no longer having any angina at all.  She denies any congestive heart failure symptoms.  She denies any palpitations, near syncope or syncopal symptoms.  She has been compliant with medical therapy.        Current Outpatient Prescriptions:   •  amLODIPine (NORVASC) 5 MG tablet, Take 5 mg by mouth daily., Disp: , Rfl:   •  aspirin 325 MG tablet, Take 325 mg by mouth Daily., Disp: , Rfl:   •  atorvastatin (LIPITOR) 80 MG tablet, Take 1 tablet by mouth Daily. For  cholesterol, Disp: 30 tablet, Rfl: 5  •  calcium carbonate (OS-SAM) 600 MG tablet, Take 600 mg by mouth Daily., Disp: , Rfl:   •  cetirizine (zyrTEC) 10 MG tablet, Take 10 mg by mouth Daily., Disp: , Rfl:   •  clopidogrel (PLAVIX) 75 MG tablet, Take 75 mg by mouth daily., Disp: , Rfl:   •  dicyclomine (BENTYL) 20 MG tablet, Take 20 mg by mouth 2 (Two) Times a Day. Noon and bedtime, Disp: , Rfl:   •  Isosorbide Mononitrate (IMDUR PO), Take 20 mg by mouth Daily., Disp: , Rfl:   •  metoprolol succinate XL (TOPROL XL) 50 MG 24 hr tablet, Take 50 mg by mouth 2 (Two) Times a Day., Disp: , Rfl:   •  minocycline (MINOCIN,DYNACIN) 100 MG capsule, Take 100 mg by mouth Daily., Disp: , Rfl:   •  nitroglycerin (NITROSTAT) 0.4 MG SL tablet, Place 1 tablet under the tongue Every 5 (Five) Minutes As Needed for Chest Pain. Take no more than 3 doses in 15 minutes., Disp: 25 tablet, Rfl: 12  •  polyethylene glycol (MIRALAX) packet, Take 17 g by mouth Daily As Needed., Disp: , Rfl:   •  raNITIdine (ZANTAC) 150 MG tablet, Take 150 mg by mouth 2 (Two) Times a Day., Disp: , Rfl:   •  ranolazine (RANEXA) 1000 MG 12 hr tablet, Take 1,000 mg by mouth Daily., Disp: , Rfl:   •  rosuvastatin (CRESTOR) 10 MG tablet, Take 10 mg by mouth Daily., Disp: , Rfl:   •  traMADol (ULTRAM) 50 MG tablet, Take 50 mg by mouth 2 (Two) Times a Day., Disp: , Rfl:   •  valsartan (DIOVAN) 320 MG tablet, Take 320 mg by mouth Daily., Disp: , Rfl:   •  vitamin E 400 UNIT capsule, Take 400 Units by mouth 2 (Two) Times a Day., Disp: , Rfl:   •  aspirin 81 MG tablet, Take 1 tablet by mouth Daily., Disp: 30 tablet, Rfl: 11  •  estrogens, conjugated, (PREMARIN) 0.3 MG tablet, Take 0.3 mg by mouth Daily., Disp: , Rfl:   •  gabapentin (NEURONTIN) 100 MG capsule, Take 100 mg by mouth 3 (Three) Times a Day., Disp: , Rfl:   •  Multiple Vitamins-Minerals (I-MEME) tablet tablet, Take 1 tablet by mouth Daily., Disp: , Rfl:   •  Multiple Vitamins-Minerals (MACULAR VITAMIN BENEFIT)  "tablet, Take 1 tablet by mouth Daily., Disp: , Rfl:   •  pantoprazole (PROTONIX) 40 MG EC tablet, Take 40 mg by mouth daily., Disp: , Rfl:   •  RANEXA 500 MG 12 hr tablet, TAKE 1 TABLET TWICE DAILY, Disp: 180 tablet, Rfl: 11     The following portions of the patient's history were reviewed and updated as appropriate: allergies, current medications, past family history, past medical history, past social history, past surgical history and problem list.    Review of Systems   Constitutional: Negative for activity change, appetite change and fatigue.   HENT: Negative for congestion and tinnitus.    Eyes: Negative for visual disturbance.   Respiratory: Positive for chest tightness. Negative for cough and shortness of breath.    Cardiovascular: Positive for chest pain. Negative for palpitations and leg swelling.   Gastrointestinal: Negative for blood in stool, nausea and vomiting.   Endocrine: Negative for cold intolerance, heat intolerance and polyuria.   Genitourinary: Negative for dysuria.   Musculoskeletal: Negative for myalgias and neck pain.   Skin: Negative for rash.   Neurological: Negative for dizziness, syncope, weakness and light-headedness.   Hematological: Bruises/bleeds easily.   Psychiatric/Behavioral: Negative for confusion and sleep disturbance.       Objective     /75 (BP Location: Right arm)   Pulse 67   Ht 165.1 cm (65\")   Wt 75.1 kg (165 lb 9.6 oz)   SpO2 96%   BMI 27.56 kg/m²     Physical Exam    Procedures    3/22/2017 cardiac catheterization    Conclusion        · Right dominant coronary artery system with native single vessel disease involving the LAD and diagonal branch  · Patient's internal mammary artery graft to the LAD  · Moderate to severe in-stent restenosis of the vein graft to the diagonal branch  · Successful cutting balloon angioplasty of the vein graft to the diagonal branch     Final impression:  Right dominant coronary artery system with native single vessel disease " involving the LAD  Patent stent in the circumflex artery  Patent stent in the right coronary artery  Moderate to severe in-stent restenosis of the vein graft to the diagonal branch  Patent internal mammary artery graft to the LAD  Successful cutting balloon angioplasty of the vein graft to the diagonal branch     Procedures performed:  Coronary angiography  Graft angiography  Angioplasty of the vein graft to the diagonal branch         Assessment/Plan       Eloina was seen today for ashd, hypertension and hyperlipidemia.    Diagnoses and all orders for this visit:                     Mallika Garsia APRN

## 2018-03-21 ENCOUNTER — APPOINTMENT (OUTPATIENT)
Dept: CARDIAC REHAB | Facility: HOSPITAL | Age: 78
End: 2018-03-21

## 2018-03-23 ENCOUNTER — APPOINTMENT (OUTPATIENT)
Dept: CARDIAC REHAB | Facility: HOSPITAL | Age: 78
End: 2018-03-23

## 2018-03-26 ENCOUNTER — APPOINTMENT (OUTPATIENT)
Dept: CARDIAC REHAB | Facility: HOSPITAL | Age: 78
End: 2018-03-26

## 2019-07-12 DIAGNOSIS — I25.810 CORONARY ARTERY DISEASE INVOLVING AUTOLOGOUS ARTERY CORONARY BYPASS GRAFT WITHOUT ANGINA PECTORIS: Primary | ICD-10-CM

## 2019-08-01 ENCOUNTER — APPOINTMENT (OUTPATIENT)
Dept: CARDIAC REHAB | Facility: HOSPITAL | Age: 79
End: 2019-08-01

## 2019-08-06 ENCOUNTER — APPOINTMENT (OUTPATIENT)
Dept: CARDIAC REHAB | Facility: HOSPITAL | Age: 79
End: 2019-08-06

## 2019-08-07 ENCOUNTER — TREATMENT (OUTPATIENT)
Dept: CARDIAC REHAB | Facility: HOSPITAL | Age: 79
End: 2019-08-07

## 2019-08-07 VITALS
RESPIRATION RATE: 16 BRPM | HEIGHT: 65 IN | DIASTOLIC BLOOD PRESSURE: 70 MMHG | WEIGHT: 170 LBS | OXYGEN SATURATION: 98 % | BODY MASS INDEX: 28.32 KG/M2 | HEART RATE: 63 BPM | SYSTOLIC BLOOD PRESSURE: 112 MMHG

## 2019-08-07 DIAGNOSIS — Z98.61 S/P CORONARY ANGIOPLASTY: ICD-10-CM

## 2019-08-07 DIAGNOSIS — I25.810 CORONARY ARTERY DISEASE INVOLVING AUTOLOGOUS ARTERY CORONARY BYPASS GRAFT WITHOUT ANGINA PECTORIS: Primary | ICD-10-CM

## 2019-08-07 NOTE — PROGRESS NOTES
Cardiac Rehab Initial Assessment      Name: Eloina Batista  :1940 Allergies:Clindamycin/lincomycin; Penicillins; and Sulfa antibiotics   MRN: [id number] 79 y.o. Physician: Alex Og MD   Primary Diagnosis:    Diagnosis Plan   1. Coronary artery disease involving autologous artery coronary bypass graft without angina pectoris     2. S/P coronary angioplasty      Event Date:19 Specialist: Dragan Gutierrez   Secondary Diagnosis: na Risk Stratification:Moderate Risk Note Author: Katharine Magdaleno RN     Cardiovascular History: Previous MI, Previous PCI and Previous CABS     EXERCISE AT HOME  no    N/A    EF: NA%      Source: NA          Ambulatory Status:Independent  Ambulatory Fall Risk Assessed on Initial Visit: yes 6 Minute Walk Pre- Cardiac Rehab:  Distance:1440ft      RPE:12  Max. HR: 79       SPO2:97    MET: 3.1    Resting BP:118/70 LA, 112/70 RA    Peak BP: 120/76  Recovery BP: 112/68  Comments: Patient tolerated 6MWT well. No distress noted no complaints voiced, denied Cp pressure or SOA. She is to be a Phase III patient at this time. Which is non monitored. Self pay.       NUTRITION  Lipids:n/a If yes, labs as follows;  Total: No components found for: CHOLESTEROL  HDL:   HDL Cholesterol   Date Value Ref Range Status   2017 34 (L) 60 - 100 mg/dL Final    Lipids continued:  LDL:  LDL Cholesterol    Date Value Ref Range Status   2017 55 0 - 100 mg/dL Final     Triglyceride: No components found for: TRIGLYCERIDE   Weight Management:                 Weight: 170 lb  Height: 65 in                                   BMI: Body mass index is 28.29 kg/m².  Waist Circumference: na  inches   Alcohol Use: none Diabetes:No    Last HGBA1C with date if applicable:No components found for: A1C         SOCIAL HISTORY  Social History     Socioeconomic History   • Marital status:      Spouse name: Not on file   • Number of children: Not on file   • Years of education: Not on file   • Highest education  level: Not on file   Tobacco Use   • Smoking status: Never Smoker   Substance and Sexual Activity   • Alcohol use: No   • Drug use: No   • Sexual activity: Defer       Educational Level (choose one that applies) some college Learning Barriers:Ready to Learn    Family Support:yes    Living Arrangement: lives with their spouse    Risk Factors: Stress  Yes, Clinical Depression  No, Heredity  Yes If Yes father  and Hyperlipidemia  Yes     Tobacco Adjunct: N/A        Comorbidities: Previous MI     PSYCHOSOCIAL  Clinical Depression: no    Stress: yes     Assess presence or absence of depression using a valid screening tool: yes    Scored a 1 on her PHQ screening for depression      PHYSICAL ASSESSMENT  Influenza vaccine: yes  Pneumococcal vaccine: yes          Angina: no    Describe angina scale of 0 - 4: 0 = none    Today are you having incisional pain? N/A. If, Yes, Scale: na        Today are you having any other pain? No. If, Yes, Scale: na     Diagnosed with Hypertension:yes    Heart Sounds: S1 S2 no rubs or murmurs noted     Lung Sounds: normal air entry, lungs clear to auscultation         Assessment: Pt tolerated 6MWT well, NAD noted, no complaints voiced,  skin warm, pink and dry, resp within normal limits and even, denies chest discomfort, chest pressure ,SOA .  Monitor shows NSR , V/S WDL ,see [facility] documentation for exercise data.  Dr. Robert physician immediately available     Pt educated on Cardiac Rehab Program,  warm up, stretching, use of RPE scale, application of heart monitor, home exercise and exercise guidelines, pre exercise meal, Diabetic guidelines for Cardiac Rehab, s/s to report ,Cleaning and use of equipment, see Epic for all other education completed with patient today.  Verbal teach back verified   Orthopedic Problems: none    Are you being hurt, hit, or frightened by anyone at home or in your life? no    Are you being neglected by a caregiver? N/A Shoulder flexibility/Range of motion: Average      Recommended arm activity: Any    Chair sit and reach within: 10 inches   Leg flexibility: Average    Leg Strength/Balance/Five times sit to stand: 20 seconds.     Chose one: Fall Risk    Recommended stretching: Standing    Assessment: see above assessment    Family attends IA: no Time of arrival: 1000  Time of departure: 1145     Patient Goals: feel better with getting back to exercising.         8/7/2019  3:15 PM  Katharine Magdaleno RN

## 2019-08-07 NOTE — PATIENT INSTRUCTIONS
Coronary Artery Disease, Female    Coronary artery disease (CAD) is a condition in which the arteries that lead to the heart (coronary arteries) become narrow or blocked. The narrowing or blockage can lead to decreased blood flow to the heart. Prolonged reduced blood flow can cause a heart attack (myocardial infarction or MI). This condition may also be called coronary heart disease.  Because CAD is the leading cause of death in women, it is important to understand what causes this condition and how it is treated.  What are the causes?  CAD is most often caused by atherosclerosis. This is the buildup of fat and cholesterol (plaque) on the inside of the arteries. Over time, the plaque may narrow or block the artery, reducing blood flow to the heart. Plaque can also become weak and break off within a coronary artery and cause a sudden blockage. Other less common causes of CAD include:  · An embolism or blood clot in a coronary artery.  · A tearing of the artery (spontaneous coronary artery dissection).  · An aneurysm.  · Inflammation (vasculitis) in the artery wall.  What increases the risk?  The following factors may make you more likely to develop this condition:  · Age. Women over age 55 are at a greater risk of CAD.  · Family history of CAD.  · High blood pressure (hypertension).  · Diabetes.  · High cholesterol levels.  · Tobacco use.  · Lack of exercise.  · Menopause.  ? All postmenopausal women are at greater risk of CAD.  ? Women who have experienced menopause between the ages of 40-45 (early menopause) are at a higher risk of CAD.  ? Women who have experienced menopause before age 40 (premature menopause) are at a very high risk of CAD.  · Excessive alcohol use  · A diet high in saturated and trans fats, such as fried food and processed meat.  Other possible risk factors include:  · High stress levels.  · Depression  · Obesity.  · Sleep apnea.  What are the signs or symptoms?  Many people do not have any  symptoms during the early stages of CAD. As the condition progresses, symptoms may include:  · Chest pain (angina). The pain can:  ? Feel like crushing or squeezing, or a tightness, pressure, fullness, or heaviness in the chest.  ? Last more than a few minutes or can stop and recur. The pain tends to get worse with exercise or stress and to fade with rest.  · Pain in the arms, neck, jaw, or back.  · Unexplained heartburn or indigestion.  · Shortness of breath.  · Nausea.  · Sudden cold sweats.  · Sudden light-headedness.  · Fluttering or fast heartbeat (palpitations).  Many women have chest discomfort and the other symptoms. However, women often have unusual (atypical) symptoms, such as:  · Fatigue.  · Vomiting.  · Unexplained feelings of nervousness or anxiety.  · Unexplained weakness.  · Dizziness or fainting.  How is this diagnosed?  This condition is diagnosed based on:  · Your family and medical history.  · A physical exam.  · Tests, including:  ? A test to check the electrical signals in your heart (electrocardiogram).  ? Exercise stress test. This looks for signs of blockage when the heart is stressed with exercise, such as running on a treadmill.  ? Pharmacologic stress test. This test looks for signs of blockage when the heart is being stressed with a medicine.  ? Blood tests.  ? Coronary angiogram. This is a procedure to look at the coronary arteries to see if there is any blockage. During this test, a dye is injected into your arteries so they appear on an X-ray.  ? A test that uses sound waves to take a picture of your heart (echocardiogram).  ? Chest X-ray.  How is this treated?  This condition may be treated by:  · Healthy lifestyle changes to reduce risk factors.  · Medicines such as:  ? Antiplatelet medicines and blood-thinning medicines, such as aspirin. These help prevent blood clots.  ? Nitroglycerin.  ? Blood pressure medicines.  ? Cholesterol-lowering medicine.  · Coronary angioplasty and  stenting. During this procedure, a thin, flexible tube is inserted through a blood vessel and into a blocked artery. A balloon or similar device on the end of the tube is inflated to open up the artery. In some cases, a small, mesh tube (stent) is inserted into the artery to keep it open.  · Coronary artery bypass surgery. During this surgery, veins or arteries from other parts of the body are used to create a bypass around the blockage and allow blood to reach your heart.  Follow these instructions at home:  Medicines  · Take over-the-counter and prescription medicines only as told by your health care provider.  · Do not take the following medicines unless your health care provider approves:  ? NSAIDs, such as ibuprofen, naproxen, or celecoxib.  ? Vitamin supplements that contain vitamin A, vitamin E, or both.  ? Hormone replacement therapy that contains estrogen with or without progestin.  Lifestyle  · Follow an exercise program approved by your health care provider. Aim for 150 minutes of moderate exercise or 75 minutes of vigorous exercise each week.  · Maintain a healthy weight or lose weight as approved by your health care provider.  · Rest when you are tired.  · Learn to manage stress or try to limit your stress. Ask your health care provider for suggestions if you need help.  · Get screened for depression and seek treatment, if needed.  · Do not use any products that contain nicotine or tobacco, such as cigarettes and e-cigarettes. If you need help quitting, ask your health care provider.  · Do not use illegal drugs.  Eating and drinking  · Follow a heart-healthy diet. A dietitian can help educate you about healthy food options and changes. In general, eat plenty of fruits and vegetables, lean meats, and whole grains.  · Avoid foods high in:  ? Sugar.  ? Salt (sodium).  ? Saturated fats, such as processed or fatty meat.  ? Trans fats, such as fried food.  · Use healthy cooking methods such as roasting,  grilling, broiling, baking, poaching, steaming, or stir-frying.  · If you drink alcohol, and your health care provider approves, limit your alcohol intake to no more than 1 drink per day. One drink equals 12 ounces of beer, 5 ounces of wine, or 1½ ounces of hard liquor.  General instructions  · Manage any other health conditions, such as hypertension and diabetes. These conditions affect your heart.  · Your health care provider may ask you to monitor your blood pressure. Ideally, your blood pressure should be below 130/80.  · Keep all follow-up visits as told by your health care provider. This is important.  Get help right away if:  · You have pain in your chest, neck, arm, jaw, stomach, or back that:  ? Lasts more than a few minutes.  ? Is recurring.  ? Is not relieved by taking medicine under your tongue (sublingualnitroglycerin).  · You have profuse sweating without cause.  · You have unexplained:  ? Heartburn or indigestion.  ? Shortness of breath or difficulty breathing.  ? Fluttering or fast heartbeat (palpitations).  ? Nausea or vomiting.  ? Fatigue.  ? Feelings of nervousness or anxiety.  ? Weakness.  ? Diarrhea.  · You have sudden light-headedness or dizziness.  · You faint.  · You feel like hurting yourself or think about taking your own life.  These symptoms may represent a serious problem that is an emergency. Do not wait to see if the symptoms will go away. Get medical help right away. Call your local emergency services (911 in the [place] Do not drive yourself to the hospital.  Summary  · Coronary artery disease (CAD) is a process in which the arteries that lead to the heart (coronary arteries) become narrow or blocked. The narrowing or blockage can lead to a heart attack.  · Many women have chest discomfort and other common symptoms of CAD. However, women often have different (atypical) symptoms, such as fatigue, vomiting, and dizziness or weakness.  · CAD can be treated with lifestyle changes,  medicines, surgery, or a combination of these treatments.  This information is not intended to replace advice given to you by your health care provider. Make sure you discuss any questions you have with your health care provider.  Document Released: 03/11/2013 Document Revised: 12/08/2017 Document Reviewed: 12/08/2017  [employer] Interactive Patient Education © 2019 [employer]    Pre-Exercise Meal Plan  Eating certain foods and drinking fluids before exercising or physical activity can give you more energy and can help your muscles recover after activity. Your pre-exercise meal plan depends on:  · Your personal needs.  · Recommendations from your health care provider or a diet and nutrition specialist (dietitian).  · Any health conditions you have that affect what you can and cannot eat.  · How long your exercise session will be.  · What times you plan to eat and exercise.  What are tips for following this plan?    Reading food labels  · Avoid foods that have added sugar, fat, and salt (sodium).  · Avoid foods that are high in fiber.  Meal planning  · In general, you should eat your pre-exercise meal 3-4 hours before you exercise. If you eat more than 3-4 hours before exercising, you should eat more calories. Your meal should have enough calories so that you will not be hungry before you exercise.  · Include carbohydrates and a moderate amount of lean protein in your pre-exercise meal.  Lifestyle  30-60 minutes before you will be exercising:  · Stop eating.  · Only drink fluids, such as:  ? Water.  ? Sports drinks.  ? Fruit juice.  ? Sports gels.  General information  · Eat foods that you are familiar with and that your body digests without problems. Doing this can help you avoid stomach problems while exercising.  · Drink enough fluids to keep you well-hydrated before, during, and after exercising. Good choices include:  ? Water.  ? Sports drinks. These are only recommended if you exercise for more than 60 minutes  at a time.  What foods should I eat?    Fruits  Banana. Apple. Berries. Melon. Raisins.  Vegetables  Celery. Carrots. Baked potato.  Grains  Pasta. Bagel. Toast. Oatmeal.  Meats and other proteins  Fish. Poultry. Lean meat. Nut butters.  Dairy  Low-fat or fat-free dairy products, such as milk or yogurt.  Other foods  Foods that have both carbohydrates and protein, such as:  · Bagel with peanut butter.  · Turkey sandwich.  · Oatmeal with fruit and nuts.  The items listed above may not be a complete list of recommended foods and beverages. Contact a dietitian for more information.  What foods should I avoid?  Vegetables  Onions. Cauliflower. Cabbage. Leafy greens. Fried vegetables, including french fries.  Grains  Crackers. Pretzels. Donuts. Pastries.  Meats and other proteins  Fatty cuts of meat. Sausage. Akbar. Fried meats. Dried beans. Legumes.  Dairy  Full-fat milk or yogurt. Cream. Sour cream. Ice cream.  Beverages  Carbonated soft drinks. Coffee. Black tea. Hot chocolate.  Sweets and desserts  Cakes. Cookies. Candy.  Other foods  Processed or pre-made foods. Pizza. Fried food. Fast food.  The items listed above may not be a complete list of foods and beverages to avoid. Contact a dietitian for more information.  Summary  · The best time to eat a pre-exercise meal depends on your personal needs and when you plan to exercise. In general, you should eat 3-4 hours before you exercise.  · Your pre-exercise meal should include carbohydrates, fluids, and lean proteins. Choose familiar foods that are easy to digest.  · Avoid foods that are high in salt (sodium), sugar, fat, or fiber in your pre-exercise meal.  This information is not intended to replace advice given to you by your health care provider. Make sure you discuss any questions you have with your health care provider.  Document Released: 12/18/2006 Document Revised: 11/13/2018 Document Reviewed: 11/13/2018  [employer] Interactive Patient Education © 2019  [employer]    Exercise Guidelines During Cardiac Rehabilitation  When you are recovering from a heart condition, such as from heart surgery, heart attack, or heart failure, it is important to have heart-healthy habits, including exercise routines. Discuss an appropriate exercise program with your heart specialist (cardiologist) and rehabilitation therapist.  It is important to design a program that is safe and effective for you. The program should meet your specific abilities and needs. Walking, biking, jogging, and swimming are all good aerobic activities. These take light to moderate effort. Adding some light resistance training is also important. Even simple lifestyle changes can help. These lifestyle changes may include parking farther from the store or taking the stairs instead of the elevator.  At first, you may begin exercising under supervision, such as at a hospital or clinic. Over time, you may begin exercising at home, with your health care provider's approval.  Types of exercise  Aerobic exercise  During cardiac rehabilitation, it is important to do aerobic activities. Aerobic exercise keeps joints and muscles moving. It involves large muscle groups. It is also rhythmic and must be done for a longer period of time. Doing these exercises improves circulation and endurance. Examples of aerobic exercise include:  · Swimming.  · Walking.  · Hiking.  · Jogging.  · Cross-country skiing.  · Biking.  · Dancing.    Static exercise  Static exercise (isometric exercise) uses muscles at high intensities without moving the joints. Some examples of static exercise include pushing against a heavy couch that does not move, doing a wall sit, or holding a plank position. Static exercise improves strength but also quickly increases blood pressure. Follow these guidelines:  · If you have circulation problems or high blood pressure, talk with your health care provider before starting any static exercise routines. Do not  do static exercises if your health care provider tells you not to.  · Do not hold your breath while doing static exercises. Holding your breath during static exercises can raise your blood pressure to a dangerously high level.    Weight-resistance exercise  Weight-resistance exercises are another important part of rehabilitation. These exercises strengthen your muscles by making them work against resistance. Resistance exercises may help you return to activities of daily living sooner and improve your quality of life. They also help reduce cardiac risk factors. Examples of weight-resistance exercise include using:  · Free weights.  · Weight-lifting machines.  · Large, specially designed rubber bands.  You will usually do weight-resistance exercises 2 times a week with a 2-day rest period between workouts.  Stretching  Stretching before you exercise warms up your muscles and prevents injury. Stretching also improves your flexibility, balance, coordination, and range of motion. Follow these guidelines:  · Stretch both before and after exercising.  · Do not force a muscle or joint into a painful angle. Stretching should be a relaxing part of your exercise routine.  · Once you feel resistance in your muscle, hold the stretch for a few seconds. Make sure you keep breathing while you hold the stretch.  · Go slowly when doing all stretches.  Setting a pace  · Choose a pace that is comfortable for you.  ? You should be able to talk while exercising. If you are short of breath or unable to speak while you exercise, slow down.  ? If you are able to sing while exercising, you are not exercising hard enough.  · Keep track of how hard you are working as you exercise (exertion level). Your rehabilitation therapist can teach you to use a mental scale to measure your level of exertion (perceived exertion). Using a mental scale, you will think about your exertion level and rate it in a range from 6 to 20.  ? A rating of 6 to 9. This  "means that you are doing \"very light\" exercise and are not exerting yourself enough. For a healthy person, this may be walking at a slow pace.  ? A rating of 11 to 15. This is exercise that is \"somewhat hard.\" For a healthy exercise session, you should aim for an exertion rate that is within this range.  ? A rating of 16 to 17. This is considered \"very hard\" or strenuous. For a healthy person, exercise at this rating may start to feel heavy and difficult.  ? A rating of 19 to 20. This means that you are working \"extremely hard.\" For most people, these numbers represent the hardest you've ever worked to exercise.  · Your health care provider or cardiac rehabilitation specialist may also recommend that you wear a heart rate monitor while you exercise. This will help you keep track of your heart rate zones and how hard your heart is working.  Frequency  As you are recovering, it is important to start exercising slowly and to gradually work up to your goal. Work with your health care provider to set up an exercise routine that works for you. Generally, cardiac rehabilitation exercise should include:  · 40 minutes of aerobic activity 3 - 4 days a week.  · Stretching and strength exercises 2 - 3 days a week.  Contact a doctor if:  · You have any of the following symptoms while exercising:  ? Pain, pressure, or burning in your chest, jaw, shoulder, or back (angina).  ? Lightheadedness.  ? Dizziness.  ? Irregular or fast heartbeat.  ? Shortness of breath.  · You are extremely tired after exercising.  Get help right away if:  · You have angina that does not get better with medicine and lasts for more than 5 minutes.  · You have nausea or you vomit.  · You have excessive sweating that is not caused by exercise.  Summary  · When you are recovering from a heart condition, it is important to have heart-healthy habits, including exercise routines.  · At first, you may begin exercising under supervision, such as at a hospital or " clinic. Over time, you may begin exercising at home, with your health care provider's approval.  · Aim for 40 minutes of aerobic exercises 3 - 4 days a week.  · Aim to do stretching and strength exercises 2 - 3 days a week.  · Choose a pace that is comfortable for you. You should be able to talk while exercising.  This information is not intended to replace advice given to you by your health care provider. Make sure you discuss any questions you have with your health care provider.  Document Released: 12/23/2014 Document Revised: 11/17/2017 Document Reviewed: 11/17/2017  [employer] Interactive Patient Education © 2019 [employer]    Stress  Stress is a normal reaction to life events. Stress is what you feel when life demands more than you are used to, or more than you think you can handle. Some stress can be useful, such as studying for a test or meeting a deadline at work. Stress that occurs too often or for too long can cause problems. It can affect your emotional health and interfere with relationships and normal daily activities. Too much stress can weaken your body's defense system (immune system) and increase your risk for physical illness. If you already have a medical problem, stress can make it worse.  What are the causes?  All sorts of life events can cause stress. An event that causes stress for one person may not be stressful for another person. Major life events, whether positive or negative, commonly cause stress. Examples include:  · Losing a job or starting a new job.  · Losing a loved one.  · Moving to a new town or home.  · Getting  or .  · Having a baby.  · Injury or illness.  Less obvious life events can also cause stress, especially if they occur day after day or in combination with each other. Examples include:  · Working long hours.  · Driving in traffic.  · Caring for children.  · Being in debt.  · Being in a difficult relationship.  What are the signs or symptoms?  Stress can  cause emotional symptoms, including:  · Anxiety. This is feeling worried, afraid, on edge, overwhelmed, or out of control.  · Anger, including irritation or impatience.  · Depression. This is feeling sad, down, helpless, or guilty.  · Trouble focusing, remembering, or making decisions.  Stress can cause physical symptoms, including:  · Aches and pains. These may affect your head, neck, back, stomach, or other areas of your body.  · Tight muscles or a clenched jaw.  · Low energy.  · Trouble sleeping.  Stress can cause unhealthy behaviors, including:  · Eating to feel better (overeating) or skipping meals.  · Working too much or putting off tasks.  · Smoking, drinking alcohol, or using drugs to feel better.  How is this diagnosed?  Stress is diagnosed through an assessment by your health care provider. He or she may diagnose this condition based on:  · Your symptoms and any stressful life events.  · Your medical history.  · Tests to rule out other causes of your symptoms.  Depending on your condition, your health care provider may refer you to a specialist for further evaluation.  How is this treated?    Stress management techniques are the recommended treatment for stress. Medicine is not typically recommended for the treatment of stress.  Techniques to reduce your reaction to stressful life events include:  · Stress identification. Monitor yourself for symptoms of stress and identify what causes stress for you. These skills may help you to avoid or prepare for stressful events.  · Time management. Set your priorities, keep a calendar of events, and learn to say “no.” Taking these actions can help you avoid making too many commitments.  Techniques for coping with stress include:  · Rethinking the problem. Try to think realistically about stressful events rather than ignoring them or overreacting. Try to find the positives in a stressful situation rather than focusing on the negatives.  · Exercise. Physical exercise  can release both physical and emotional tension. The key is to find a form of exercise that you enjoy and do it regularly.  · Relaxation techniques. These relax the body and mind. The key is to find one or more that you enjoy and use the technique(s) regularly. Examples include:  ? Meditation, deep breathing, or progressive relaxation techniques.  ? Yoga or sandra chi.  ? Biofeedback, mindfulness techniques, or journaling.  ? Listening to music, being out in nature, or participating in other hobbies.  · Practicing a healthy lifestyle. Eat a balanced diet, drink plenty of water, limit or avoid caffeine, and get plenty of sleep.  · Having a strong support network. Spend time with family, friends, or other people you enjoy being around. Express your feelings and talk things over with someone you trust.  Counseling or talk therapy with a mental health professional may be helpful if you are having trouble managing stress on your own.  Follow these instructions at home:  Lifestyle    · Avoid drugs.  · Do not use any products that contain nicotine or tobacco, such as cigarettes and e-cigarettes. If you need help quitting, ask your health care provider.  · Limit alcohol intake to no more than 1 drink a day for nonpregnant women and 2 drinks a day for men. One drink equals 12 oz of beer, 5 oz of wine, or 1½ oz of hard liquor.  · Do not use alcohol or drugs to relax.  · Eat a balanced diet that includes fresh fruits and vegetables, whole grains, lean meats, fish, eggs, and beans, and low-fat dairy. Avoid processed foods and foods high in added fat, sugar, and salt.  · Exercise at least 30 minutes on 5 or more days each week.  · Get 7-8 hours of sleep each night.  General instructions    · Practice stress management techniques as discussed with your health care provider.  · Drink enough fluid to keep your urine clear or pale yellow.  · Take over-the-counter and prescription medicines only as told by your health care  provider.  · Keep all follow-up visits as told by your health care provider. This is important.  Contact a health care provider if:  · Your symptoms get worse.  · You have new symptoms.  · You feel overwhelmed by your problems and can no longer manage them on your own.  Get help right away if:  · You have thoughts of hurting yourself or others.  If you ever feel like you may hurt yourself or others, or have thoughts about taking your own life, get help right away. You can go to your nearest emergency department or call:  · Your local emergency services (911 in the [place]  · A suicide crisis helpline, such as the National Suicide Prevention Lifeline at 1-489.845.2676. This is open 24 hours a day.  Summary  · Stress is a normal reaction to life events. It can cause problems if it happens too often or for too long.  · Practicing stress management techniques is the best way to treat stress.  · Counseling or talk therapy with a mental health professional may be helpful if you are having trouble managing stress on your own.  This information is not intended to replace advice given to you by your health care provider. Make sure you discuss any questions you have with your health care provider.  Document Released: 06/13/2002 Document Revised: 02/07/2018 Document Reviewed: 02/07/2018  [employer] Interactive Patient Education © 2019 [employer]    Major Depressive Disorder, Adult  Major depressive disorder (MDD) is a mental health condition. MDD often makes you feel sad, hopeless, or helpless. MDD can also cause symptoms in your body. MDD can affect your:  · Work.  · School.  · Relationships.  · Other normal activities.  MDD can range from mild to very bad. It may occur once (single episode MDD). It can also occur many times (recurrent MDD).  The main symptoms of MDD often include:  · Feeling sad, depressed, or irritable most of the time.  · Loss of interest.  MDD symptoms also include:  · Sleeping too much or too  little.  · Eating too much or too little.  · A change in your weight.  · Feeling tired (fatigue) or having low energy.  · Feeling worthless.  · Feeling guilty.  · Trouble making decisions.  · Trouble thinking clearly.  · Thoughts of suicide or harming others.  · Feeling weak.  · Feeling agitated.  · Keeping yourself from being around other people (isolation).  Follow these instructions at home:  Activity  · Do these things as told by your doctor:  ? Go back to your normal activities.  ? Exercise regularly.  ? Spend time outdoors.  Alcohol  · Talk with your doctor about how alcohol can affect your antidepressant medicines.  · Do not drink alcohol. Or, limit how much alcohol you drink.  ? This means no more than 1 drink a day for nonpregnant women and 2 drinks a day for men. One drink equals one of these:  § 12 oz of beer.  § 5 oz of wine.  § 1½ oz of hard liquor.  General instructions  · Take over-the-counter and prescription medicines only as told by your doctor.  · Eat a healthy diet.  · Get plenty of sleep.  · Find activities that you enjoy. Make time to do them.  · Think about joining a support group. Your doctor may be able to suggest a group for you.  · Keep all follow-up visits as told by your doctor. This is important.  Where to find more information:  · National [place] on Mental Illness:  ? www.yadiel.org  · [place] of Mental Health:  ? www.nimh.nih.gov  · National Suicide Prevention Lifeline:  ? 1-528.473.5046. This is free, 24-hour help.  Contact a doctor if:  · Your symptoms get worse.  · You have new symptoms.  Get help right away if:  · You self-harm.  · You see, hear, taste, smell, or feel things that are not present (hallucinate).  If you ever feel like you may hurt yourself or others, or have thoughts about taking your own life, get help right away. You can go to your nearest emergency department or call:  · Your local emergency services (911 in the [place]  · A suicide crisis  helpline, such as the National Suicide Prevention Lifeline:  ? 0-499-542-2418. This is open 24 hours a day.  This information is not intended to replace advice given to you by your health care provider. Make sure you discuss any questions you have with your health care provider.  Document Released: 11/28/2016 Document Revised: 09/03/2017 Document Reviewed: 09/03/2017  [employer] Interactive Patient Education © 2019 [employer]    Generalized Anxiety Disorder, Adult  Generalized anxiety disorder (AUTUMN) is a mental health disorder. People with this condition constantly worry about everyday events. Unlike normal anxiety, worry related to AUTUMN is not triggered by a specific event. These worries also do not fade or get better with time. AUTUMN interferes with life functions, including relationships, work, and school.  AUTUMN can vary from mild to severe. People with severe AUTUMN can have intense waves of anxiety with physical symptoms (panic attacks).  What are the causes?  The exact cause of AUTUMN is not known.  What increases the risk?  This condition is more likely to develop in:  · Women.  · People who have a family history of anxiety disorders.  · People who are very shy.  · People who experience very stressful life events, such as the death of a loved one.  · People who have a very stressful family environment.  What are the signs or symptoms?  People with AUTUMN often worry excessively about many things in their lives, such as their health and family. They may also be overly concerned about:  · Doing well at work.  · Being on time.  · Natural disasters.  · Friendships.  Physical symptoms of AUTUMN include:  · Fatigue.  · Muscle tension or having muscle twitches.  · Trembling or feeling shaky.  · Being easily startled.  · Feeling like your heart is pounding or racing.  · Feeling out of breath or like you cannot take a deep breath.  · Having trouble falling asleep or staying asleep.  · Sweating.  · Nausea, diarrhea, or irritable  bowel syndrome (IBS).  · Headaches.  · Trouble concentrating or remembering facts.  · Restlessness.  · Irritability.  How is this diagnosed?  Your health care provider can diagnose AUTUMN based on your symptoms and medical history. You will also have a physical exam. The health care provider will ask specific questions about your symptoms, including how severe they are, when they started, and if they come and go. Your health care provider may ask you about your use of alcohol or drugs, including prescription medicines. Your health care provider may refer you to a mental health specialist for further evaluation.  Your health care provider will do a thorough examination and may perform additional tests to rule out other possible causes of your symptoms.  To be diagnosed with AUTUMN, a person must have anxiety that:  · Is out of his or her control.  · Affects several different aspects of his or her life, such as work and relationships.  · Causes distress that makes him or her unable to take part in normal activities.  · Includes at least three physical symptoms of AUTUMN, such as restlessness, fatigue, trouble concentrating, irritability, muscle tension, or sleep problems.  Before your health care provider can confirm a diagnosis of AUTUMN, these symptoms must be present more days than they are not, and they must last for six months or longer.  How is this treated?  The following therapies are usually used to treat AUTUMN:  · Medicine. Antidepressant medicine is usually prescribed for long-term daily control. Antianxiety medicines may be added in severe cases, especially when panic attacks occur.  · Talk therapy (psychotherapy). Certain types of talk therapy can be helpful in treating AUTUMN by providing support, education, and guidance. Options include:  ? Cognitive behavioral therapy (CBT). People learn coping skills and techniques to ease their anxiety. They learn to identify unrealistic or negative thoughts and behaviors and to  replace them with positive ones.  ? Acceptance and commitment therapy (ACT). This treatment teaches people how to be mindful as a way to cope with unwanted thoughts and feelings.  ? Biofeedback. This process trains you to manage your body's response (physiological response) through breathing techniques and relaxation methods. You will work with a therapist while machines are used to monitor your physical symptoms.  · Stress management techniques. These include yoga, meditation, and exercise.  A mental health specialist can help determine which treatment is best for you. Some people see improvement with one type of therapy. However, other people require a combination of therapies.  Follow these instructions at home:  · Take over-the-counter and prescription medicines only as told by your health care provider.  · Try to maintain a normal routine.  · Try to anticipate stressful situations and allow extra time to manage them.  · Practice any stress management or self-calming techniques as taught by your health care provider.  · Do not punish yourself for setbacks or for not making progress.  · Try to recognize your accomplishments, even if they are small.  · Keep all follow-up visits as told by your health care provider. This is important.  Contact a health care provider if:  · Your symptoms do not get better.  · Your symptoms get worse.  · You have signs of depression, such as:  ? A persistently sad, cranky, or irritable mood.  ? Loss of enjoyment in activities that used to bring you rekha.  ? Change in weight or eating.  ? Changes in sleeping habits.  ? Avoiding friends or family members.  ? Loss of energy for normal tasks.  ? Feelings of guilt or worthlessness.  Get help right away if:  · You have serious thoughts about hurting yourself or others.  If you ever feel like you may hurt yourself or others, or have thoughts about taking your own life, get help right away. You can go to your nearest emergency department or  "call:  · Your local emergency services (911 in the [place]  · A suicide crisis helpline, such as the National Suicide Prevention Lifeline at 1-602.883.8823. This is open 24 hours a day.  Summary  · Generalized anxiety disorder (AUTUMN) is a mental health disorder that involves worry that is not triggered by a specific event.  · People with AUTUMN often worry excessively about many things in their lives, such as their health and family.  · AUTUMN may cause physical symptoms such as restlessness, trouble concentrating, sleep problems, frequent sweating, nausea, diarrhea, headaches, and trembling or muscle twitching.  · A mental health specialist can help determine which treatment is best for you. Some people see improvement with one type of therapy. However, other people require a combination of therapies.  This information is not intended to replace advice given to you by your health care provider. Make sure you discuss any questions you have with your health care provider.  Document Released: 04/14/2014 Document Revised: 11/07/2017 Document Reviewed: 11/07/2017  [employer] Interactive Patient Education © 2019 [employer]    Managing Your Hypertension  Hypertension is commonly called high blood pressure. This is when the force of your blood pressing against the walls of your arteries is too strong. Arteries are blood vessels that carry blood from your heart throughout your body. Hypertension forces the heart to work harder to pump blood, and may cause the arteries to become narrow or stiff. Having untreated or uncontrolled hypertension can cause heart attack, stroke, kidney disease, and other problems.  What are blood pressure readings?  A blood pressure reading consists of a higher number over a lower number. Ideally, your blood pressure should be below 120/80. The first (\"top\") number is called the systolic pressure. It is a measure of the pressure in your arteries as your heart beats. The second (\"bottom\") number is called " the diastolic pressure. It is a measure of the pressure in your arteries as the heart relaxes.  What does my blood pressure reading mean?  Blood pressure is classified into four stages. Based on your blood pressure reading, your health care provider may use the following stages to determine what type of treatment you need, if any. Systolic pressure and diastolic pressure are measured in a unit called mm Hg.  Normal  · Systolic pressure: below 120.  · Diastolic pressure: below 80.  Elevated  · Systolic pressure: 120-129.  · Diastolic pressure: below 80.  Hypertension stage 1  · Systolic pressure: 130-139.  · Diastolic pressure: 80-89.  Hypertension stage 2  · Systolic pressure: 140 or above.  · Diastolic pressure: 90 or above.  What health risks are associated with hypertension?  Managing your hypertension is an important responsibility. Uncontrolled hypertension can lead to:  · A heart attack.  · A stroke.  · A weakened blood vessel (aneurysm).  · Heart failure.  · Kidney damage.  · Eye damage.  · Metabolic syndrome.  · Memory and concentration problems.  What changes can I make to manage my hypertension?  Hypertension can be managed by making lifestyle changes and possibly by taking medicines. Your health care provider will help you make a plan to bring your blood pressure within a normal range.  Eating and drinking    · Eat a diet that is high in fiber and potassium, and low in salt (sodium), added sugar, and fat. An example eating plan is called the DASH (Dietary Approaches to Stop Hypertension) diet. To eat this way:  ? Eat plenty of fresh fruits and vegetables. Try to fill half of your plate at each meal with fruits and vegetables.  ? Eat whole grains, such as whole wheat pasta, brown rice, or whole grain bread. Fill about one quarter of your plate with whole grains.  ? Eat low-fat diary products.  ? Avoid fatty cuts of meat, processed or cured meats, and poultry with skin. Fill about one quarter of your plate  with lean proteins such as fish, chicken without skin, beans, eggs, and tofu.  ? Avoid premade and processed foods. These tend to be higher in sodium, added sugar, and fat.  · Reduce your daily sodium intake. Most people with hypertension should eat less than 1,500 mg of sodium a day.  · Limit alcohol intake to no more than 1 drink a day for nonpregnant women and 2 drinks a day for men. One drink equals 12 oz of beer, 5 oz of wine, or 1½ oz of hard liquor.  Lifestyle  · Work with your health care provider to maintain a healthy body weight, or to lose weight. Ask what an ideal weight is for you.  · Get at least 30 minutes of exercise that causes your heart to beat faster (aerobic exercise) most days of the week. Activities may include walking, swimming, or biking.  · Include exercise to strengthen your muscles (resistance exercise), such as weight lifting, as part of your weekly exercise routine. Try to do these types of exercises for 30 minutes at least 3 days a week.  · Do not use any products that contain nicotine or tobacco, such as cigarettes and e-cigarettes. If you need help quitting, ask your health care provider.  · Control any long-term (chronic) conditions you have, such as high cholesterol or diabetes.  Monitoring  · Monitor your blood pressure at home as told by your health care provider. Your personal target blood pressure may vary depending on your medical conditions, your age, and other factors.  · Have your blood pressure checked regularly, as often as told by your health care provider.  Working with your health care provider  · Review all the medicines you take with your health care provider because there may be side effects or interactions.  · Talk with your health care provider about your diet, exercise habits, and other lifestyle factors that may be contributing to hypertension.  · Visit your health care provider regularly. Your health care provider can help you create and adjust your plan for  managing hypertension.  Will I need medicine to control my blood pressure?  Your health care provider may prescribe medicine if lifestyle changes are not enough to get your blood pressure under control, and if:  · Your systolic blood pressure is 130 or higher.  · Your diastolic blood pressure is 80 or higher.  Take medicines only as told by your health care provider. Follow the directions carefully. Blood pressure medicines must be taken as prescribed. The medicine does not work as well when you skip doses. Skipping doses also puts you at risk for problems.  Contact a health care provider if:  · You think you are having a reaction to medicines you have taken.  · You have repeated (recurrent) headaches.  · You feel dizzy.  · You have swelling in your ankles.  · You have trouble with your vision.  Get help right away if:  · You develop a severe headache or confusion.  · You have unusual weakness or numbness, or you feel faint.  · You have severe pain in your chest or abdomen.  · You vomit repeatedly.  · You have trouble breathing.  Summary  · Hypertension is when the force of blood pumping through your arteries is too strong. If this condition is not controlled, it may put you at risk for serious complications.  · Your personal target blood pressure may vary depending on your medical conditions, your age, and other factors. For most people, a normal blood pressure is less than 120/80.  · Hypertension is managed by lifestyle changes, medicines, or both. Lifestyle changes include weight loss, eating a healthy, low-sodium diet, exercising more, and limiting alcohol.  This information is not intended to replace advice given to you by your health care provider. Make sure you discuss any questions you have with your health care provider.  Document Released: 09/11/2013 Document Revised: 11/15/2017 Document Reviewed: 11/15/2017  [employer] Interactive Patient Education © 2019 [employer]    Heart-Healthy Eating  Plan  Heart-healthy meal planning includes:  · Eating less unhealthy fats.  · Eating more healthy fats.  · Making other changes in your diet.  Talk with your doctor or a diet specialist (dietitian) to create an eating plan that is right for you.  What is my plan?  Your doctor may recommend an eating plan that includes:  · Total fat: ______% or less of total calories a day.  · Saturated fat: ______% or less of total calories a day.  · Cholesterol: less than _________mg a day.  What are tips for following this plan?  Cooking  Avoid frying your food. Try to bake, boil, grill, or broil it instead. You can also reduce fat by:  · Removing the skin from poultry.  · Removing all visible fats from meats.  · Steaming vegetables in water or broth.  Meal planning    · At meals, divide your plate into four equal parts:  ? Fill one-half of your plate with vegetables and green salads.  ? Fill one-fourth of your plate with whole grains.  ? Fill one-fourth of your plate with lean protein foods.  · Eat 4-5 servings of vegetables per day. A serving of vegetables is:  ? 1 cup of raw or cooked vegetables.  ? 2 cups of raw leafy greens.  · Eat 4-5 servings of fruit per day. A serving of fruit is:  ? 1 medium whole fruit.  ? ¼ cup of dried fruit.  ? ½ cup of fresh, frozen, or canned fruit.  ? ½ cup of 100% fruit juice.  · Eat more foods that have soluble fiber. These are apples, broccoli, carrots, beans, peas, and barley. Try to get 20-30 g of fiber per day.  · Eat 4-5 servings of nuts, legumes, and seeds per week:  ? 1 serving of dried beans or legumes equals ½ cup after being cooked.  ? 1 serving of nuts is ¼ cup.  ? 1 serving of seeds equals 1 tablespoon.  General information  · Eat more home-cooked food. Eat less restaurant, buffet, and fast food.  · Limit or avoid alcohol.  · Limit foods that are high in starch and sugar.  · Avoid fried foods.  · Lose weight if you are overweight.  · Keep track of how much salt (sodium) you eat. This  is important if you have high blood pressure. Ask your doctor to tell you more about this.  · Try to add vegetarian meals each week.  Fats  · Choose healthy fats. These include olive oil and canola oil, flaxseeds, walnuts, almonds, and seeds.  · Eat more omega-3 fats. These include salmon, mackerel, sardines, tuna, flaxseed oil, and ground flaxseeds. Try to eat fish at least 2 times each week.  · Check food labels. Avoid foods with trans fats or high amounts of saturated fat.  · Limit saturated fats.  ? These are often found in animal products, such as meats, butter, and cream.  ? These are also found in plant foods, such as palm oil, palm kernel oil, and coconut oil.  · Avoid foods with partially hydrogenated oils in them. These have trans fats. Examples are stick margarine, some tub margarines, cookies, crackers, and other baked goods.  What foods can I eat?  Fruits  All fresh, canned (in natural juice), or frozen fruits.  Vegetables  Fresh or frozen vegetables (raw, steamed, roasted, or grilled). Green salads.  Grains  Most grains. Choose whole wheat and whole grains most of the time. Rice and pasta, including brown rice and pastas made with whole wheat.  Meats and other proteins  Lean, well-trimmed beef, veal, pork, and lamb. Chicken and turkey without skin. All fish and shellfish. Wild duck, rabbit, pheasant, and venison. Egg whites or low-cholesterol egg substitutes. Dried beans, peas, lentils, and tofu. Seeds and most nuts.  Dairy  Low-fat or nonfat cheeses, including ricotta and mozzarella. Skim or 1% milk that is liquid, powdered, or evaporated. Buttermilk that is made with low-fat milk. Nonfat or low-fat yogurt.  Fats and oils  Non-hydrogenated (trans-free) margarines. Vegetable oils, including soybean, sesame, sunflower, olive, peanut, safflower, corn, canola, and cottonseed. Salad dressings or mayonnaise made with a vegetable oil.  Beverages  Mineral water. Coffee and tea. Diet carbonated  beverages.  Sweets and desserts  Sherbet, gelatin, and fruit ice. Small amounts of dark chocolate.  Limit all sweets and desserts.  Seasonings and condiments  All seasonings and condiments.  The items listed above may not be a complete list of foods and drinks you can eat. Contact a dietitian for more options.  What foods should I avoid?  Fruits  Canned fruit in heavy syrup. Fruit in cream or butter sauce. Fried fruit. Limit coconut.  Vegetables  Vegetables cooked in cheese, cream, or butter sauce. Fried vegetables.  Grains  Breads that are made with saturated or trans fats, oils, or whole milk. Croissants. Sweet rolls. Donuts. High-fat crackers, such as cheese crackers.  Meats and other proteins  Fatty meats, such as hot dogs, ribs, sausage, miles, rib-eye roast or steak. High-fat deli meats, such as salami and bologna. Caviar. Domestic duck and goose. Organ meats, such as liver.  Dairy  Cream, sour cream, cream cheese, and creamed cottage cheese. Whole-milk cheeses. Whole or 2% milk that is liquid, evaporated, or condensed. Whole buttermilk. Cream sauce or high-fat cheese sauce. Yogurt that is made from whole milk.  Fats and oils  Meat fat, or shortening. Cocoa butter, hydrogenated oils, palm oil, coconut oil, palm kernel oil. Solid fats and shortenings, including miles fat, salt pork, lard, and butter. Nondairy cream substitutes. Salad dressings with cheese or sour cream.  Beverages  Regular sodas and juice drinks with added sugar.  Sweets and desserts  Frosting. Pudding. Cookies. Cakes. Pies. Milk chocolate or white chocolate. Buttered syrups. Full-fat ice cream or ice cream drinks.  The items listed above may not be a complete list of foods and drinks to avoid. Contact a dietitian for more information.  Summary  · Heart-healthy meal planning includes eating less unhealthy fats, eating more healthy fats, and making other changes in your diet.  · Eat a balanced diet. This includes fruits and vegetables, low-fat  or nonfat dairy, lean protein, nuts and legumes, whole grains, and heart-healthy oils and fats.  This information is not intended to replace advice given to you by your health care provider. Make sure you discuss any questions you have with your health care provider.  Document Released: 06/18/2013 Document Revised: 01/25/2019 Document Reviewed: 01/25/2019  [employer] Interactive Patient Education © 2019 [employer]    Cholesterol    Cholesterol is a fat. Your body needs a small amount of cholesterol. Cholesterol (plaque) may build up in your blood vessels (arteries). That makes you more likely to have a heart attack or stroke.  You cannot feel your cholesterol level. Having a blood test is the only way to find out if your level is high. Keep your test results. Work with your doctor to keep your cholesterol at a good level.  What do the results mean?  · Total cholesterol is how much cholesterol is in your blood.  · LDL is bad cholesterol. This is the type that can build up. Try to have low LDL.  · HDL is good cholesterol. It cleans your blood vessels and carries LDL away. Try to have high HDL.  · Triglycerides are fat that the body can store or burn for energy.  What are good levels of cholesterol?  · Total cholesterol below 200.  · LDL below 100 is good for people who have health risks. LDL below 70 is good for people who have very high risks.  · HDL above 40 is good. It is best to have HDL of 60 or higher.  · Triglycerides below 150.  How can I lower my cholesterol?  Diet  Follow your diet program as told by your doctor.  · Choose fish, white meat chicken, or turkey that is roasted or baked. Try not to eat red meat, fried foods, sausage, or lunch meats.  · Eat lots of fresh fruits and vegetables.  · Choose whole grains, beans, pasta, potatoes, and cereals.  · Choose olive oil, corn oil, or canola oil. Only use small amounts.  · Try not to eat butter, mayonnaise, shortening, or palm kernel oils.  · Try not to eat  foods with trans fats.  · Choose low-fat or nonfat dairy foods.  ? Drink skim or nonfat milk.  ? Eat low-fat or nonfat yogurt and cheeses.  ? Try not to drink whole milk or cream.  ? Try not to eat ice cream, egg yolks, or full-fat cheeses.  · Healthy desserts include su food cake, luis fernando snaps, animal crackers, hard candy, popsicles, and low-fat or nonfat frozen yogurt. Try not to eat pastries, cakes, pies, and cookies.    Exercise  Follow your exercise program as told by your doctor.  · Be more active. Try gardening, walking, and taking the stairs.  · Ask your doctor about ways that you can be more active.  Medicine  · Take over-the-counter and prescription medicines only as told by your doctor.  This information is not intended to replace advice given to you by your health care provider. Make sure you discuss any questions you have with your health care provider.  Document Released: 03/16/2010 Document Revised: 07/19/2017 Document Reviewed: 06/29/2017  [employer] Interactive Patient Education © 2019 [employer]

## 2019-08-09 ENCOUNTER — TREATMENT (OUTPATIENT)
Dept: CARDIAC REHAB | Facility: HOSPITAL | Age: 79
End: 2019-08-09

## 2019-08-09 VITALS — SYSTOLIC BLOOD PRESSURE: 120 MMHG | HEART RATE: 72 BPM | OXYGEN SATURATION: 98 % | DIASTOLIC BLOOD PRESSURE: 70 MMHG

## 2019-08-09 DIAGNOSIS — Z98.61 S/P CORONARY ANGIOPLASTY: ICD-10-CM

## 2019-08-09 DIAGNOSIS — I25.810 CORONARY ARTERY DISEASE INVOLVING AUTOLOGOUS ARTERY CORONARY BYPASS GRAFT WITHOUT ANGINA PECTORIS: Primary | ICD-10-CM

## 2019-08-09 NOTE — PROGRESS NOTES
Pt attended phase III session as scheduled.  Dr. Robert physician immediately available. NAD note, skin warm, pink and dry, denies chest pain, chest pressure , SOA, tolerated exercise well. V/S WIDL See exercise flow  for exercise data     Patient attended first day of exercise. Educated on warm up, cool down, cleaning and use of equipment, signs and symptoms to report, Cardiac Rehab Protocol, Applying the heart monitor.

## 2019-08-12 ENCOUNTER — APPOINTMENT (OUTPATIENT)
Dept: CARDIAC REHAB | Facility: HOSPITAL | Age: 79
End: 2019-08-12

## 2019-08-14 ENCOUNTER — TREATMENT (OUTPATIENT)
Dept: CARDIAC REHAB | Facility: HOSPITAL | Age: 79
End: 2019-08-14

## 2019-08-14 VITALS — DIASTOLIC BLOOD PRESSURE: 72 MMHG | OXYGEN SATURATION: 98 % | HEART RATE: 68 BPM | SYSTOLIC BLOOD PRESSURE: 128 MMHG

## 2019-08-14 DIAGNOSIS — Z98.61 S/P CORONARY ANGIOPLASTY: ICD-10-CM

## 2019-08-14 DIAGNOSIS — I25.810 CORONARY ARTERY DISEASE INVOLVING AUTOLOGOUS ARTERY CORONARY BYPASS GRAFT WITHOUT ANGINA PECTORIS: Primary | ICD-10-CM

## 2019-08-14 NOTE — PROGRESS NOTES
Pt attended phase III session as scheduled.  Dr. Robert physician immediately available. NAD note, skin warm, pink and dry, denies chest pain, chest pressure , SOA, tolerated exercise well. V/S WIDL See exercise flow  for exercise data

## 2019-08-16 ENCOUNTER — TREATMENT (OUTPATIENT)
Dept: CARDIAC REHAB | Facility: HOSPITAL | Age: 79
End: 2019-08-16

## 2019-08-16 VITALS — DIASTOLIC BLOOD PRESSURE: 80 MMHG | SYSTOLIC BLOOD PRESSURE: 130 MMHG | HEART RATE: 72 BPM | OXYGEN SATURATION: 98 %

## 2019-08-16 DIAGNOSIS — Z98.61 S/P CORONARY ANGIOPLASTY: ICD-10-CM

## 2019-08-16 DIAGNOSIS — I25.810 CORONARY ARTERY DISEASE INVOLVING AUTOLOGOUS ARTERY CORONARY BYPASS GRAFT WITHOUT ANGINA PECTORIS: Primary | ICD-10-CM

## 2019-08-16 NOTE — PROGRESS NOTES
Pt attended phase III session as scheduled.  Dr. Porter physician immediately available. NAD note, skin warm, pink and dry, denies chest pain, chest pressure , SOA, tolerated exercise well. V/S WIDL See exercise flow  for exercise data

## 2019-08-19 ENCOUNTER — TREATMENT (OUTPATIENT)
Dept: CARDIAC REHAB | Facility: HOSPITAL | Age: 79
End: 2019-08-19

## 2019-08-19 VITALS — SYSTOLIC BLOOD PRESSURE: 122 MMHG | OXYGEN SATURATION: 98 % | DIASTOLIC BLOOD PRESSURE: 72 MMHG | HEART RATE: 78 BPM

## 2019-08-19 DIAGNOSIS — I25.810 CORONARY ARTERY DISEASE INVOLVING AUTOLOGOUS ARTERY CORONARY BYPASS GRAFT WITHOUT ANGINA PECTORIS: Primary | ICD-10-CM

## 2019-08-19 DIAGNOSIS — Z98.61 S/P CORONARY ANGIOPLASTY: ICD-10-CM

## 2019-08-21 ENCOUNTER — APPOINTMENT (OUTPATIENT)
Dept: CARDIAC REHAB | Facility: HOSPITAL | Age: 79
End: 2019-08-21

## 2019-08-22 ENCOUNTER — APPOINTMENT (OUTPATIENT)
Dept: CARDIAC REHAB | Facility: HOSPITAL | Age: 79
End: 2019-08-22

## 2019-08-23 ENCOUNTER — APPOINTMENT (OUTPATIENT)
Dept: CARDIAC REHAB | Facility: HOSPITAL | Age: 79
End: 2019-08-23

## 2019-08-26 ENCOUNTER — TREATMENT (OUTPATIENT)
Dept: CARDIAC REHAB | Facility: HOSPITAL | Age: 79
End: 2019-08-26

## 2019-08-26 VITALS — HEART RATE: 62 BPM | DIASTOLIC BLOOD PRESSURE: 74 MMHG | SYSTOLIC BLOOD PRESSURE: 120 MMHG | OXYGEN SATURATION: 98 %

## 2019-08-26 DIAGNOSIS — Z98.61 S/P CORONARY ANGIOPLASTY: ICD-10-CM

## 2019-08-26 DIAGNOSIS — I25.810 CORONARY ARTERY DISEASE INVOLVING AUTOLOGOUS ARTERY CORONARY BYPASS GRAFT WITHOUT ANGINA PECTORIS: Primary | ICD-10-CM

## 2019-08-26 NOTE — PROGRESS NOTES
Pt attended phase III session as scheduled.    physician immediately available. NAD note, skin warm, pink and dry, denies chest pain, chest pressure , SOA, tolerated exercise well. V/S WIDL See exercise flow  for exercise data

## 2019-08-28 ENCOUNTER — TREATMENT (OUTPATIENT)
Dept: CARDIAC REHAB | Facility: HOSPITAL | Age: 79
End: 2019-08-28

## 2019-08-28 VITALS — DIASTOLIC BLOOD PRESSURE: 82 MMHG | HEART RATE: 78 BPM | OXYGEN SATURATION: 98 % | SYSTOLIC BLOOD PRESSURE: 130 MMHG

## 2019-08-28 DIAGNOSIS — I25.810 CORONARY ARTERY DISEASE INVOLVING AUTOLOGOUS ARTERY CORONARY BYPASS GRAFT WITHOUT ANGINA PECTORIS: Primary | ICD-10-CM

## 2019-08-28 DIAGNOSIS — Z98.61 S/P CORONARY ANGIOPLASTY: ICD-10-CM

## 2019-08-30 ENCOUNTER — TREATMENT (OUTPATIENT)
Dept: CARDIAC REHAB | Facility: HOSPITAL | Age: 79
End: 2019-08-30

## 2019-08-30 VITALS — DIASTOLIC BLOOD PRESSURE: 76 MMHG | HEART RATE: 69 BPM | SYSTOLIC BLOOD PRESSURE: 142 MMHG

## 2019-08-30 DIAGNOSIS — I25.810 CORONARY ARTERY DISEASE INVOLVING AUTOLOGOUS ARTERY CORONARY BYPASS GRAFT WITHOUT ANGINA PECTORIS: Primary | ICD-10-CM

## 2019-08-30 DIAGNOSIS — Z98.61 S/P CORONARY ANGIOPLASTY: ICD-10-CM

## 2019-09-04 ENCOUNTER — APPOINTMENT (OUTPATIENT)
Dept: CARDIAC REHAB | Facility: HOSPITAL | Age: 79
End: 2019-09-04

## 2019-09-06 ENCOUNTER — APPOINTMENT (OUTPATIENT)
Dept: CARDIAC REHAB | Facility: HOSPITAL | Age: 79
End: 2019-09-06

## 2019-09-09 ENCOUNTER — APPOINTMENT (OUTPATIENT)
Dept: CARDIAC REHAB | Facility: HOSPITAL | Age: 79
End: 2019-09-09

## 2019-09-11 ENCOUNTER — APPOINTMENT (OUTPATIENT)
Dept: CARDIAC REHAB | Facility: HOSPITAL | Age: 79
End: 2019-09-11

## 2019-09-13 ENCOUNTER — APPOINTMENT (OUTPATIENT)
Dept: CARDIAC REHAB | Facility: HOSPITAL | Age: 79
End: 2019-09-13

## 2019-09-16 ENCOUNTER — APPOINTMENT (OUTPATIENT)
Dept: CARDIAC REHAB | Facility: HOSPITAL | Age: 79
End: 2019-09-16

## 2019-09-18 ENCOUNTER — APPOINTMENT (OUTPATIENT)
Dept: CARDIAC REHAB | Facility: HOSPITAL | Age: 79
End: 2019-09-18

## 2019-09-20 ENCOUNTER — APPOINTMENT (OUTPATIENT)
Dept: CARDIAC REHAB | Facility: HOSPITAL | Age: 79
End: 2019-09-20

## 2019-09-23 ENCOUNTER — APPOINTMENT (OUTPATIENT)
Dept: CARDIAC REHAB | Facility: HOSPITAL | Age: 79
End: 2019-09-23

## 2019-09-25 ENCOUNTER — APPOINTMENT (OUTPATIENT)
Dept: CARDIAC REHAB | Facility: HOSPITAL | Age: 79
End: 2019-09-25

## 2019-09-27 ENCOUNTER — APPOINTMENT (OUTPATIENT)
Dept: CARDIAC REHAB | Facility: HOSPITAL | Age: 79
End: 2019-09-27

## 2019-09-30 ENCOUNTER — APPOINTMENT (OUTPATIENT)
Dept: CARDIAC REHAB | Facility: HOSPITAL | Age: 79
End: 2019-09-30

## 2019-10-02 ENCOUNTER — APPOINTMENT (OUTPATIENT)
Dept: CARDIAC REHAB | Facility: HOSPITAL | Age: 79
End: 2019-10-02

## 2019-10-04 ENCOUNTER — APPOINTMENT (OUTPATIENT)
Dept: CARDIAC REHAB | Facility: HOSPITAL | Age: 79
End: 2019-10-04

## 2019-10-07 ENCOUNTER — APPOINTMENT (OUTPATIENT)
Dept: CARDIAC REHAB | Facility: HOSPITAL | Age: 79
End: 2019-10-07

## 2019-10-07 ENCOUNTER — TRANSCRIBE ORDERS (OUTPATIENT)
Dept: ADMINISTRATIVE | Facility: HOSPITAL | Age: 79
End: 2019-10-07

## 2019-10-07 DIAGNOSIS — E04.1 THYROID NODULE: Primary | ICD-10-CM

## 2019-10-09 ENCOUNTER — APPOINTMENT (OUTPATIENT)
Dept: CARDIAC REHAB | Facility: HOSPITAL | Age: 79
End: 2019-10-09

## 2019-10-11 ENCOUNTER — APPOINTMENT (OUTPATIENT)
Dept: CARDIAC REHAB | Facility: HOSPITAL | Age: 79
End: 2019-10-11

## 2019-10-14 ENCOUNTER — APPOINTMENT (OUTPATIENT)
Dept: ULTRASOUND IMAGING | Facility: HOSPITAL | Age: 79
End: 2019-10-14

## 2019-10-14 ENCOUNTER — APPOINTMENT (OUTPATIENT)
Dept: CARDIAC REHAB | Facility: HOSPITAL | Age: 79
End: 2019-10-14

## 2019-10-15 ENCOUNTER — APPOINTMENT (OUTPATIENT)
Dept: BONE DENSITY | Facility: HOSPITAL | Age: 79
End: 2019-10-15

## 2019-10-16 ENCOUNTER — HOSPITAL ENCOUNTER (OUTPATIENT)
Dept: ULTRASOUND IMAGING | Facility: HOSPITAL | Age: 79
Discharge: HOME | End: 2019-10-16
Admitting: INTERNAL MEDICINE

## 2019-10-16 ENCOUNTER — APPOINTMENT (OUTPATIENT)
Dept: CARDIAC REHAB | Facility: HOSPITAL | Age: 79
End: 2019-10-16

## 2019-10-16 DIAGNOSIS — E04.1 THYROID NODULE: ICD-10-CM

## 2019-10-16 PROCEDURE — 76536 US EXAM OF HEAD AND NECK: CPT | Performed by: RADIOLOGY

## 2019-10-16 PROCEDURE — 76536 US EXAM OF HEAD AND NECK: CPT

## 2019-10-18 ENCOUNTER — APPOINTMENT (OUTPATIENT)
Dept: CARDIAC REHAB | Facility: HOSPITAL | Age: 79
End: 2019-10-18

## 2019-10-21 ENCOUNTER — APPOINTMENT (OUTPATIENT)
Dept: CARDIAC REHAB | Facility: HOSPITAL | Age: 79
End: 2019-10-21

## 2019-10-23 ENCOUNTER — APPOINTMENT (OUTPATIENT)
Dept: CARDIAC REHAB | Facility: HOSPITAL | Age: 79
End: 2019-10-23

## 2019-10-23 ENCOUNTER — TREATMENT (OUTPATIENT)
Dept: CARDIAC REHAB | Facility: HOSPITAL | Age: 79
End: 2019-10-23

## 2019-10-23 VITALS — DIASTOLIC BLOOD PRESSURE: 76 MMHG | OXYGEN SATURATION: 98 % | SYSTOLIC BLOOD PRESSURE: 128 MMHG | HEART RATE: 69 BPM

## 2019-10-23 DIAGNOSIS — I25.810 CORONARY ARTERY DISEASE INVOLVING AUTOLOGOUS ARTERY CORONARY BYPASS GRAFT WITHOUT ANGINA PECTORIS: ICD-10-CM

## 2019-10-23 DIAGNOSIS — Z98.61 S/P CORONARY ANGIOPLASTY: Primary | ICD-10-CM

## 2019-10-23 NOTE — PROGRESS NOTES
Pt attended phase III session as scheduled.    physician immediately available. NAD note, skin warm, pink and dry, denies chest pain, chest pressure , SOA, tolerated exercise well. V/S WIDL See exercise flow  for exercise data     Patient is returning after being off due to chest soreness. She stated Dr Gutierrez said she can come back but not to use her arms until he does a CT of the chest. She stated she feels fine today she has thought she over done it working out side and got her chest hurting were she was a S/P CABG.

## 2019-10-25 ENCOUNTER — APPOINTMENT (OUTPATIENT)
Dept: CARDIAC REHAB | Facility: HOSPITAL | Age: 79
End: 2019-10-25

## 2019-10-25 ENCOUNTER — TREATMENT (OUTPATIENT)
Dept: CARDIAC REHAB | Facility: HOSPITAL | Age: 79
End: 2019-10-25

## 2019-10-25 VITALS — DIASTOLIC BLOOD PRESSURE: 82 MMHG | OXYGEN SATURATION: 97 % | SYSTOLIC BLOOD PRESSURE: 136 MMHG | HEART RATE: 67 BPM

## 2019-10-25 DIAGNOSIS — Z98.61 S/P CORONARY ANGIOPLASTY: Primary | ICD-10-CM

## 2019-10-25 DIAGNOSIS — I25.810 CORONARY ARTERY DISEASE INVOLVING AUTOLOGOUS ARTERY CORONARY BYPASS GRAFT WITHOUT ANGINA PECTORIS: ICD-10-CM

## 2019-10-28 ENCOUNTER — APPOINTMENT (OUTPATIENT)
Dept: CARDIAC REHAB | Facility: HOSPITAL | Age: 79
End: 2019-10-28

## 2019-10-28 ENCOUNTER — TREATMENT (OUTPATIENT)
Dept: CARDIAC REHAB | Facility: HOSPITAL | Age: 79
End: 2019-10-28

## 2019-10-28 VITALS — HEART RATE: 68 BPM | DIASTOLIC BLOOD PRESSURE: 70 MMHG | SYSTOLIC BLOOD PRESSURE: 156 MMHG

## 2019-10-28 DIAGNOSIS — Z98.61 S/P CORONARY ANGIOPLASTY: Primary | ICD-10-CM

## 2019-10-28 DIAGNOSIS — I25.810 CORONARY ARTERY DISEASE INVOLVING AUTOLOGOUS ARTERY CORONARY BYPASS GRAFT WITHOUT ANGINA PECTORIS: ICD-10-CM

## 2019-10-30 ENCOUNTER — APPOINTMENT (OUTPATIENT)
Dept: CARDIAC REHAB | Facility: HOSPITAL | Age: 79
End: 2019-10-30

## 2019-11-01 ENCOUNTER — APPOINTMENT (OUTPATIENT)
Dept: CARDIAC REHAB | Facility: HOSPITAL | Age: 79
End: 2019-11-01

## 2019-11-04 ENCOUNTER — APPOINTMENT (OUTPATIENT)
Dept: CARDIAC REHAB | Facility: HOSPITAL | Age: 79
End: 2019-11-04

## 2019-11-04 ENCOUNTER — TREATMENT (OUTPATIENT)
Dept: CARDIAC REHAB | Facility: HOSPITAL | Age: 79
End: 2019-11-04

## 2019-11-04 VITALS — DIASTOLIC BLOOD PRESSURE: 70 MMHG | SYSTOLIC BLOOD PRESSURE: 128 MMHG | HEART RATE: 65 BPM

## 2019-11-04 DIAGNOSIS — I25.810 CORONARY ARTERY DISEASE INVOLVING AUTOLOGOUS ARTERY CORONARY BYPASS GRAFT WITHOUT ANGINA PECTORIS: ICD-10-CM

## 2019-11-04 DIAGNOSIS — Z98.61 S/P CORONARY ANGIOPLASTY: Primary | ICD-10-CM

## 2019-11-06 ENCOUNTER — APPOINTMENT (OUTPATIENT)
Dept: CARDIAC REHAB | Facility: HOSPITAL | Age: 79
End: 2019-11-06

## 2019-11-06 ENCOUNTER — TREATMENT (OUTPATIENT)
Dept: CARDIAC REHAB | Facility: HOSPITAL | Age: 79
End: 2019-11-06

## 2019-11-06 VITALS — SYSTOLIC BLOOD PRESSURE: 142 MMHG | OXYGEN SATURATION: 98 % | DIASTOLIC BLOOD PRESSURE: 70 MMHG | HEART RATE: 63 BPM

## 2019-11-06 DIAGNOSIS — I25.810 CORONARY ARTERY DISEASE INVOLVING AUTOLOGOUS ARTERY CORONARY BYPASS GRAFT WITHOUT ANGINA PECTORIS: ICD-10-CM

## 2019-11-06 DIAGNOSIS — Z98.61 S/P CORONARY ANGIOPLASTY: Primary | ICD-10-CM

## 2019-11-08 ENCOUNTER — TREATMENT (OUTPATIENT)
Dept: CARDIAC REHAB | Facility: HOSPITAL | Age: 79
End: 2019-11-08

## 2019-11-08 ENCOUNTER — APPOINTMENT (OUTPATIENT)
Dept: CARDIAC REHAB | Facility: HOSPITAL | Age: 79
End: 2019-11-08

## 2019-11-08 VITALS — DIASTOLIC BLOOD PRESSURE: 70 MMHG | HEART RATE: 68 BPM | OXYGEN SATURATION: 98 % | SYSTOLIC BLOOD PRESSURE: 140 MMHG

## 2019-11-08 DIAGNOSIS — Z98.61 S/P CORONARY ANGIOPLASTY: Primary | ICD-10-CM

## 2019-11-08 DIAGNOSIS — I25.810 CORONARY ARTERY DISEASE INVOLVING AUTOLOGOUS ARTERY CORONARY BYPASS GRAFT WITHOUT ANGINA PECTORIS: ICD-10-CM

## 2019-11-11 ENCOUNTER — APPOINTMENT (OUTPATIENT)
Dept: CARDIAC REHAB | Facility: HOSPITAL | Age: 79
End: 2019-11-11

## 2019-11-11 ENCOUNTER — TRANSCRIBE ORDERS (OUTPATIENT)
Dept: ADMINISTRATIVE | Facility: HOSPITAL | Age: 79
End: 2019-11-11

## 2019-11-11 DIAGNOSIS — R07.82 INTERCOSTAL PAIN: Primary | ICD-10-CM

## 2019-11-13 ENCOUNTER — APPOINTMENT (OUTPATIENT)
Dept: CARDIAC REHAB | Facility: HOSPITAL | Age: 79
End: 2019-11-13

## 2019-11-15 ENCOUNTER — APPOINTMENT (OUTPATIENT)
Dept: CARDIAC REHAB | Facility: HOSPITAL | Age: 79
End: 2019-11-15

## 2019-11-18 ENCOUNTER — APPOINTMENT (OUTPATIENT)
Dept: CARDIAC REHAB | Facility: HOSPITAL | Age: 79
End: 2019-11-18

## 2019-11-18 ENCOUNTER — HOSPITAL ENCOUNTER (OUTPATIENT)
Dept: CT IMAGING | Facility: HOSPITAL | Age: 79
Discharge: HOME | End: 2019-11-18
Admitting: INTERNAL MEDICINE

## 2019-11-18 DIAGNOSIS — R07.82 INTERCOSTAL PAIN: ICD-10-CM

## 2019-11-18 PROCEDURE — 71250 CT THORAX DX C-: CPT | Performed by: RADIOLOGY

## 2019-11-18 PROCEDURE — 71250 CT THORAX DX C-: CPT

## 2019-11-20 ENCOUNTER — APPOINTMENT (OUTPATIENT)
Dept: CARDIAC REHAB | Facility: HOSPITAL | Age: 79
End: 2019-11-20

## 2019-11-22 ENCOUNTER — APPOINTMENT (OUTPATIENT)
Dept: CARDIAC REHAB | Facility: HOSPITAL | Age: 79
End: 2019-11-22

## 2019-11-25 ENCOUNTER — APPOINTMENT (OUTPATIENT)
Dept: CARDIAC REHAB | Facility: HOSPITAL | Age: 79
End: 2019-11-25

## 2019-11-27 ENCOUNTER — APPOINTMENT (OUTPATIENT)
Dept: CARDIAC REHAB | Facility: HOSPITAL | Age: 79
End: 2019-11-27

## 2019-11-29 ENCOUNTER — APPOINTMENT (OUTPATIENT)
Dept: CARDIAC REHAB | Facility: HOSPITAL | Age: 79
End: 2019-11-29

## 2019-12-02 ENCOUNTER — APPOINTMENT (OUTPATIENT)
Dept: CARDIAC REHAB | Facility: HOSPITAL | Age: 79
End: 2019-12-02

## 2019-12-04 ENCOUNTER — APPOINTMENT (OUTPATIENT)
Dept: CARDIAC REHAB | Facility: HOSPITAL | Age: 79
End: 2019-12-04

## 2019-12-06 ENCOUNTER — APPOINTMENT (OUTPATIENT)
Dept: CARDIAC REHAB | Facility: HOSPITAL | Age: 79
End: 2019-12-06

## 2020-08-18 ENCOUNTER — TRANSCRIBE ORDERS (OUTPATIENT)
Dept: ADMINISTRATIVE | Facility: HOSPITAL | Age: 80
End: 2020-08-18

## 2020-08-18 ENCOUNTER — HOSPITAL ENCOUNTER (OUTPATIENT)
Dept: GENERAL RADIOLOGY | Facility: HOSPITAL | Age: 80
Discharge: HOME | End: 2020-08-18
Admitting: INTERNAL MEDICINE

## 2020-08-18 DIAGNOSIS — M25.531 RIGHT WRIST PAIN: ICD-10-CM

## 2020-08-18 DIAGNOSIS — M79.641 RIGHT HAND PAIN: ICD-10-CM

## 2020-08-18 DIAGNOSIS — M79.641 RIGHT HAND PAIN: Primary | ICD-10-CM

## 2020-08-18 PROCEDURE — 73110 X-RAY EXAM OF WRIST: CPT | Performed by: RADIOLOGY

## 2020-08-18 PROCEDURE — 73130 X-RAY EXAM OF HAND: CPT

## 2020-08-18 PROCEDURE — 73110 X-RAY EXAM OF WRIST: CPT

## 2020-08-18 PROCEDURE — 73130 X-RAY EXAM OF HAND: CPT | Performed by: RADIOLOGY

## 2020-11-16 ENCOUNTER — TREATMENT (OUTPATIENT)
Dept: PHYSICAL THERAPY | Facility: CLINIC | Age: 80
End: 2020-11-16

## 2020-11-16 DIAGNOSIS — M18.11 OSTEOARTHRITIS OF CARPOMETACARPAL (CMC) JOINT OF RIGHT THUMB, UNSPECIFIED OSTEOARTHRITIS TYPE: Primary | ICD-10-CM

## 2020-11-16 PROCEDURE — A9999 DME SUPPLY OR ACCESSORY, NOS: HCPCS | Performed by: PHYSICAL THERAPIST

## 2020-11-17 ENCOUNTER — TRANSCRIBE ORDERS (OUTPATIENT)
Dept: PHYSICAL THERAPY | Facility: CLINIC | Age: 80
End: 2020-11-17

## 2020-11-17 DIAGNOSIS — M18.11 OSTEOARTHRITIS OF CARPOMETACARPAL (CMC) JOINT OF RIGHT THUMB, UNSPECIFIED OSTEOARTHRITIS TYPE: Primary | ICD-10-CM

## 2020-11-17 NOTE — PROGRESS NOTES
Juliet experiencing right CMC joint pain following fall out of swing and landing on right hand on 8/14/20. Had injection 11/16/20.   Patient fitted with pre-fabricated right M+ comfort cool. Patient demonstrated understanding of donning/doffing and verbalized understanding of wear times and precautions.

## 2020-12-02 ENCOUNTER — TREATMENT (OUTPATIENT)
Dept: PHYSICAL THERAPY | Facility: CLINIC | Age: 80
End: 2020-12-02

## 2020-12-02 DIAGNOSIS — M18.11 OSTEOARTHRITIS OF CARPOMETACARPAL (CMC) JOINT OF RIGHT THUMB, UNSPECIFIED OSTEOARTHRITIS TYPE: Primary | ICD-10-CM

## 2020-12-02 PROCEDURE — A9999 DME SUPPLY OR ACCESSORY, NOS: HCPCS | Performed by: PHYSICAL THERAPIST

## 2020-12-04 NOTE — PROGRESS NOTES
Patient fitted for prefabricated comfort cool for right CMC joint arthritis. Patient educated on donning/doffing and wear times. Patient requested additional splint due to wear and tear on previous one through holiday.

## 2020-12-08 ENCOUNTER — HOSPITAL ENCOUNTER (EMERGENCY)
Facility: HOSPITAL | Age: 80
Discharge: HOME | End: 2020-12-09
Attending: EMERGENCY MEDICINE | Admitting: EMERGENCY MEDICINE

## 2020-12-08 ENCOUNTER — APPOINTMENT (OUTPATIENT)
Dept: GENERAL RADIOLOGY | Facility: HOSPITAL | Age: 80
End: 2020-12-08

## 2020-12-08 DIAGNOSIS — Z86.79 HISTORY OF CORONARY ARTERY DISEASE: ICD-10-CM

## 2020-12-08 DIAGNOSIS — Z95.5 HISTORY OF CORONARY ARTERY STENT PLACEMENT: ICD-10-CM

## 2020-12-08 DIAGNOSIS — R07.9 CHEST PAIN, UNSPECIFIED TYPE: Primary | ICD-10-CM

## 2020-12-08 LAB
ALBUMIN SERPL-MCNC: 3.9 G/DL (ref 3.5–5.2)
ALBUMIN/GLOB SERPL: 1.3 G/DL
ALP SERPL-CCNC: 80 U/L (ref 39–117)
ALT SERPL W P-5'-P-CCNC: 17 U/L (ref 1–33)
ANION GAP SERPL CALCULATED.3IONS-SCNC: 8.5 MMOL/L (ref 5–15)
AST SERPL-CCNC: 23 U/L (ref 1–32)
BASOPHILS # BLD AUTO: 0.03 10*3/MM3 (ref 0–0.2)
BASOPHILS NFR BLD AUTO: 0.6 % (ref 0–1.5)
BILIRUB SERPL-MCNC: 0.5 MG/DL (ref 0–1.2)
BUN SERPL-MCNC: 17 MG/DL (ref 8–23)
BUN/CREAT SERPL: 17 (ref 7–25)
CALCIUM SPEC-SCNC: 9.4 MG/DL (ref 8.6–10.5)
CHLORIDE SERPL-SCNC: 106 MMOL/L (ref 98–107)
CO2 SERPL-SCNC: 23.5 MMOL/L (ref 22–29)
CREAT SERPL-MCNC: 1 MG/DL (ref 0.57–1)
DEPRECATED RDW RBC AUTO: 45.9 FL (ref 37–54)
EOSINOPHIL # BLD AUTO: 0.03 10*3/MM3 (ref 0–0.4)
EOSINOPHIL NFR BLD AUTO: 0.6 % (ref 0.3–6.2)
ERYTHROCYTE [DISTWIDTH] IN BLOOD BY AUTOMATED COUNT: 13.1 % (ref 12.3–15.4)
GFR SERPL CREATININE-BSD FRML MDRD: 53 ML/MIN/1.73
GLOBULIN UR ELPH-MCNC: 3 GM/DL
GLUCOSE SERPL-MCNC: 101 MG/DL (ref 65–99)
HCT VFR BLD AUTO: 45.8 % (ref 34–46.6)
HGB BLD-MCNC: 15 G/DL (ref 12–15.9)
HYPOCHROMIA BLD QL: NORMAL
IMM GRANULOCYTES # BLD AUTO: 0.01 10*3/MM3 (ref 0–0.05)
IMM GRANULOCYTES NFR BLD AUTO: 0.2 % (ref 0–0.5)
LARGE PLATELETS: NORMAL
LYMPHOCYTES # BLD AUTO: 1.63 10*3/MM3 (ref 0.7–3.1)
LYMPHOCYTES NFR BLD AUTO: 32 % (ref 19.6–45.3)
MACROCYTES BLD QL SMEAR: NORMAL
MCH RBC QN AUTO: 31 PG (ref 26.6–33)
MCHC RBC AUTO-ENTMCNC: 32.8 G/DL (ref 31.5–35.7)
MCV RBC AUTO: 94.6 FL (ref 79–97)
MONOCYTES # BLD AUTO: 0.46 10*3/MM3 (ref 0.1–0.9)
MONOCYTES NFR BLD AUTO: 9 % (ref 5–12)
NEUTROPHILS NFR BLD AUTO: 2.93 10*3/MM3 (ref 1.7–7)
NEUTROPHILS NFR BLD AUTO: 57.6 % (ref 42.7–76)
NRBC BLD AUTO-RTO: 0 /100 WBC (ref 0–0.2)
NT-PROBNP SERPL-MCNC: 531.4 PG/ML (ref 0–1800)
PLATELET # BLD AUTO: 134 10*3/MM3 (ref 140–450)
PMV BLD AUTO: 9.3 FL (ref 6–12)
POTASSIUM SERPL-SCNC: 4.5 MMOL/L (ref 3.5–5.2)
PROT SERPL-MCNC: 6.9 G/DL (ref 6–8.5)
QT INTERVAL: 424 MS
QTC INTERVAL: 460 MS
RBC # BLD AUTO: 4.84 10*6/MM3 (ref 3.77–5.28)
SODIUM SERPL-SCNC: 138 MMOL/L (ref 136–145)
TROPONIN T SERPL-MCNC: <0.01 NG/ML (ref 0–0.03)
TROPONIN T SERPL-MCNC: <0.01 NG/ML (ref 0–0.03)
WBC # BLD AUTO: 5.09 10*3/MM3 (ref 3.4–10.8)

## 2020-12-08 PROCEDURE — 85025 COMPLETE CBC W/AUTO DIFF WBC: CPT | Performed by: PHYSICIAN ASSISTANT

## 2020-12-08 PROCEDURE — 80053 COMPREHEN METABOLIC PANEL: CPT | Performed by: PHYSICIAN ASSISTANT

## 2020-12-08 PROCEDURE — 84484 ASSAY OF TROPONIN QUANT: CPT | Performed by: PHYSICIAN ASSISTANT

## 2020-12-08 PROCEDURE — 93005 ELECTROCARDIOGRAM TRACING: CPT | Performed by: EMERGENCY MEDICINE

## 2020-12-08 PROCEDURE — 71045 X-RAY EXAM CHEST 1 VIEW: CPT | Performed by: RADIOLOGY

## 2020-12-08 PROCEDURE — 93010 ELECTROCARDIOGRAM REPORT: CPT | Performed by: INTERNAL MEDICINE

## 2020-12-08 PROCEDURE — 85007 BL SMEAR W/DIFF WBC COUNT: CPT | Performed by: PHYSICIAN ASSISTANT

## 2020-12-08 PROCEDURE — 96360 HYDRATION IV INFUSION INIT: CPT

## 2020-12-08 PROCEDURE — 83880 ASSAY OF NATRIURETIC PEPTIDE: CPT | Performed by: PHYSICIAN ASSISTANT

## 2020-12-08 PROCEDURE — 99285 EMERGENCY DEPT VISIT HI MDM: CPT

## 2020-12-08 PROCEDURE — 96361 HYDRATE IV INFUSION ADD-ON: CPT

## 2020-12-08 PROCEDURE — 71045 X-RAY EXAM CHEST 1 VIEW: CPT

## 2020-12-08 RX ORDER — HYDROCODONE BITARTRATE AND ACETAMINOPHEN 5; 325 MG/1; MG/1
1 TABLET ORAL ONCE
Status: COMPLETED | OUTPATIENT
Start: 2020-12-08 | End: 2020-12-08

## 2020-12-08 RX ORDER — PANTOPRAZOLE SODIUM 40 MG/1
40 TABLET, DELAYED RELEASE ORAL ONCE
Status: COMPLETED | OUTPATIENT
Start: 2020-12-08 | End: 2020-12-08

## 2020-12-08 RX ORDER — ASPIRIN 81 MG/1
81 TABLET, CHEWABLE ORAL ONCE
Status: COMPLETED | OUTPATIENT
Start: 2020-12-08 | End: 2020-12-08

## 2020-12-08 RX ORDER — SODIUM CHLORIDE 0.9 % (FLUSH) 0.9 %
10 SYRINGE (ML) INJECTION AS NEEDED
Status: DISCONTINUED | OUTPATIENT
Start: 2020-12-08 | End: 2020-12-09 | Stop reason: HOSPADM

## 2020-12-08 RX ORDER — CLOPIDOGREL BISULFATE 75 MG/1
75 TABLET ORAL ONCE
Status: COMPLETED | OUTPATIENT
Start: 2020-12-08 | End: 2020-12-08

## 2020-12-08 RX ORDER — SODIUM CHLORIDE 9 MG/ML
125 INJECTION, SOLUTION INTRAVENOUS CONTINUOUS
Status: DISCONTINUED | OUTPATIENT
Start: 2020-12-08 | End: 2020-12-09 | Stop reason: HOSPADM

## 2020-12-08 RX ADMIN — PANTOPRAZOLE SODIUM 40 MG: 40 TABLET, DELAYED RELEASE ORAL at 21:52

## 2020-12-08 RX ADMIN — HYDROCODONE BITARTRATE AND ACETAMINOPHEN 1 TABLET: 5; 325 TABLET ORAL at 21:52

## 2020-12-08 RX ADMIN — ASPIRIN 81 MG: 81 TABLET, CHEWABLE ORAL at 10:48

## 2020-12-08 RX ADMIN — CLOPIDOGREL 75 MG: 75 TABLET, FILM COATED ORAL at 10:48

## 2020-12-08 RX ADMIN — SODIUM CHLORIDE 125 ML/HR: 9 INJECTION, SOLUTION INTRAVENOUS at 21:52

## 2020-12-08 NOTE — ED PROVIDER NOTES
Subjective   This is a 80 year old female patient who presents to the ER with chief complaint of chest pain. PMH significant for CAD status post CABG 18 years ago and 5 stents with the last one being about 5 years ago. She says she has no history of MI. She endorses HLD, HTN but denies DM and COPD. No oxygen therapy. She takes plavix and aspirin. She says this began 30 minutes PTA. It is midsternal and radiates into there bilateral shoulders as well as has associated numbness in her bilateral jaws. She denies associated SOB, palpitations, nausea and vomiting.           Review of Systems   Constitutional: Negative.  Negative for fever.   HENT: Negative.    Respiratory: Negative.    Cardiovascular: Positive for chest pain. Negative for palpitations and leg swelling.   Gastrointestinal: Negative.  Negative for abdominal pain.   Endocrine: Negative.    Genitourinary: Negative.  Negative for dysuria.   Skin: Negative.    Neurological: Negative.    Psychiatric/Behavioral: Negative.    All other systems reviewed and are negative.      Past Medical History:   Diagnosis Date   • Arthritis    • Coronary artery disease    • GERD (gastroesophageal reflux disease)    • Hyperlipidemia    • Hypertension        Allergies   Allergen Reactions   • Clindamycin/Lincomycin GI Intolerance   • Latex Hives   • Penicillins Rash   • Sulfa Antibiotics Rash       Past Surgical History:   Procedure Laterality Date   • APPENDECTOMY     • CARDIAC CATHETERIZATION     • CARDIAC CATHETERIZATION N/A 3/22/2017    Procedure: Left Heart Cath;  Surgeon: Alex Blanco MD;  Location: [facility] CATH INVASIVE LOC[facility];  Service:    • CARDIAC SURGERY     • CHOLECYSTECTOMY     • CORONARY ARTERY BYPASS GRAFT     • CORONARY STENT PLACEMENT     • HERNIA REPAIR     • HYSTERECTOMY         Family History   Problem Relation Age of Onset   • Heart attack Mother    • Anuerysm Father    • Heart attack Sister    • Heart disease Sister    • Heart disease Brother         Social History     Socioeconomic History   • Marital status:      Spouse name: Not on file   • Number of children: Not on file   • Years of education: Not on file   • Highest education level: Not on file   Tobacco Use   • Smoking status: Never Smoker   Substance and Sexual Activity   • Alcohol use: No   • Drug use: No   • Sexual activity: Defer           Objective   Physical Exam  Vitals signs and nursing note reviewed.   Constitutional:       General: She is not in acute distress.     Appearance: She is well-developed. She is not diaphoretic.   HENT:      Head: Normocephalic and atraumatic.      Right Ear: External ear normal.      Left Ear: External ear normal.      Nose: Nose normal.   Eyes:      Conjunctiva/sclera: Conjunctivae normal.      Pupils: Pupils are equal, round, and reactive to light.   Neck:      Musculoskeletal: Normal range of motion and neck supple.      Vascular: No JVD.      Trachea: No tracheal deviation.   Cardiovascular:      Rate and Rhythm: Normal rate and regular rhythm.      Heart sounds: Normal heart sounds. No murmur.   Pulmonary:      Effort: Pulmonary effort is normal. No respiratory distress.      Breath sounds: Normal breath sounds. No wheezing.   Abdominal:      General: Bowel sounds are normal.      Palpations: Abdomen is soft.      Tenderness: There is no abdominal tenderness.   Musculoskeletal: Normal range of motion.         General: No deformity.   Skin:     General: Skin is warm and dry.      Coloration: Skin is not pale.      Findings: No erythema or rash.   Neurological:      Mental Status: She is alert and oriented to person, place, and time.      Cranial Nerves: No cranial nerve deficit.   Psychiatric:         Behavior: Behavior normal.         Thought Content: Thought content normal.         Procedures           ED Course  ED Course as of Dec 10 1016   Tue Dec 08, 2020   1012 EKG at 1003 shows sinus rhythm with a rate of 71.  RI interval 192, QRS duration  126, QTc 460 ms.  Right bundle branch block.  No apparent acute ischemia, no evidence for STEMI.  Nonspecific ST-T changes.    [CM]   1135 Patient hasn't taken her aspirin or plavix today and doesn't want to take nitro as it makes her BP drop quickly.     [MM]   1147 IMPRESSION:  No evidence of active or acute cardiopulmonary disease on today's chest  radiograph.     This report was finalized on 12/8/2020 11:30 AM by Dr. Joshua Chaudhry MD.   XR Chest 1 View [MM]   1212 I saw this patient with Alisha.  I agree with her plan of care.    [CM]   1225 Spoke with the patient about admission and she would like to be transferred to [facility] where her cardiologist is.     [MM]   1410 Dr[facility].     [MM]   2027 I have personally seen and evaluated this patient.  She is resting comfortably but complains of some abdominal discomfort.  She states that she has acid reflux and requests a Nexium or Protonix.  The patient also states that she is having a little bit of a headache and some mild chest pain at this time.  She requesting [place].  The patient states that she is feeling dehydrated and states that she would like to have some IV fluids.  She has no additional complaints at this time.  Vital signs are currently stable.    [EG]   2223 Sign out give to Dr. Hansen.  Patient is waiting on transfer and bed at [facility]. Patient vitals stable. She is doing well.     [ML]   Wed Dec 09, 2020   0535 Received patient checkout from previous team.  80-year-old female coming in for evaluation of chest pain.  Heart score 6.  Pending transfer to [facility] where her cardiologist is per previous team.  Will repeat troponin.  Given aspirin by previous team.Repeat Trop negative.    [JA]   0655 Handed off to Dr. Shen. Pending transfer to [facility].     [JA]   0811 Care assumed from Dr. Trujillo at shift change.  No complaints at this time.  No chest pain.   Hemodynamically stable.  She has had 3 negative cardiac enzymes.  The patient states that she would rather go home and be transferred to [place].  At this time, there are no beds available.  Have discussed with PA for her cardiologist, Dr. Sanchez.  They agree with outpatient follow-up.  The office will contact Mrs. Batista today to make appointment for the next couple of days.    [BC]      ED Course User Index  [BC] Jason Shen MD  [CM] Andrei Underwood MD  [EG] Nery Hansen DO  [JA] Hood Trujillo MD  [ML] Lupe Krause PA  [MM] Alisha Burnett PA                                         HEART Score (for prediction of 6-week risk of major adverse cardiac event) reviewed and/or performed as part of the patient evaluation and treatment planning process.  The result associated with this review/performance is: 6       MDM  Number of Diagnoses or Management Options  Chest pain, unspecified type:      Amount and/or Complexity of Data Reviewed  Clinical lab tests: ordered and reviewed  Tests in the radiology section of CPT®: ordered and reviewed  Discuss the patient with other providers: yes        Final diagnoses:   Chest pain, unspecified type   History of coronary artery disease   History of coronary artery stent placement            Alisha Burnett PA  12/08/20 1416       Alisha Burnett PA  12/08/20 1534       Alisha Burnett PA  12/10/20 1016

## 2020-12-08 NOTE — ED NOTES
Pt states she started having back pain this morning when she woke up between her shoulders and radiated into her chest generalized. Chest pain is a 7/10. Pt has history of open heart surgery, and stents.     Kavitha aPz RN  12/08/20 1034

## 2020-12-08 NOTE — ED NOTES
Called [facility]. Talked to Moreene. She is going to get a hold of a hosptialist and call back.        Janie Peng  12/08/20 5527

## 2020-12-08 NOTE — ED NOTES
Dr. Michel called back to speak with Alisha. Alisha was in a  Patients room at this time.      Janie Peng  12/08/20 6165

## 2020-12-09 VITALS
SYSTOLIC BLOOD PRESSURE: 166 MMHG | OXYGEN SATURATION: 100 % | RESPIRATION RATE: 16 BRPM | WEIGHT: 165 LBS | HEART RATE: 75 BPM | HEIGHT: 65 IN | DIASTOLIC BLOOD PRESSURE: 77 MMHG | BODY MASS INDEX: 27.49 KG/M2 | TEMPERATURE: 97.9 F

## 2020-12-09 LAB — TROPONIN T SERPL-MCNC: <0.01 NG/ML (ref 0–0.03)

## 2020-12-09 PROCEDURE — 96361 HYDRATE IV INFUSION ADD-ON: CPT

## 2020-12-09 PROCEDURE — 84484 ASSAY OF TROPONIN QUANT: CPT | Performed by: STUDENT IN AN ORGANIZED HEALTH CARE EDUCATION/TRAINING PROGRAM

## 2020-12-09 RX ORDER — HYDROCODONE BITARTRATE AND ACETAMINOPHEN 5; 325 MG/1; MG/1
1 TABLET ORAL ONCE
Status: DISCONTINUED | OUTPATIENT
Start: 2020-12-09 | End: 2020-12-09 | Stop reason: HOSPADM

## 2020-12-09 RX ORDER — TRAMADOL HYDROCHLORIDE 50 MG/1
50 TABLET ORAL ONCE
Status: COMPLETED | OUTPATIENT
Start: 2020-12-09 | End: 2020-12-09

## 2020-12-09 RX ADMIN — TRAMADOL HYDROCHLORIDE 50 MG: 50 TABLET, FILM COATED ORAL at 03:25

## 2020-12-09 NOTE — ED PROVIDER NOTES
Subjective   History of Present Illness    Review of Systems    Past Medical History:   Diagnosis Date   • Arthritis    • Coronary artery disease    • GERD (gastroesophageal reflux disease)    • Hyperlipidemia    • Hypertension        Allergies   Allergen Reactions   • Clindamycin/Lincomycin GI Intolerance   • Latex Hives   • Penicillins Rash   • Sulfa Antibiotics Rash       Past Surgical History:   Procedure Laterality Date   • APPENDECTOMY     • CARDIAC CATHETERIZATION     • CARDIAC CATHETERIZATION N/A 3/22/2017    Procedure: Left Heart Cath;  Surgeon: Alex Blanco MD;  Location: [facility] CATH INVASIVE LOCATION;  Service:    • CARDIAC SURGERY     • CHOLECYSTECTOMY     • CORONARY ARTERY BYPASS GRAFT     • CORONARY STENT PLACEMENT     • HERNIA REPAIR     • HYSTERECTOMY         Family History   Problem Relation Age of Onset   • Heart attack Mother    • Anuerysm Father    • Heart attack Sister    • Heart disease Sister    • Heart disease Brother        Social History     Socioeconomic History   • Marital status:      Spouse name: Not on file   • Number of children: Not on file   • Years of education: Not on file   • Highest education level: Not on file   Tobacco Use   • Smoking status: Never Smoker   Substance and Sexual Activity   • Alcohol use: No   • Drug use: No   • Sexual activity: Defer           Objective   Physical Exam    Procedures       Results for orders placed or performed during the hospital encounter of 12/08/20   Comprehensive Metabolic Panel    Specimen: Blood   Result Value Ref Range    Glucose 101 (H) 65 - 99 mg/dL    BUN 17 8 - 23 mg/dL    Creatinine 1.00 0.57 - 1.00 mg/dL    Sodium 138 136 - 145 mmol/L    Potassium 4.5 3.5 - 5.2 mmol/L    Chloride 106 98 - 107 mmol/L    CO2 23.5 22.0 - 29.0 mmol/L    Calcium 9.4 8.6 - 10.5 mg/dL    Total Protein 6.9 6.0 - 8.5 g/dL    Albumin 3.90 3.50 - 5.20 g/dL    ALT (SGPT) 17 1 - 33 U/L    AST (SGOT) 23 1 - 32 U/L    Alkaline Phosphatase 80 39 - 117  U/L    Total Bilirubin 0.5 0.0 - 1.2 mg/dL    eGFR Non African Amer 53 (L) >60 mL/min/1.73    Globulin 3.0 gm/dL    A/G Ratio 1.3 g/dL    BUN/Creatinine Ratio 17.0 7.0 - 25.0    Anion Gap 8.5 5.0 - 15.0 mmol/L   Troponin    Specimen: Blood   Result Value Ref Range    Troponin T <0.010 0.000 - 0.030 ng/mL   BNP    Specimen: Blood   Result Value Ref Range    proBNP 531.4 0.0-1,800.0 pg/mL   CBC Auto Differential    Specimen: Blood   Result Value Ref Range    WBC 5.09 3.40 - 10.80 10*3/mm3    RBC 4.84 3.77 - 5.28 10*6/mm3    Hemoglobin 15.0 12.0 - 15.9 g/dL    Hematocrit 45.8 34.0 - 46.6 %    MCV 94.6 79.0 - 97.0 fL    MCH 31.0 26.6 - 33.0 pg    MCHC 32.8 31.5 - 35.7 g/dL    RDW 13.1 12.3 - 15.4 %    RDW-SD 45.9 37.0 - 54.0 fl    MPV 9.3 6.0 - 12.0 fL    Platelets 134 (L) 140 - 450 10*3/mm3    Neutrophil % 57.6 42.7 - 76.0 %    Lymphocyte % 32.0 19.6 - 45.3 %    Monocyte % 9.0 5.0 - 12.0 %    Eosinophil % 0.6 0.3 - 6.2 %    Basophil % 0.6 0.0 - 1.5 %    Immature Grans % 0.2 0.0 - 0.5 %    Neutrophils, Absolute 2.93 1.70 - 7.00 10*3/mm3    Lymphocytes, Absolute 1.63 0.70 - 3.10 10*3/mm3    Monocytes, Absolute 0.46 0.10 - 0.90 10*3/mm3    Eosinophils, Absolute 0.03 0.00 - 0.40 10*3/mm3    Basophils, Absolute 0.03 0.00 - 0.20 10*3/mm3    Immature Grans, Absolute 0.01 0.00 - 0.05 10*3/mm3    nRBC 0.0 0.0 - 0.2 /100 WBC   Scan Slide    Specimen: Blood   Result Value Ref Range    Hypochromia Slight/1+ None Seen    Macrocytes Slight/1+ None Seen    Large Platelets Slight/1+ None Seen   Troponin    Specimen: Blood   Result Value Ref Range    Troponin T <0.010 0.000 - 0.030 ng/mL   Troponin    Specimen: Arm, Left; Blood   Result Value Ref Range    Troponin T <0.010 0.000 - 0.030 ng/mL   ECG 12 Lead   Result Value Ref Range    QT Interval 424 ms    QTC Interval 460 ms     Xr Chest 1 View    Result Date: 12/8/2020  Narrative: XR CHEST 1 VW-  CLINICAL INDICATION: Chest pain   COMPARISON: 03/20/2018  TECHNIQUE: Single frontal view  of the chest.  FINDINGS:  There is no focal alveolar infiltrate or effusion. The cardiac silhouette is normal. The pulmonary vasculature is unremarkable. There is no evidence of an acute osseous abnormality. There are no suspicious-appearing parenchymal soft tissue nodules.       Impression: No evidence of active or acute cardiopulmonary disease on today's chest radiograph.  This report was finalized on 12/8/2020 11:30 AM by Dr. Joshua Chaudhry MD.          ED Course  ED Course as of Dec 09 0825   Tue Dec 08, 2020   1012 EKG at 1003 shows sinus rhythm with a rate of 71.  NJ interval 192, QRS duration 126, QTc 460 ms.  Right bundle branch block.  No apparent acute ischemia, no evidence for STEMI.  Nonspecific ST-T changes.    [CM]   1135 Patient hasn't taken her aspirin or plavix today and doesn't want to take nitro as it makes her BP drop quickly.     [MM]   1147 IMPRESSION:  No evidence of active or acute cardiopulmonary disease on today's chest  radiograph.     This report was finalized on 12/8/2020 11:30 AM by Dr. Joshua Chaudhry MD.   XR Chest 1 View [MM]   1212 I saw this patient with Alisha.  I agree with her plan of care.    [CM]   1225 Spoke with the patient about admission and she would like to be transferred to [facility] where her cardiologist is.     [MM]   1410 Dr. Michel hospitalist at [facility] has accepted her in transfer at [facility].     [MM]   2027 I have personally seen and evaluated this patient.  She is resting comfortably but complains of some abdominal discomfort.  She states that she has acid reflux and requests a Nexium or Protonix.  The patient also states that she is having a little bit of a headache and some mild chest pain at this time.  She requesting [place].  The patient states that she is feeling dehydrated and states that she would like to have some IV fluids.  She has no additional complaints at this time.  Vital signs are currently stable.    [EG]   2223 Sign out give to   Tyrone.  Patient is waiting on transfer and bed at [facility]. Patient vitals stable. She is doing well.     [ML]   Wed Dec 09, 2020   0512 Received patient checkout from previous team.  80-year-old female coming in for evaluation of chest pain.  Heart score 6.  Pending transfer to [facility] where her cardiologist is per previous team.  Will repeat troponin.  Given aspirin by previous team.Repeat Trop negative.    [JA]   0655 Handed off to Dr. Shen. Pending transfer to [facility].     [JA]   0811 Care assumed from Dr. Trujillo at shift change.  No complaints at this time.  No chest pain.  Hemodynamically stable.  She has had 3 negative cardiac enzymes.  The patient states that she would rather go home and be transferred to [place].  At this time, there are no beds available.  Have discussed with PA for her cardiologist, Dr. Sanchez.  They agree with outpatient follow-up.  The office will contact Mrs. Batista today to make appointment for the next couple of days.    [BC]      ED Course User Index  [BC] Jason Shen MD  [CM] Andrei Underwood MD  [EG] Nery Hansen DO  [JA] Hood Trujillo MD  [ML] Lupe Krause PA  [MM] Alisha Burnett PA                                           MDM  Number of Diagnoses or Management Options  Chest pain, unspecified type:   History of coronary artery disease:   History of coronary artery stent placement:   Risk of Complications, Morbidity, and/or Mortality  Presenting problems: high  Diagnostic procedures: moderate  Management options: high        Final diagnoses:   Chest pain, unspecified type   History of coronary artery disease   History of coronary artery stent placement            Jason Shen MD  12/09/20 0884

## 2020-12-09 NOTE — ED NOTES
Janie called with [facility], patient is still on a pending list. She will call again around 0600 with another update.      Symes, Heather  12/09/20 0300

## 2020-12-09 NOTE — ED NOTES
IV access noted to be infiltrated, swelling and redness noted. IV fluids held until new IV access obtained.     Radha Pacheco RN  12/09/20 5733

## 2020-12-09 NOTE — ED NOTES
Called nutrition spoke romana Viera and requested cardiac diet tray.      Symes, Heather  12/09/20 0694

## 2020-12-09 NOTE — ED NOTES
Called [facility] spoke with Ani to check bed status. She states they still do not have a bed at this time. Not sure when they will get a bed at this time.      Symes, Heather  12/08/20 5141

## 2020-12-10 ENCOUNTER — PATIENT OUTREACH (OUTPATIENT)
Dept: CASE MANAGEMENT | Facility: OTHER | Age: 80
End: 2020-12-10

## 2020-12-10 NOTE — OUTREACH NOTE
Patient Outreach Note    Patient was seen in the ED 12/8-9 for chest pain. RN-SADIA spoke with patient's /cg who reports patient is currently resting but that she is feeling much better. Cg states he feels like the pain was related to a known hiatal hernia and he has scheduled for her to have a scope done. AVS instructions reviewed including 24/7 nurse line number, cg v/u, denies questions/concerns at this time.    Sonja Tong RN  Ambulatory     12/10/2020, 11:15 EST

## 2020-12-22 ENCOUNTER — TRANSCRIBE ORDERS (OUTPATIENT)
Dept: ADMINISTRATIVE | Facility: HOSPITAL | Age: 80
End: 2020-12-22

## 2020-12-22 DIAGNOSIS — Z01.818 PRE-OPERATIVE CLEARANCE: Primary | ICD-10-CM

## 2020-12-29 ENCOUNTER — LAB (OUTPATIENT)
Dept: LAB | Facility: HOSPITAL | Age: 80
End: 2020-12-29

## 2020-12-29 DIAGNOSIS — Z01.818 PRE-OPERATIVE CLEARANCE: ICD-10-CM

## 2020-12-29 PROCEDURE — C9803 HOPD COVID-19 SPEC COLLECT: HCPCS

## 2020-12-29 PROCEDURE — U0004 COV-19 TEST NON-CDC HGH THRU: HCPCS | Performed by: SURGERY

## 2020-12-30 LAB — SARS-COV-2 RNA RESP QL NAA+PROBE: NOT DETECTED

## 2021-01-13 ENCOUNTER — IMMUNIZATION (OUTPATIENT)
Dept: VACCINE CLINIC | Facility: HOSPITAL | Age: 81
End: 2021-01-13

## 2021-01-13 PROCEDURE — 91300 HC SARSCOV02 VAC 30MCG/0.3ML IM: CPT | Performed by: FAMILY MEDICINE

## 2021-01-13 PROCEDURE — 0001A: CPT | Performed by: FAMILY MEDICINE

## 2021-02-03 ENCOUNTER — IMMUNIZATION (OUTPATIENT)
Dept: VACCINE CLINIC | Facility: HOSPITAL | Age: 81
End: 2021-02-03

## 2021-02-03 PROCEDURE — 0002A: CPT | Performed by: INTERNAL MEDICINE

## 2021-02-03 PROCEDURE — 91300 HC SARSCOV02 VAC 30MCG/0.3ML IM: CPT | Performed by: INTERNAL MEDICINE

## 2021-04-13 ENCOUNTER — OFFICE VISIT (OUTPATIENT)
Dept: SURGERY | Facility: CLINIC | Age: 81
End: 2021-04-13

## 2021-04-13 VITALS
BODY MASS INDEX: 28.32 KG/M2 | WEIGHT: 170 LBS | DIASTOLIC BLOOD PRESSURE: 83 MMHG | SYSTOLIC BLOOD PRESSURE: 146 MMHG | HEART RATE: 71 BPM | HEIGHT: 65 IN

## 2021-04-13 DIAGNOSIS — L72.3 SEBACEOUS CYST: Primary | ICD-10-CM

## 2021-04-13 PROCEDURE — 10060 I&D ABSCESS SIMPLE/SINGLE: CPT | Performed by: SURGERY

## 2021-04-13 PROCEDURE — 99202 OFFICE O/P NEW SF 15 MIN: CPT | Performed by: SURGERY

## 2021-04-13 RX ORDER — AMLODIPINE BESYLATE AND BENAZEPRIL HYDROCHLORIDE 2.5; 1 MG/1; MG/1
1 CAPSULE ORAL DAILY
COMMUNITY
End: 2022-01-01 | Stop reason: HOSPADM

## 2021-04-13 RX ORDER — FAMOTIDINE 20 MG/1
20 TABLET, FILM COATED ORAL 2 TIMES DAILY
COMMUNITY
End: 2022-01-01

## 2021-04-13 RX ORDER — OMEGA-3S/DHA/EPA/FISH OIL/D3 300MG-1000
5000 CAPSULE ORAL DAILY
Status: ON HOLD | COMMUNITY
End: 2022-01-01

## 2021-04-13 RX ORDER — LANOLIN ALCOHOL/MO/W.PET/CERES
1000 CREAM (GRAM) TOPICAL DAILY
COMMUNITY
End: 2022-01-01

## 2021-04-14 PROBLEM — L72.3 SEBACEOUS CYST: Status: ACTIVE | Noted: 2021-04-14

## 2021-04-14 NOTE — PROGRESS NOTES
Subjective   Eloina Batista is a 81 y.o. female is being seen for consultation today at the request of Alex Og MD    Eloina Batista is a 81 y.o. female Presenting with painful lesion just above the right labia with discharge and drainage consistent with infected and ruptured sebaceous cyst.  No fever or systemic symptoms.  Minimal cellulitis.  Lesion measures 2.1 cm in diameter.      Past Medical History:   Diagnosis Date   • Arthritis    • Coronary artery disease    • GERD (gastroesophageal reflux disease)    • Hyperlipidemia    • Hypertension        Family History   Problem Relation Age of Onset   • Heart attack Mother    • Anuerysm Father    • Heart attack Sister    • Heart disease Sister    • Heart disease Brother        Social History     Socioeconomic History   • Marital status:      Spouse name: Not on file   • Number of children: Not on file   • Years of education: Not on file   • Highest education level: Not on file   Tobacco Use   • Smoking status: Never Smoker   • Smokeless tobacco: Never Used   Substance and Sexual Activity   • Alcohol use: No   • Drug use: No   • Sexual activity: Defer       Past Surgical History:   Procedure Laterality Date   • APPENDECTOMY     • CARDIAC CATHETERIZATION     • CARDIAC CATHETERIZATION N/A 3/22/2017    Procedure: Left Heart Cath;  Surgeon: Alex Blanco MD;  Location: [facility] INVASIVE LOCATION;  Service:    • CARDIAC SURGERY     • CHOLECYSTECTOMY     • CORONARY ARTERY BYPASS GRAFT     • CORONARY STENT PLACEMENT     • HERNIA REPAIR     • HYSTERECTOMY         Review of Systems   Constitutional: Negative for activity change, appetite change, chills and fever.   HENT: Negative for sore throat and trouble swallowing.    Eyes: Negative for visual disturbance.   Respiratory: Negative for cough and shortness of breath.    Cardiovascular: Negative for chest pain and palpitations.   Gastrointestinal: Negative for abdominal distention, abdominal pain, blood in  "stool, constipation, diarrhea, nausea and vomiting.   Endocrine: Negative for cold intolerance and heat intolerance.   Genitourinary: Negative for dysuria.   Musculoskeletal: Negative for joint swelling.   Skin: Positive for wound. Negative for color change and rash.   Allergic/Immunologic: Negative for immunocompromised state.   Neurological: Negative for dizziness, seizures, weakness and headaches.   Hematological: Negative for adenopathy. Does not bruise/bleed easily.   Psychiatric/Behavioral: Negative for agitation and confusion.         /83   Pulse 71   Ht 165.1 cm (65\")   Wt 77.1 kg (170 lb)   BMI 28.29 kg/m²   Objective   Physical Exam  Constitutional:       Appearance: She is well-developed.   HENT:      Head: Normocephalic and atraumatic.   Eyes:      Conjunctiva/sclera: Conjunctivae normal.      Pupils: Pupils are equal, round, and reactive to light.   Neck:      Thyroid: No thyromegaly.      Vascular: No JVD.      Trachea: No tracheal deviation.   Cardiovascular:      Rate and Rhythm: Normal rate and regular rhythm.      Heart sounds: No murmur heard.   No friction rub. No gallop.    Pulmonary:      Effort: Pulmonary effort is normal.      Breath sounds: Normal breath sounds.   Abdominal:      General: There is no distension.      Palpations: Abdomen is soft. There is no hepatomegaly or splenomegaly.      Tenderness: There is no abdominal tenderness.      Hernia: No hernia is present.   Musculoskeletal:         General: No deformity. Normal range of motion.      Cervical back: Neck supple.   Skin:     General: Skin is warm and dry.      Comments: Just above the right labia there is a 2.1 cm ruptured infected sebaceous cyst   Neurological:      Mental Status: She is alert and oriented to person, place, and time.       Excision and drainage of infected sebaceous cyst right labia    Patient right labia and suprapubic region prepped and draped in usual sterile fashion.  After timeout procedure local " anesthetic was infiltrated.  At the site of drainage incision was made allowing all cyst contents to be evacuated.  The capsule was removed from the surrounding area and the wound was irrigated to clear and packed with gauze.  Patient tolerated the procedure well.    Estimated blood loss less than 5 mL    Specimens: None    Complications: None        Assessment   Diagnoses and all orders for this visit:    1. Sebaceous cyst (Primary)      Eloina Batista is a 81 y.o. female with ruptured sebaceous cyst just above the right labia.  Drainage and excision performed in the office today.  Packed the wound once daily with gauze.  Follow-up as needed.    Patient's Body mass index is 28.29 kg/m². BMI is above normal parameters. Recommendations include: educational material.

## 2021-09-27 ENCOUNTER — IMMUNIZATION (OUTPATIENT)
Dept: VACCINE CLINIC | Facility: HOSPITAL | Age: 81
End: 2021-09-27

## 2021-09-27 PROCEDURE — 0003A: CPT | Performed by: INTERNAL MEDICINE

## 2021-09-27 PROCEDURE — 91300 HC SARSCOV02 VAC 30MCG/0.3ML IM: CPT | Performed by: INTERNAL MEDICINE

## 2021-10-11 ENCOUNTER — TRANSCRIBE ORDERS (OUTPATIENT)
Dept: ADMINISTRATIVE | Facility: HOSPITAL | Age: 81
End: 2021-10-11

## 2021-10-11 DIAGNOSIS — G89.29 CHRONIC RIGHT HIP PAIN: Primary | ICD-10-CM

## 2021-10-11 DIAGNOSIS — M25.551 CHRONIC RIGHT HIP PAIN: Primary | ICD-10-CM

## 2021-10-15 ENCOUNTER — HOSPITAL ENCOUNTER (OUTPATIENT)
Dept: MRI IMAGING | Facility: HOSPITAL | Age: 81
Discharge: HOME | End: 2021-10-15
Admitting: INTERNAL MEDICINE

## 2021-10-15 DIAGNOSIS — M25.551 CHRONIC RIGHT HIP PAIN: ICD-10-CM

## 2021-10-15 DIAGNOSIS — G89.29 CHRONIC RIGHT HIP PAIN: ICD-10-CM

## 2021-10-15 PROCEDURE — 73721 MRI JNT OF LWR EXTRE W/O DYE: CPT

## 2021-10-15 PROCEDURE — 73721 MRI JNT OF LWR EXTRE W/O DYE: CPT | Performed by: RADIOLOGY

## 2021-10-26 ENCOUNTER — TRANSCRIBE ORDERS (OUTPATIENT)
Dept: ADMINISTRATIVE | Facility: HOSPITAL | Age: 81
End: 2021-10-26

## 2021-10-26 DIAGNOSIS — M84.359G: Primary | ICD-10-CM

## 2021-11-08 ENCOUNTER — HOSPITAL ENCOUNTER (OUTPATIENT)
Dept: BONE DENSITY | Facility: HOSPITAL | Age: 81
Discharge: HOME | End: 2021-11-08
Admitting: INTERNAL MEDICINE

## 2021-11-08 DIAGNOSIS — Z78.0 POST-MENOPAUSAL: ICD-10-CM

## 2021-11-08 PROCEDURE — 77080 DXA BONE DENSITY AXIAL: CPT

## 2021-11-08 PROCEDURE — 77080 DXA BONE DENSITY AXIAL: CPT | Performed by: RADIOLOGY

## 2021-11-15 ENCOUNTER — TRANSCRIBE ORDERS (OUTPATIENT)
Dept: ADMINISTRATIVE | Facility: HOSPITAL | Age: 81
End: 2021-11-15

## 2021-11-15 DIAGNOSIS — M51.16 LUMBAR DISC HERNIATION WITH RADICULOPATHY: Primary | ICD-10-CM

## 2021-11-17 ENCOUNTER — HOSPITAL ENCOUNTER (OUTPATIENT)
Dept: MRI IMAGING | Facility: HOSPITAL | Age: 81
Discharge: HOME | End: 2021-11-17
Admitting: INTERNAL MEDICINE

## 2021-11-17 DIAGNOSIS — M51.16 LUMBAR DISC HERNIATION WITH RADICULOPATHY: ICD-10-CM

## 2021-11-17 PROCEDURE — 72148 MRI LUMBAR SPINE W/O DYE: CPT | Performed by: RADIOLOGY

## 2021-11-17 PROCEDURE — 72148 MRI LUMBAR SPINE W/O DYE: CPT

## 2022-01-01 ENCOUNTER — APPOINTMENT (OUTPATIENT)
Dept: GENERAL RADIOLOGY | Facility: HOSPITAL | Age: 82
End: 2022-01-01

## 2022-01-01 ENCOUNTER — ANESTHESIA EVENT CONVERTED (OUTPATIENT)
Dept: ANESTHESIOLOGY | Facility: HOSPITAL | Age: 82
End: 2022-01-01

## 2022-01-01 ENCOUNTER — HOSPITAL ENCOUNTER (INPATIENT)
Facility: HOSPITAL | Age: 82
LOS: 15 days | Discharge: HOME HEALTH | End: 2022-09-09
Attending: FAMILY MEDICINE | Admitting: FAMILY MEDICINE

## 2022-01-01 ENCOUNTER — LAB REQUISITION (OUTPATIENT)
Dept: LAB | Facility: HOSPITAL | Age: 82
End: 2022-01-01

## 2022-01-01 ENCOUNTER — HOSPITAL ENCOUNTER (INPATIENT)
Facility: HOSPITAL | Age: 82
LOS: 7 days | Discharge: NURSING, REHAB OR LONG-TERM CARE | End: 2022-08-25
Attending: EMERGENCY MEDICINE | Admitting: PEDIATRICS

## 2022-01-01 ENCOUNTER — HOSPITAL ENCOUNTER (OUTPATIENT)
Dept: GENERAL RADIOLOGY | Facility: HOSPITAL | Age: 82
Discharge: HOME | End: 2022-08-17
Admitting: INTERNAL MEDICINE

## 2022-01-01 ENCOUNTER — ANESTHESIA (OUTPATIENT)
Dept: PERIOP | Facility: HOSPITAL | Age: 82
End: 2022-01-01

## 2022-01-01 ENCOUNTER — HOSPITAL ENCOUNTER (EMERGENCY)
Facility: HOSPITAL | Age: 82
Discharge: HOME | End: 2022-10-24
Attending: STUDENT IN AN ORGANIZED HEALTH CARE EDUCATION/TRAINING PROGRAM | Admitting: STUDENT IN AN ORGANIZED HEALTH CARE EDUCATION/TRAINING PROGRAM

## 2022-01-01 ENCOUNTER — HOSPITAL ENCOUNTER (EMERGENCY)
Facility: HOSPITAL | Age: 82
End: 2022-12-18
Attending: EMERGENCY MEDICINE | Admitting: EMERGENCY MEDICINE

## 2022-01-01 ENCOUNTER — APPOINTMENT (OUTPATIENT)
Dept: CARDIOLOGY | Facility: HOSPITAL | Age: 82
End: 2022-01-01

## 2022-01-01 ENCOUNTER — ANESTHESIA EVENT (OUTPATIENT)
Dept: PERIOP | Facility: HOSPITAL | Age: 82
End: 2022-01-01

## 2022-01-01 ENCOUNTER — APPOINTMENT (OUTPATIENT)
Dept: CT IMAGING | Facility: HOSPITAL | Age: 82
End: 2022-01-01

## 2022-01-01 ENCOUNTER — HOSPITAL ENCOUNTER (OUTPATIENT)
Dept: RESPIRATORY THERAPY | Facility: HOSPITAL | Age: 82
Discharge: HOME | End: 2022-10-05
Admitting: INTERNAL MEDICINE

## 2022-01-01 ENCOUNTER — TRANSCRIBE ORDERS (OUTPATIENT)
Dept: ADMINISTRATIVE | Facility: HOSPITAL | Age: 82
End: 2022-01-01

## 2022-01-01 VITALS — HEIGHT: 65 IN | TEMPERATURE: 97.1 F | BODY MASS INDEX: 25.82 KG/M2 | OXYGEN SATURATION: 96 % | WEIGHT: 154.98 LBS

## 2022-01-01 VITALS
TEMPERATURE: 98.5 F | SYSTOLIC BLOOD PRESSURE: 143 MMHG | HEIGHT: 65 IN | HEART RATE: 90 BPM | DIASTOLIC BLOOD PRESSURE: 78 MMHG | WEIGHT: 170.86 LBS | RESPIRATION RATE: 18 BRPM | BODY MASS INDEX: 28.47 KG/M2 | OXYGEN SATURATION: 100 %

## 2022-01-01 VITALS
DIASTOLIC BLOOD PRESSURE: 80 MMHG | BODY MASS INDEX: 28.32 KG/M2 | TEMPERATURE: 97.9 F | SYSTOLIC BLOOD PRESSURE: 130 MMHG | HEART RATE: 86 BPM | HEIGHT: 65 IN | OXYGEN SATURATION: 97 % | WEIGHT: 170 LBS | RESPIRATION RATE: 20 BRPM

## 2022-01-01 VITALS
SYSTOLIC BLOOD PRESSURE: 149 MMHG | TEMPERATURE: 97.6 F | HEART RATE: 95 BPM | RESPIRATION RATE: 18 BRPM | BODY MASS INDEX: 25.83 KG/M2 | WEIGHT: 155 LBS | OXYGEN SATURATION: 99 % | HEIGHT: 65 IN | DIASTOLIC BLOOD PRESSURE: 88 MMHG

## 2022-01-01 DIAGNOSIS — I48.0 PAROXYSMAL ATRIAL FIBRILLATION: Primary | ICD-10-CM

## 2022-01-01 DIAGNOSIS — M51.16 LUMBAR DISC HERNIATION WITH RADICULOPATHY: ICD-10-CM

## 2022-01-01 DIAGNOSIS — I48.0 PAROXYSMAL ATRIAL FIBRILLATION: ICD-10-CM

## 2022-01-01 DIAGNOSIS — Z87.81 S/P LEFT HIP FRACTURE: Primary | ICD-10-CM

## 2022-01-01 DIAGNOSIS — I46.9 CARDIAC ARREST: ICD-10-CM

## 2022-01-01 DIAGNOSIS — S72.002A CLOSED LEFT HIP FRACTURE, INITIAL ENCOUNTER: Primary | ICD-10-CM

## 2022-01-01 DIAGNOSIS — I48.20 CHRONIC ATRIAL FIBRILLATION, UNSPECIFIED: ICD-10-CM

## 2022-01-01 DIAGNOSIS — M16.0 PRIMARY OSTEOARTHRITIS OF BOTH HIPS: ICD-10-CM

## 2022-01-01 DIAGNOSIS — R07.9 CHEST PAIN, UNSPECIFIED TYPE: Primary | ICD-10-CM

## 2022-01-01 DIAGNOSIS — S72.002A FRACTURE OF UNSPECIFIED PART OF NECK OF LEFT FEMUR, INITIAL ENCOUNTER FOR CLOSED FRACTURE: ICD-10-CM
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
## 2022-01-01 DIAGNOSIS — I10 ESSENTIAL (PRIMARY) HYPERTENSION: ICD-10-CM

## 2022-01-01 DIAGNOSIS — Z91.81 HISTORY OF FALLING: ICD-10-CM

## 2022-01-01 DIAGNOSIS — M51.16 LUMBAR DISC HERNIATION WITH RADICULOPATHY: Primary | ICD-10-CM

## 2022-01-01 LAB
A-A DO2: 27.7 MMHG (ref 0–300)
ABO GROUP BLD: NORMAL
ALBUMIN SERPL-MCNC: 2.61 G/DL (ref 3.5–5.2)
ALBUMIN SERPL-MCNC: 2.78 G/DL (ref 3.5–5.2)
ALBUMIN SERPL-MCNC: 3.31 G/DL (ref 3.5–5.2)
ALBUMIN SERPL-MCNC: 3.36 G/DL (ref 3.5–5.2)
ALBUMIN SERPL-MCNC: 3.9 G/DL (ref 3.5–5.2)
ALBUMIN/GLOB SERPL: 0.8 G/DL
ALBUMIN/GLOB SERPL: 0.8 G/DL
ALBUMIN/GLOB SERPL: 0.9 G/DL
ALBUMIN/GLOB SERPL: 1.1 G/DL
ALBUMIN/GLOB SERPL: 1.2 G/DL
ALP SERPL-CCNC: 100 U/L (ref 39–117)
ALP SERPL-CCNC: 102 U/L (ref 39–117)
ALP SERPL-CCNC: 107 U/L (ref 39–117)
ALP SERPL-CCNC: 111 U/L (ref 39–117)
ALP SERPL-CCNC: 87 U/L (ref 39–117)
ALT SERPL W P-5'-P-CCNC: 10 U/L (ref 1–33)
ALT SERPL W P-5'-P-CCNC: 15 U/L (ref 1–33)
ALT SERPL W P-5'-P-CCNC: 24 U/L (ref 1–33)
ALT SERPL W P-5'-P-CCNC: 25 U/L (ref 1–33)
ALT SERPL W P-5'-P-CCNC: 26 U/L (ref 1–33)
ANION GAP SERPL CALCULATED.3IONS-SCNC: 10.5 MMOL/L (ref 5–15)
ANION GAP SERPL CALCULATED.3IONS-SCNC: 10.7 MMOL/L (ref 5–15)
ANION GAP SERPL CALCULATED.3IONS-SCNC: 11 MMOL/L (ref 5–15)
ANION GAP SERPL CALCULATED.3IONS-SCNC: 12 MMOL/L (ref 5–15)
ANION GAP SERPL CALCULATED.3IONS-SCNC: 12.4 MMOL/L (ref 5–15)
ANION GAP SERPL CALCULATED.3IONS-SCNC: 15 MMOL/L (ref 5–15)
ANION GAP SERPL CALCULATED.3IONS-SCNC: 15 MMOL/L (ref 5–15)
ANION GAP SERPL CALCULATED.3IONS-SCNC: 9 MMOL/L (ref 5–15)
ANION GAP SERPL CALCULATED.3IONS-SCNC: 9.6 MMOL/L (ref 5–15)
ARTERIAL PATENCY WRIST A: ABNORMAL
ASCENDING AORTA: 3.8 CM
AST SERPL-CCNC: 22 U/L (ref 1–32)
AST SERPL-CCNC: 31 U/L (ref 1–32)
AST SERPL-CCNC: 35 U/L (ref 1–32)
AST SERPL-CCNC: 36 U/L (ref 1–32)
AST SERPL-CCNC: 37 U/L (ref 1–32)
ATMOSPHERIC PRESS: 732 MMHG
BACTERIA SPEC AEROBE CULT: NO GROWTH
BACTERIA UR QL AUTO: ABNORMAL /HPF
BASE EXCESS BLDA CALC-SCNC: 1.3 MMOL/L (ref 0–2)
BASOPHILS # BLD AUTO: 0.03 10*3/MM3 (ref 0–0.2)
BASOPHILS # BLD AUTO: 0.04 10*3/MM3 (ref 0–0.2)
BASOPHILS # BLD MANUAL: 0.08 10*3/MM3 (ref 0–0.2)
BASOPHILS NFR BLD AUTO: 0.3 % (ref 0–1.5)
BASOPHILS NFR BLD AUTO: 0.3 % (ref 0–1.5)
BASOPHILS NFR BLD AUTO: 0.4 % (ref 0–1.5)
BASOPHILS NFR BLD AUTO: 0.5 % (ref 0–1.5)
BASOPHILS NFR BLD AUTO: 0.6 % (ref 0–1.5)
BASOPHILS NFR BLD MANUAL: 1 % (ref 0–1.5)
BDY SITE: ABNORMAL
BH CV ECHO MEAS - AI P1/2T: 493.8 MSEC
BH CV ECHO MEAS - AO MAX PG: 13.6 MMHG
BH CV ECHO MEAS - AO MEAN PG: 6.8 MMHG
BH CV ECHO MEAS - AO ROOT DIAM: 3.1 CM
BH CV ECHO MEAS - AO V2 MAX: 184.6 CM/SEC
BH CV ECHO MEAS - AO V2 VTI: 26.6 CM
BH CV ECHO MEAS - AVA(I,D): 2.41 CM2
BH CV ECHO MEAS - EDV(CUBED): 64 ML
BH CV ECHO MEAS - EDV(MOD-SP2): 58.3 ML
BH CV ECHO MEAS - EDV(MOD-SP4): 81 ML
BH CV ECHO MEAS - EF(MOD-BP): 66 %
BH CV ECHO MEAS - EF(MOD-SP2): 58.7 %
BH CV ECHO MEAS - EF(MOD-SP4): 56.4 %
BH CV ECHO MEAS - ESV(CUBED): 15.6 ML
BH CV ECHO MEAS - ESV(MOD-SP2): 24.1 ML
BH CV ECHO MEAS - ESV(MOD-SP4): 35.3 ML
BH CV ECHO MEAS - FS: 37.5 %
BH CV ECHO MEAS - IVS/LVPW: 1.08 CM
BH CV ECHO MEAS - IVSD: 1.3 CM
BH CV ECHO MEAS - LA DIMENSION: 3.7 CM
BH CV ECHO MEAS - LAT PEAK E' VEL: 14 CM/SEC
BH CV ECHO MEAS - LV MASS(C)D: 175.8 GRAMS
BH CV ECHO MEAS - LV MAX PG: 5.8 MMHG
BH CV ECHO MEAS - LV MEAN PG: 3 MMHG
BH CV ECHO MEAS - LV V1 MAX: 120 CM/SEC
BH CV ECHO MEAS - LV V1 VTI: 18.5 CM
BH CV ECHO MEAS - LVIDD: 4 CM
BH CV ECHO MEAS - LVIDS: 2.5 CM
BH CV ECHO MEAS - LVOT AREA: 3.5 CM2
BH CV ECHO MEAS - LVOT DIAM: 2.1 CM
BH CV ECHO MEAS - LVPWD: 1.2 CM
BH CV ECHO MEAS - MED PEAK E' VEL: 12.2 CM/SEC
BH CV ECHO MEAS - MV DEC SLOPE: 579 CM/SEC2
BH CV ECHO MEAS - MV DEC TIME: 0.2 MSEC
BH CV ECHO MEAS - MV E MAX VEL: 106 CM/SEC
BH CV ECHO MEAS - MV P1/2T: 54.1 MSEC
BH CV ECHO MEAS - MVA(P1/2T): 4.1 CM2
BH CV ECHO MEAS - PA ACC TIME: 0.09 SEC
BH CV ECHO MEAS - PA PR(ACCEL): 39.8 MMHG
BH CV ECHO MEAS - PA V2 MAX: 108 CM/SEC
BH CV ECHO MEAS - PI END-D VEL: 78.6 CM/SEC
BH CV ECHO MEAS - RAP SYSTOLE: 3 MMHG
BH CV ECHO MEAS - RVSP: 33 MMHG
BH CV ECHO MEAS - SV(LVOT): 64.1 ML
BH CV ECHO MEAS - SV(MOD-SP2): 34.2 ML
BH CV ECHO MEAS - SV(MOD-SP4): 45.7 ML
BH CV ECHO MEAS - TAPSE (>1.6): 1.66 CM
BH CV ECHO MEAS - TR MAX PG: 27.2 MMHG
BH CV ECHO MEAS - TR MAX VEL: 260.3 CM/SEC
BH CV ECHO MEASUREMENTS AVERAGE E/E' RATIO: 8.09
BH CV VAS BP LEFT ARM: NORMAL MMHG
BH CV XLRA - RV BASE: 4 CM
BH CV XLRA - RV LENGTH: 5.4 CM
BH CV XLRA - RV MID: 3.1 CM
BH CV XLRA - TDI S': 18.5 CM/SEC
BILIRUB SERPL-MCNC: 0.5 MG/DL (ref 0–1.2)
BILIRUB SERPL-MCNC: 0.6 MG/DL (ref 0–1.2)
BILIRUB SERPL-MCNC: 0.6 MG/DL (ref 0–1.2)
BILIRUB SERPL-MCNC: 1.1 MG/DL (ref 0–1.2)
BILIRUB SERPL-MCNC: 1.3 MG/DL (ref 0–1.2)
BILIRUB UR QL STRIP: NEGATIVE
BILIRUB UR QL STRIP: NEGATIVE
BLD GP AB SCN SERPL QL: NEGATIVE
BODY TEMPERATURE: 0 C
BUN SERPL-MCNC: 10 MG/DL (ref 8–23)
BUN SERPL-MCNC: 11 MG/DL (ref 8–23)
BUN SERPL-MCNC: 11 MG/DL (ref 8–23)
BUN SERPL-MCNC: 12 MG/DL (ref 8–23)
BUN SERPL-MCNC: 14 MG/DL (ref 8–23)
BUN SERPL-MCNC: 15 MG/DL (ref 8–23)
BUN SERPL-MCNC: 16 MG/DL (ref 8–23)
BUN SERPL-MCNC: 18 MG/DL (ref 8–23)
BUN SERPL-MCNC: 21 MG/DL (ref 8–23)
BUN/CREAT SERPL: 10.6 (ref 7–25)
BUN/CREAT SERPL: 11.5 (ref 7–25)
BUN/CREAT SERPL: 13 (ref 7–25)
BUN/CREAT SERPL: 14.3 (ref 7–25)
BUN/CREAT SERPL: 15 (ref 7–25)
BUN/CREAT SERPL: 15.4 (ref 7–25)
BUN/CREAT SERPL: 15.6 (ref 7–25)
BUN/CREAT SERPL: 16.2 (ref 7–25)
BUN/CREAT SERPL: 16.7 (ref 7–25)
CALCIUM SPEC-SCNC: 10.1 MG/DL (ref 8.6–10.5)
CALCIUM SPEC-SCNC: 10.1 MG/DL (ref 8.6–10.5)
CALCIUM SPEC-SCNC: 10.2 MG/DL (ref 8.6–10.5)
CALCIUM SPEC-SCNC: 8.1 MG/DL (ref 8.6–10.5)
CALCIUM SPEC-SCNC: 8.4 MG/DL (ref 8.6–10.5)
CALCIUM SPEC-SCNC: 8.8 MG/DL (ref 8.6–10.5)
CALCIUM SPEC-SCNC: 9.2 MG/DL (ref 8.6–10.5)
CALCIUM SPEC-SCNC: 9.4 MG/DL (ref 8.6–10.5)
CALCIUM SPEC-SCNC: 9.6 MG/DL (ref 8.6–10.5)
CHLORIDE SERPL-SCNC: 100 MMOL/L (ref 98–107)
CHLORIDE SERPL-SCNC: 102 MMOL/L (ref 98–107)
CHLORIDE SERPL-SCNC: 103 MMOL/L (ref 98–107)
CHLORIDE SERPL-SCNC: 104 MMOL/L (ref 98–107)
CHLORIDE SERPL-SCNC: 97 MMOL/L (ref 98–107)
CHLORIDE SERPL-SCNC: 98 MMOL/L (ref 98–107)
CHLORIDE SERPL-SCNC: 98 MMOL/L (ref 98–107)
CK SERPL-CCNC: 43 U/L (ref 20–180)
CK SERPL-CCNC: 67 U/L (ref 20–180)
CLARITY UR: CLEAR
CLARITY UR: CLEAR
CO2 BLDA-SCNC: 25.5 MMOL/L (ref 22–33)
CO2 SERPL-SCNC: 18.6 MMOL/L (ref 22–29)
CO2 SERPL-SCNC: 20 MMOL/L (ref 22–29)
CO2 SERPL-SCNC: 21 MMOL/L (ref 22–29)
CO2 SERPL-SCNC: 21.4 MMOL/L (ref 22–29)
CO2 SERPL-SCNC: 22.3 MMOL/L (ref 22–29)
CO2 SERPL-SCNC: 23.5 MMOL/L (ref 22–29)
CO2 SERPL-SCNC: 24 MMOL/L (ref 22–29)
CO2 SERPL-SCNC: 24 MMOL/L (ref 22–29)
CO2 SERPL-SCNC: 26 MMOL/L (ref 22–29)
COHGB MFR BLD: 1.1 % (ref 0–5)
COLOR UR: YELLOW
COLOR UR: YELLOW
CREAT SERPL-MCNC: 0.74 MG/DL (ref 0.57–1)
CREAT SERPL-MCNC: 0.77 MG/DL (ref 0.57–1)
CREAT SERPL-MCNC: 0.91 MG/DL (ref 0.57–1)
CREAT SERPL-MCNC: 0.94 MG/DL (ref 0.57–1)
CREAT SERPL-MCNC: 0.96 MG/DL (ref 0.57–1)
CREAT SERPL-MCNC: 1.07 MG/DL (ref 0.57–1)
CREAT SERPL-MCNC: 1.08 MG/DL (ref 0.57–1)
CREAT SERPL-MCNC: 1.15 MG/DL (ref 0.57–1)
CREAT SERPL-MCNC: 1.35 MG/DL (ref 0.57–1)
CRP SERPL-MCNC: <0.3 MG/DL (ref 0–0.5)
D-LACTATE SERPL-SCNC: 0.8 MMOL/L (ref 0.5–2)
DEPRECATED RDW RBC AUTO: 42.9 FL (ref 37–54)
DEPRECATED RDW RBC AUTO: 44.7 FL (ref 37–54)
DEPRECATED RDW RBC AUTO: 44.8 FL (ref 37–54)
DEPRECATED RDW RBC AUTO: 47.5 FL (ref 37–54)
DEPRECATED RDW RBC AUTO: 47.9 FL (ref 37–54)
DEPRECATED RDW RBC AUTO: 50.3 FL (ref 37–54)
DEPRECATED RDW RBC AUTO: 55.7 FL (ref 37–54)
DEPRECATED RDW RBC AUTO: 60.6 FL (ref 37–54)
DEPRECATED RDW RBC AUTO: 62.3 FL (ref 37–54)
EGFRCR SERPLBLD CKD-EPI 2021: 39.6 ML/MIN/1.73
EGFRCR SERPLBLD CKD-EPI 2021: 48 ML/MIN/1.73
EGFRCR SERPLBLD CKD-EPI 2021: 51.7 ML/MIN/1.73
EGFRCR SERPLBLD CKD-EPI 2021: 52.3 ML/MIN/1.73
EGFRCR SERPLBLD CKD-EPI 2021: 59.6 ML/MIN/1.73
EGFRCR SERPLBLD CKD-EPI 2021: 61.1 ML/MIN/1.73
EGFRCR SERPLBLD CKD-EPI 2021: 63.5 ML/MIN/1.73
EGFRCR SERPLBLD CKD-EPI 2021: 77.6 ML/MIN/1.73
EGFRCR SERPLBLD CKD-EPI 2021: 81.4 ML/MIN/1.73
EOSINOPHIL # BLD AUTO: 0.01 10*3/MM3 (ref 0–0.4)
EOSINOPHIL # BLD AUTO: 0.05 10*3/MM3 (ref 0–0.4)
EOSINOPHIL # BLD AUTO: 0.06 10*3/MM3 (ref 0–0.4)
EOSINOPHIL # BLD AUTO: 0.19 10*3/MM3 (ref 0–0.4)
EOSINOPHIL # BLD AUTO: 0.19 10*3/MM3 (ref 0–0.4)
EOSINOPHIL # BLD AUTO: 0.2 10*3/MM3 (ref 0–0.4)
EOSINOPHIL # BLD AUTO: 0.22 10*3/MM3 (ref 0–0.4)
EOSINOPHIL # BLD MANUAL: 0.25 10*3/MM3 (ref 0–0.4)
EOSINOPHIL NFR BLD AUTO: 0.1 % (ref 0.3–6.2)
EOSINOPHIL NFR BLD AUTO: 0.6 % (ref 0.3–6.2)
EOSINOPHIL NFR BLD AUTO: 0.9 % (ref 0.3–6.2)
EOSINOPHIL NFR BLD AUTO: 2.3 % (ref 0.3–6.2)
EOSINOPHIL NFR BLD AUTO: 3.5 % (ref 0.3–6.2)
EOSINOPHIL NFR BLD MANUAL: 3 % (ref 0.3–6.2)
ERYTHROCYTE [DISTWIDTH] IN BLOOD BY AUTOMATED COUNT: 13.1 % (ref 12.3–15.4)
ERYTHROCYTE [DISTWIDTH] IN BLOOD BY AUTOMATED COUNT: 13.2 % (ref 12.3–15.4)
ERYTHROCYTE [DISTWIDTH] IN BLOOD BY AUTOMATED COUNT: 13.2 % (ref 12.3–15.4)
ERYTHROCYTE [DISTWIDTH] IN BLOOD BY AUTOMATED COUNT: 14.1 % (ref 12.3–15.4)
ERYTHROCYTE [DISTWIDTH] IN BLOOD BY AUTOMATED COUNT: 14.2 % (ref 12.3–15.4)
ERYTHROCYTE [DISTWIDTH] IN BLOOD BY AUTOMATED COUNT: 14.6 % (ref 12.3–15.4)
ERYTHROCYTE [DISTWIDTH] IN BLOOD BY AUTOMATED COUNT: 14.7 % (ref 12.3–15.4)
ERYTHROCYTE [DISTWIDTH] IN BLOOD BY AUTOMATED COUNT: 16.6 % (ref 12.3–15.4)
ERYTHROCYTE [DISTWIDTH] IN BLOOD BY AUTOMATED COUNT: 17 % (ref 12.3–15.4)
FLUAV RNA RESP QL NAA+PROBE: NOT DETECTED
FLUAV RNA RESP QL NAA+PROBE: NOT DETECTED
FLUBV RNA RESP QL NAA+PROBE: NOT DETECTED
FLUBV RNA RESP QL NAA+PROBE: NOT DETECTED
FOLATE SERPL-MCNC: 6.27 NG/ML (ref 4.78–24.2)
GLOBULIN UR ELPH-MCNC: 2.9 GM/DL
GLOBULIN UR ELPH-MCNC: 3.2 GM/DL
GLOBULIN UR ELPH-MCNC: 3.3 GM/DL
GLOBULIN UR ELPH-MCNC: 3.4 GM/DL
GLOBULIN UR ELPH-MCNC: 3.5 GM/DL
GLUCOSE SERPL-MCNC: 101 MG/DL (ref 65–99)
GLUCOSE SERPL-MCNC: 108 MG/DL (ref 65–99)
GLUCOSE SERPL-MCNC: 108 MG/DL (ref 65–99)
GLUCOSE SERPL-MCNC: 109 MG/DL (ref 65–99)
GLUCOSE SERPL-MCNC: 110 MG/DL (ref 65–99)
GLUCOSE SERPL-MCNC: 111 MG/DL (ref 65–99)
GLUCOSE SERPL-MCNC: 119 MG/DL (ref 65–99)
GLUCOSE SERPL-MCNC: 140 MG/DL (ref 65–99)
GLUCOSE SERPL-MCNC: 185 MG/DL (ref 65–99)
GLUCOSE UR STRIP-MCNC: NEGATIVE MG/DL
GLUCOSE UR STRIP-MCNC: NEGATIVE MG/DL
HCO3 BLDA-SCNC: 24.5 MMOL/L (ref 20–26)
HCT VFR BLD AUTO: 27.2 % (ref 34–46.6)
HCT VFR BLD AUTO: 27.9 % (ref 34–46.6)
HCT VFR BLD AUTO: 30.1 % (ref 34–46.6)
HCT VFR BLD AUTO: 30.7 % (ref 34–46.6)
HCT VFR BLD AUTO: 33.3 % (ref 34–46.6)
HCT VFR BLD AUTO: 37 % (ref 34–46.6)
HCT VFR BLD AUTO: 39.5 % (ref 34–46.6)
HCT VFR BLD AUTO: 40 % (ref 34–46.6)
HCT VFR BLD AUTO: 41.6 % (ref 34–46.6)
HCT VFR BLD CALC: 34.5 % (ref 38–51)
HGB BLD-MCNC: 10.2 G/DL (ref 12–15.9)
HGB BLD-MCNC: 10.5 G/DL (ref 12–15.9)
HGB BLD-MCNC: 11.6 G/DL (ref 12–15.9)
HGB BLD-MCNC: 12 G/DL (ref 12–15.9)
HGB BLD-MCNC: 13.7 G/DL (ref 12–15.9)
HGB BLD-MCNC: 14.8 G/DL (ref 12–15.9)
HGB BLD-MCNC: 9.1 G/DL (ref 12–15.9)
HGB BLD-MCNC: 9.3 G/DL (ref 12–15.9)
HGB BLD-MCNC: 9.4 G/DL (ref 12–15.9)
HGB BLDA-MCNC: 11.3 G/DL (ref 13.5–17.5)
HGB UR QL STRIP.AUTO: NEGATIVE
HGB UR QL STRIP.AUTO: NEGATIVE
HYALINE CASTS UR QL AUTO: ABNORMAL /LPF
IMM GRANULOCYTES # BLD AUTO: 0.01 10*3/MM3 (ref 0–0.05)
IMM GRANULOCYTES # BLD AUTO: 0.02 10*3/MM3 (ref 0–0.05)
IMM GRANULOCYTES # BLD AUTO: 0.03 10*3/MM3 (ref 0–0.05)
IMM GRANULOCYTES # BLD AUTO: 0.04 10*3/MM3 (ref 0–0.05)
IMM GRANULOCYTES # BLD AUTO: 0.06 10*3/MM3 (ref 0–0.05)
IMM GRANULOCYTES # BLD AUTO: 0.06 10*3/MM3 (ref 0–0.05)
IMM GRANULOCYTES # BLD AUTO: 0.1 10*3/MM3 (ref 0–0.05)
IMM GRANULOCYTES NFR BLD AUTO: 0.2 % (ref 0–0.5)
IMM GRANULOCYTES NFR BLD AUTO: 0.4 % (ref 0–0.5)
IMM GRANULOCYTES NFR BLD AUTO: 0.4 % (ref 0–0.5)
IMM GRANULOCYTES NFR BLD AUTO: 0.5 % (ref 0–0.5)
IMM GRANULOCYTES NFR BLD AUTO: 0.6 % (ref 0–0.5)
IMM GRANULOCYTES NFR BLD AUTO: 0.7 % (ref 0–0.5)
IMM GRANULOCYTES NFR BLD AUTO: 1 % (ref 0–0.5)
INHALED O2 CONCENTRATION: 21 %
INR PPP: 1.02 (ref 0.84–1.13)
INR PPP: 1.02 (ref 0.84–1.13)
KETONES UR QL STRIP: NEGATIVE
KETONES UR QL STRIP: NEGATIVE
LEFT ATRIUM VOLUME INDEX: 25.9 ML/M2
LEUKOCYTE ESTERASE UR QL STRIP.AUTO: ABNORMAL
LEUKOCYTE ESTERASE UR QL STRIP.AUTO: NEGATIVE
LV EF 2D ECHO EST: 66 %
LYMPHOCYTES # BLD AUTO: 1.08 10*3/MM3 (ref 0.7–3.1)
LYMPHOCYTES # BLD AUTO: 1.22 10*3/MM3 (ref 0.7–3.1)
LYMPHOCYTES # BLD AUTO: 1.23 10*3/MM3 (ref 0.7–3.1)
LYMPHOCYTES # BLD AUTO: 1.24 10*3/MM3 (ref 0.7–3.1)
LYMPHOCYTES # BLD AUTO: 1.4 10*3/MM3 (ref 0.7–3.1)
LYMPHOCYTES # BLD AUTO: 1.58 10*3/MM3 (ref 0.7–3.1)
LYMPHOCYTES # BLD AUTO: 2.09 10*3/MM3 (ref 0.7–3.1)
LYMPHOCYTES # BLD MANUAL: 1.85 10*3/MM3 (ref 0.7–3.1)
LYMPHOCYTES NFR BLD AUTO: 11.2 % (ref 19.6–45.3)
LYMPHOCYTES NFR BLD AUTO: 12.1 % (ref 19.6–45.3)
LYMPHOCYTES NFR BLD AUTO: 14.3 % (ref 19.6–45.3)
LYMPHOCYTES NFR BLD AUTO: 15.8 % (ref 19.6–45.3)
LYMPHOCYTES NFR BLD AUTO: 24.3 % (ref 19.6–45.3)
LYMPHOCYTES NFR BLD AUTO: 25 % (ref 19.6–45.3)
LYMPHOCYTES NFR BLD AUTO: 29.3 % (ref 19.6–45.3)
LYMPHOCYTES NFR BLD MANUAL: 11 % (ref 5–12)
Lab: ABNORMAL
MACROCYTES BLD QL SMEAR: ABNORMAL
MAGNESIUM SERPL-MCNC: 1.6 MG/DL (ref 1.6–2.4)
MAGNESIUM SERPL-MCNC: 1.9 MG/DL (ref 1.6–2.4)
MAGNESIUM SERPL-MCNC: 2.1 MG/DL (ref 1.6–2.4)
MAGNESIUM SERPL-MCNC: 2.3 MG/DL (ref 1.6–2.4)
MAXIMAL PREDICTED HEART RATE: 139 BPM
MCH RBC QN AUTO: 28.6 PG (ref 26.6–33)
MCH RBC QN AUTO: 31.1 PG (ref 26.6–33)
MCH RBC QN AUTO: 31.2 PG (ref 26.6–33)
MCH RBC QN AUTO: 31.4 PG (ref 26.6–33)
MCH RBC QN AUTO: 31.6 PG (ref 26.6–33)
MCH RBC QN AUTO: 31.7 PG (ref 26.6–33)
MCH RBC QN AUTO: 32.2 PG (ref 26.6–33)
MCH RBC QN AUTO: 32.3 PG (ref 26.6–33)
MCH RBC QN AUTO: 32.4 PG (ref 26.6–33)
MCHC RBC AUTO-ENTMCNC: 30.4 G/DL (ref 31.5–35.7)
MCHC RBC AUTO-ENTMCNC: 30.6 G/DL (ref 31.5–35.7)
MCHC RBC AUTO-ENTMCNC: 30.6 G/DL (ref 31.5–35.7)
MCHC RBC AUTO-ENTMCNC: 31.4 G/DL (ref 31.5–35.7)
MCHC RBC AUTO-ENTMCNC: 32.6 G/DL (ref 31.5–35.7)
MCHC RBC AUTO-ENTMCNC: 34.2 G/DL (ref 31.5–35.7)
MCHC RBC AUTO-ENTMCNC: 34.3 G/DL (ref 31.5–35.7)
MCHC RBC AUTO-ENTMCNC: 34.9 G/DL (ref 31.5–35.7)
MCHC RBC AUTO-ENTMCNC: 35.6 G/DL (ref 31.5–35.7)
MCV RBC AUTO: 101.5 FL (ref 79–97)
MCV RBC AUTO: 102.6 FL (ref 79–97)
MCV RBC AUTO: 102.7 FL (ref 79–97)
MCV RBC AUTO: 90.8 FL (ref 79–97)
MCV RBC AUTO: 91.1 FL (ref 79–97)
MCV RBC AUTO: 92.4 FL (ref 79–97)
MCV RBC AUTO: 92.9 FL (ref 79–97)
MCV RBC AUTO: 94.1 FL (ref 79–97)
MCV RBC AUTO: 97.2 FL (ref 79–97)
METHGB BLD QL: 0.1 % (ref 0–3)
MODALITY: ABNORMAL
MONOCYTES # BLD AUTO: 0.48 10*3/MM3 (ref 0.1–0.9)
MONOCYTES # BLD AUTO: 0.6 10*3/MM3 (ref 0.1–0.9)
MONOCYTES # BLD AUTO: 0.62 10*3/MM3 (ref 0.1–0.9)
MONOCYTES # BLD AUTO: 0.69 10*3/MM3 (ref 0.1–0.9)
MONOCYTES # BLD AUTO: 0.75 10*3/MM3 (ref 0.1–0.9)
MONOCYTES # BLD AUTO: 0.79 10*3/MM3 (ref 0.1–0.9)
MONOCYTES # BLD AUTO: 0.92 10*3/MM3 (ref 0.1–0.9)
MONOCYTES # BLD: 0.93 10*3/MM3 (ref 0.1–0.9)
MONOCYTES NFR BLD AUTO: 10.2 % (ref 5–12)
MONOCYTES NFR BLD AUTO: 11.1 % (ref 5–12)
MONOCYTES NFR BLD AUTO: 6.4 % (ref 5–12)
MONOCYTES NFR BLD AUTO: 8.3 % (ref 5–12)
MONOCYTES NFR BLD AUTO: 8.3 % (ref 5–12)
MONOCYTES NFR BLD AUTO: 8.7 % (ref 5–12)
MONOCYTES NFR BLD AUTO: 8.9 % (ref 5–12)
NEUTROPHILS # BLD AUTO: 5.3 10*3/MM3 (ref 1.7–7)
NEUTROPHILS NFR BLD AUTO: 2.98 10*3/MM3 (ref 1.7–7)
NEUTROPHILS NFR BLD AUTO: 3.8 10*3/MM3 (ref 1.7–7)
NEUTROPHILS NFR BLD AUTO: 5.3 10*3/MM3 (ref 1.7–7)
NEUTROPHILS NFR BLD AUTO: 5.66 10*3/MM3 (ref 1.7–7)
NEUTROPHILS NFR BLD AUTO: 55.1 % (ref 42.7–76)
NEUTROPHILS NFR BLD AUTO: 6.38 10*3/MM3 (ref 1.7–7)
NEUTROPHILS NFR BLD AUTO: 63.3 % (ref 42.7–76)
NEUTROPHILS NFR BLD AUTO: 65.8 % (ref 42.7–76)
NEUTROPHILS NFR BLD AUTO: 7.62 10*3/MM3 (ref 1.7–7)
NEUTROPHILS NFR BLD AUTO: 7.93 10*3/MM3 (ref 1.7–7)
NEUTROPHILS NFR BLD AUTO: 73.1 % (ref 42.7–76)
NEUTROPHILS NFR BLD AUTO: 73.9 % (ref 42.7–76)
NEUTROPHILS NFR BLD AUTO: 77.1 % (ref 42.7–76)
NEUTROPHILS NFR BLD AUTO: 79.1 % (ref 42.7–76)
NEUTROPHILS NFR BLD MANUAL: 59 % (ref 42.7–76)
NEUTS BAND NFR BLD MANUAL: 4 % (ref 0–5)
NITRITE UR QL STRIP: NEGATIVE
NITRITE UR QL STRIP: NEGATIVE
NOTE: ABNORMAL
NRBC BLD AUTO-RTO: 0 /100 WBC (ref 0–0.2)
NRBC BLD AUTO-RTO: 0.4 /100 WBC (ref 0–0.2)
NT-PROBNP SERPL-MCNC: 6816 PG/ML (ref 0–1800)
OXYHGB MFR BLDV: 95.4 % (ref 94–99)
PCO2 BLDA: 33 MM HG (ref 35–45)
PCO2 TEMP ADJ BLD: ABNORMAL MM[HG]
PH BLDA: 7.48 PH UNITS (ref 7.35–7.45)
PH UR STRIP.AUTO: 5.5 [PH] (ref 5–8)
PH UR STRIP.AUTO: 6.5 [PH] (ref 5–8)
PH, TEMP CORRECTED: ABNORMAL
PLAT MORPH BLD: NORMAL
PLATELET # BLD AUTO: 144 10*3/MM3 (ref 140–450)
PLATELET # BLD AUTO: 154 10*3/MM3 (ref 140–450)
PLATELET # BLD AUTO: 169 10*3/MM3 (ref 140–450)
PLATELET # BLD AUTO: 194 10*3/MM3 (ref 140–450)
PLATELET # BLD AUTO: 199 10*3/MM3 (ref 140–450)
PLATELET # BLD AUTO: 225 10*3/MM3 (ref 140–450)
PLATELET # BLD AUTO: 254 10*3/MM3 (ref 140–450)
PLATELET # BLD AUTO: 284 10*3/MM3 (ref 140–450)
PLATELET # BLD AUTO: 286 10*3/MM3 (ref 140–450)
PMV BLD AUTO: 10 FL (ref 6–12)
PMV BLD AUTO: 8.8 FL (ref 6–12)
PMV BLD AUTO: 8.9 FL (ref 6–12)
PMV BLD AUTO: 9 FL (ref 6–12)
PMV BLD AUTO: 9.2 FL (ref 6–12)
PMV BLD AUTO: 9.4 FL (ref 6–12)
PMV BLD AUTO: 9.5 FL (ref 6–12)
PMV BLD AUTO: 9.7 FL (ref 6–12)
PMV BLD AUTO: 9.8 FL (ref 6–12)
PO2 BLDA: 78.9 MM HG (ref 83–108)
PO2 TEMP ADJ BLD: ABNORMAL MM[HG]
POTASSIUM SERPL-SCNC: 3.5 MMOL/L (ref 3.5–5.2)
POTASSIUM SERPL-SCNC: 3.5 MMOL/L (ref 3.5–5.2)
POTASSIUM SERPL-SCNC: 3.8 MMOL/L (ref 3.5–5.2)
POTASSIUM SERPL-SCNC: 4 MMOL/L (ref 3.5–5.2)
POTASSIUM SERPL-SCNC: 4.1 MMOL/L (ref 3.5–5.2)
POTASSIUM SERPL-SCNC: 4.2 MMOL/L (ref 3.5–5.2)
POTASSIUM SERPL-SCNC: 5 MMOL/L (ref 3.5–5.2)
PROT SERPL-MCNC: 6 G/DL (ref 6–8.5)
PROT SERPL-MCNC: 6.1 G/DL (ref 6–8.5)
PROT SERPL-MCNC: 6.3 G/DL (ref 6–8.5)
PROT SERPL-MCNC: 6.8 G/DL (ref 6–8.5)
PROT SERPL-MCNC: 7.1 G/DL (ref 6–8.5)
PROT UR QL STRIP: NEGATIVE
PROT UR QL STRIP: NEGATIVE
PROTHROMBIN TIME: 13.3 SECONDS (ref 11.4–14.4)
PROTHROMBIN TIME: 13.3 SECONDS (ref 11.4–14.4)
QT INTERVAL: 314 MS
QT INTERVAL: 342 MS
QT INTERVAL: 352 MS
QT INTERVAL: 396 MS
QT INTERVAL: 446 MS
QTC INTERVAL: 465 MS
QTC INTERVAL: 467 MS
QTC INTERVAL: 473 MS
QTC INTERVAL: 477 MS
QTC INTERVAL: 481 MS
RBC # BLD AUTO: 2.87 10*6/MM3 (ref 3.77–5.28)
RBC # BLD AUTO: 2.89 10*6/MM3 (ref 3.77–5.28)
RBC # BLD AUTO: 2.99 10*6/MM3 (ref 3.77–5.28)
RBC # BLD AUTO: 3.24 10*6/MM3 (ref 3.77–5.28)
RBC # BLD AUTO: 3.28 10*6/MM3 (ref 3.77–5.28)
RBC # BLD AUTO: 3.85 10*6/MM3 (ref 3.77–5.28)
RBC # BLD AUTO: 4.06 10*6/MM3 (ref 3.77–5.28)
RBC # BLD AUTO: 4.33 10*6/MM3 (ref 3.77–5.28)
RBC # BLD AUTO: 4.58 10*6/MM3 (ref 3.77–5.28)
RBC # UR STRIP: ABNORMAL /HPF
REF LAB TEST METHOD: ABNORMAL
RH BLD: POSITIVE
RSV RNA NPH QL NAA+NON-PROBE: NOT DETECTED
SAO2 % BLDCOA: 96.6 % (ref 94–99)
SARS-COV-2 RNA RESP QL NAA+PROBE: NOT DETECTED
SARS-COV-2 RNA RESP QL NAA+PROBE: NOT DETECTED
SODIUM SERPL-SCNC: 131 MMOL/L (ref 136–145)
SODIUM SERPL-SCNC: 133 MMOL/L (ref 136–145)
SODIUM SERPL-SCNC: 134 MMOL/L (ref 136–145)
SODIUM SERPL-SCNC: 135 MMOL/L (ref 136–145)
SODIUM SERPL-SCNC: 137 MMOL/L (ref 136–145)
SODIUM SERPL-SCNC: 137 MMOL/L (ref 136–145)
SP GR UR STRIP: 1.02 (ref 1–1.03)
SP GR UR STRIP: <=1.005 (ref 1–1.03)
SQUAMOUS #/AREA URNS HPF: ABNORMAL /HPF
STRESS TARGET HR: 118 BPM
T&S EXPIRATION DATE: NORMAL
TROPONIN T SERPL-MCNC: <0.01 NG/ML (ref 0–0.03)
TSH SERPL DL<=0.05 MIU/L-ACNC: 2.17 UIU/ML (ref 0.27–4.2)
UROBILINOGEN UR QL STRIP: ABNORMAL
UROBILINOGEN UR QL STRIP: NORMAL
VARIANT LYMPHS NFR BLD MANUAL: 22 % (ref 19.6–45.3)
VENTILATOR MODE: ABNORMAL
VIT B12 BLD-MCNC: 1612 PG/ML (ref 211–946)
WBC # UR STRIP: ABNORMAL /HPF
WBC NRBC COR # BLD: 10.28 10*3/MM3 (ref 3.4–10.8)
WBC NRBC COR # BLD: 5.4 10*3/MM3 (ref 3.4–10.8)
WBC NRBC COR # BLD: 5.77 10*3/MM3 (ref 3.4–10.8)
WBC NRBC COR # BLD: 7.74 10*3/MM3 (ref 3.4–10.8)
WBC NRBC COR # BLD: 8.36 10*3/MM3 (ref 3.4–10.8)
WBC NRBC COR # BLD: 8.42 10*3/MM3 (ref 3.4–10.8)
WBC NRBC COR # BLD: 8.63 10*3/MM3 (ref 3.4–10.8)
WBC NRBC COR # BLD: 9.54 10*3/MM3 (ref 3.4–10.8)
WBC NRBC COR # BLD: 9.64 10*3/MM3 (ref 3.4–10.8)

## 2022-01-01 PROCEDURE — 97110 THERAPEUTIC EXERCISES: CPT

## 2022-01-01 PROCEDURE — 93010 ELECTROCARDIOGRAM REPORT: CPT | Performed by: INTERNAL MEDICINE

## 2022-01-01 PROCEDURE — 97530 THERAPEUTIC ACTIVITIES: CPT

## 2022-01-01 PROCEDURE — 99232 SBSQ HOSP IP/OBS MODERATE 35: CPT | Performed by: INTERNAL MEDICINE

## 2022-01-01 PROCEDURE — 97116 GAIT TRAINING THERAPY: CPT

## 2022-01-01 PROCEDURE — 80053 COMPREHEN METABOLIC PANEL: CPT | Performed by: INTERNAL MEDICINE

## 2022-01-01 PROCEDURE — 3E0T3BZ INTRODUCTION OF ANESTHETIC AGENT INTO PERIPHERAL NERVES AND PLEXI, PERCUTANEOUS APPROACH: ICD-10-PCS | Performed by: ANESTHESIOLOGY

## 2022-01-01 PROCEDURE — 97535 SELF CARE MNGMENT TRAINING: CPT

## 2022-01-01 PROCEDURE — 73502 X-RAY EXAM HIP UNI 2-3 VIEWS: CPT

## 2022-01-01 PROCEDURE — 71045 X-RAY EXAM CHEST 1 VIEW: CPT | Performed by: RADIOLOGY

## 2022-01-01 PROCEDURE — 83605 ASSAY OF LACTIC ACID: CPT | Performed by: NURSE PRACTITIONER

## 2022-01-01 PROCEDURE — 73700 CT LOWER EXTREMITY W/O DYE: CPT

## 2022-01-01 PROCEDURE — 99239 HOSP IP/OBS DSCHRG MGMT >30: CPT | Performed by: NURSE PRACTITIONER

## 2022-01-01 PROCEDURE — 83735 ASSAY OF MAGNESIUM: CPT | Performed by: FAMILY MEDICINE

## 2022-01-01 PROCEDURE — 84484 ASSAY OF TROPONIN QUANT: CPT | Performed by: STUDENT IN AN ORGANIZED HEALTH CARE EDUCATION/TRAINING PROGRAM

## 2022-01-01 PROCEDURE — 99233 SBSQ HOSP IP/OBS HIGH 50: CPT | Performed by: FAMILY MEDICINE

## 2022-01-01 PROCEDURE — 87636 SARSCOV2 & INF A&B AMP PRB: CPT | Performed by: NURSE PRACTITIONER

## 2022-01-01 PROCEDURE — 97161 PT EVAL LOW COMPLEX 20 MIN: CPT

## 2022-01-01 PROCEDURE — 85025 COMPLETE CBC W/AUTO DIFF WBC: CPT | Performed by: STUDENT IN AN ORGANIZED HEALTH CARE EDUCATION/TRAINING PROGRAM

## 2022-01-01 PROCEDURE — 73523 X-RAY EXAM HIPS BI 5/> VIEWS: CPT | Performed by: RADIOLOGY

## 2022-01-01 PROCEDURE — 96376 TX/PRO/DX INJ SAME DRUG ADON: CPT

## 2022-01-01 PROCEDURE — 85025 COMPLETE CBC W/AUTO DIFF WBC: CPT | Performed by: INTERNAL MEDICINE

## 2022-01-01 PROCEDURE — 25010000002 CEFAZOLIN IN DEXTROSE 2-4 GM/100ML-% SOLUTION: Performed by: ORTHOPAEDIC SURGERY

## 2022-01-01 PROCEDURE — 99308 SBSQ NF CARE LOW MDM 20: CPT | Performed by: INTERNAL MEDICINE

## 2022-01-01 PROCEDURE — 73120 X-RAY EXAM OF HAND: CPT

## 2022-01-01 PROCEDURE — 71045 X-RAY EXAM CHEST 1 VIEW: CPT

## 2022-01-01 PROCEDURE — 80048 BASIC METABOLIC PNL TOTAL CA: CPT | Performed by: INTERNAL MEDICINE

## 2022-01-01 PROCEDURE — 82607 VITAMIN B-12: CPT | Performed by: INTERNAL MEDICINE

## 2022-01-01 PROCEDURE — 73552 X-RAY EXAM OF FEMUR 2/>: CPT

## 2022-01-01 PROCEDURE — 25010000002 HYDROMORPHONE 1 MG/ML SOLUTION: Performed by: EMERGENCY MEDICINE

## 2022-01-01 PROCEDURE — 99231 SBSQ HOSP IP/OBS SF/LOW 25: CPT | Performed by: FAMILY MEDICINE

## 2022-01-01 PROCEDURE — C1713 ANCHOR/SCREW BN/BN,TIS/BN: HCPCS | Performed by: ORTHOPAEDIC SURGERY

## 2022-01-01 PROCEDURE — 93005 ELECTROCARDIOGRAM TRACING: CPT | Performed by: NURSE PRACTITIONER

## 2022-01-01 PROCEDURE — 82746 ASSAY OF FOLIC ACID SERUM: CPT | Performed by: INTERNAL MEDICINE

## 2022-01-01 PROCEDURE — 25010000002 HYDROMORPHONE PER 4 MG: Performed by: NURSE PRACTITIONER

## 2022-01-01 PROCEDURE — 83050 HGB METHEMOGLOBIN QUAN: CPT

## 2022-01-01 PROCEDURE — 84443 ASSAY THYROID STIM HORMONE: CPT | Performed by: INTERNAL MEDICINE

## 2022-01-01 PROCEDURE — 92950 HEART/LUNG RESUSCITATION CPR: CPT

## 2022-01-01 PROCEDURE — 99238 HOSP IP/OBS DSCHRG MGMT 30/<: CPT | Performed by: FAMILY MEDICINE

## 2022-01-01 PROCEDURE — 82375 ASSAY CARBOXYHB QUANT: CPT

## 2022-01-01 PROCEDURE — 83880 ASSAY OF NATRIURETIC PEPTIDE: CPT | Performed by: STUDENT IN AN ORGANIZED HEALTH CARE EDUCATION/TRAINING PROGRAM

## 2022-01-01 PROCEDURE — 25010000002 FENTANYL CITRATE (PF) 50 MCG/ML SOLUTION

## 2022-01-01 PROCEDURE — 63710000001 ONDANSETRON PER 8 MG: Performed by: FAMILY MEDICINE

## 2022-01-01 PROCEDURE — 99231 SBSQ HOSP IP/OBS SF/LOW 25: CPT | Performed by: INTERNAL MEDICINE

## 2022-01-01 PROCEDURE — 93005 ELECTROCARDIOGRAM TRACING: CPT | Performed by: INTERNAL MEDICINE

## 2022-01-01 PROCEDURE — 25010000002 ROPIVACAINE PER 1 MG: Performed by: ANESTHESIOLOGY

## 2022-01-01 PROCEDURE — 99284 EMERGENCY DEPT VISIT MOD MDM: CPT

## 2022-01-01 PROCEDURE — 72192 CT PELVIS W/O DYE: CPT

## 2022-01-01 PROCEDURE — 3E0T3BZ INTRODUCTION OF ANESTHETIC AGENT INTO PERIPHERAL NERVES AND PLEXI, PERCUTANEOUS APPROACH: ICD-10-PCS | Performed by: EMERGENCY MEDICINE

## 2022-01-01 PROCEDURE — 25010000002 ONDANSETRON PER 1 MG: Performed by: NURSE ANESTHETIST, CERTIFIED REGISTERED

## 2022-01-01 PROCEDURE — 25010000002 MAGNESIUM SULFATE IN D5W 1G/100ML (PREMIX) 1-5 GM/100ML-% SOLUTION: Performed by: INTERNAL MEDICINE

## 2022-01-01 PROCEDURE — 82550 ASSAY OF CK (CPK): CPT | Performed by: INTERNAL MEDICINE

## 2022-01-01 PROCEDURE — 25010000002 HYDROMORPHONE PER 4 MG: Performed by: EMERGENCY MEDICINE

## 2022-01-01 PROCEDURE — 85610 PROTHROMBIN TIME: CPT | Performed by: NURSE PRACTITIONER

## 2022-01-01 PROCEDURE — 99233 SBSQ HOSP IP/OBS HIGH 50: CPT | Performed by: INTERNAL MEDICINE

## 2022-01-01 PROCEDURE — 83735 ASSAY OF MAGNESIUM: CPT | Performed by: INTERNAL MEDICINE

## 2022-01-01 PROCEDURE — 86140 C-REACTIVE PROTEIN: CPT | Performed by: INTERNAL MEDICINE

## 2022-01-01 PROCEDURE — 73120 X-RAY EXAM OF HAND: CPT | Performed by: RADIOLOGY

## 2022-01-01 PROCEDURE — 93005 ELECTROCARDIOGRAM TRACING: CPT | Performed by: STUDENT IN AN ORGANIZED HEALTH CARE EDUCATION/TRAINING PROGRAM

## 2022-01-01 PROCEDURE — 87086 URINE CULTURE/COLONY COUNT: CPT | Performed by: NURSE PRACTITIONER

## 2022-01-01 PROCEDURE — 81001 URINALYSIS AUTO W/SCOPE: CPT | Performed by: NURSE PRACTITIONER

## 2022-01-01 PROCEDURE — 80053 COMPREHEN METABOLIC PANEL: CPT | Performed by: STUDENT IN AN ORGANIZED HEALTH CARE EDUCATION/TRAINING PROGRAM

## 2022-01-01 PROCEDURE — 86900 BLOOD TYPING SEROLOGIC ABO: CPT | Performed by: NURSE PRACTITIONER

## 2022-01-01 PROCEDURE — 93242 EXT ECG>48HR<7D RECORDING: CPT

## 2022-01-01 PROCEDURE — 25010000002 FENTANYL CITRATE (PF) 50 MCG/ML SOLUTION: Performed by: ANESTHESIOLOGY

## 2022-01-01 PROCEDURE — 96374 THER/PROPH/DIAG INJ IV PUSH: CPT

## 2022-01-01 PROCEDURE — 25010000002 HYDROMORPHONE PER 4 MG: Performed by: ORTHOPAEDIC SURGERY

## 2022-01-01 PROCEDURE — 83735 ASSAY OF MAGNESIUM: CPT | Performed by: NURSE PRACTITIONER

## 2022-01-01 PROCEDURE — 25010000002 ENOXAPARIN PER 10 MG: Performed by: ORTHOPAEDIC SURGERY

## 2022-01-01 PROCEDURE — 25010000002 PROPOFOL 10 MG/ML EMULSION: Performed by: NURSE ANESTHETIST, CERTIFIED REGISTERED

## 2022-01-01 PROCEDURE — 80048 BASIC METABOLIC PNL TOTAL CA: CPT | Performed by: ORTHOPAEDIC SURGERY

## 2022-01-01 PROCEDURE — 93306 TTE W/DOPPLER COMPLETE: CPT

## 2022-01-01 PROCEDURE — 97166 OT EVAL MOD COMPLEX 45 MIN: CPT

## 2022-01-01 PROCEDURE — 72110 X-RAY EXAM L-2 SPINE 4/>VWS: CPT

## 2022-01-01 PROCEDURE — 93306 TTE W/DOPPLER COMPLETE: CPT | Performed by: INTERNAL MEDICINE

## 2022-01-01 PROCEDURE — 99309 SBSQ NF CARE MODERATE MDM 30: CPT | Performed by: INTERNAL MEDICINE

## 2022-01-01 PROCEDURE — 72110 X-RAY EXAM L-2 SPINE 4/>VWS: CPT | Performed by: RADIOLOGY

## 2022-01-01 PROCEDURE — 80053 COMPREHEN METABOLIC PANEL: CPT | Performed by: EMERGENCY MEDICINE

## 2022-01-01 PROCEDURE — 97167 OT EVAL HIGH COMPLEX 60 MIN: CPT

## 2022-01-01 PROCEDURE — 85027 COMPLETE CBC AUTOMATED: CPT | Performed by: ORTHOPAEDIC SURGERY

## 2022-01-01 PROCEDURE — 87637 SARSCOV2&INF A&B&RSV AMP PRB: CPT | Performed by: INTERNAL MEDICINE

## 2022-01-01 PROCEDURE — 0SRS019 REPLACEMENT OF LEFT HIP JOINT, FEMORAL SURFACE WITH METAL SYNTHETIC SUBSTITUTE, CEMENTED, OPEN APPROACH: ICD-10-PCS | Performed by: ORTHOPAEDIC SURGERY

## 2022-01-01 PROCEDURE — 86850 RBC ANTIBODY SCREEN: CPT | Performed by: NURSE PRACTITIONER

## 2022-01-01 PROCEDURE — 73501 X-RAY EXAM HIP UNI 1 VIEW: CPT

## 2022-01-01 PROCEDURE — 86901 BLOOD TYPING SEROLOGIC RH(D): CPT | Performed by: NURSE PRACTITIONER

## 2022-01-01 PROCEDURE — 27236 TREAT THIGH FRACTURE: CPT | Performed by: PHYSICIAN ASSISTANT

## 2022-01-01 PROCEDURE — 36415 COLL VENOUS BLD VENIPUNCTURE: CPT

## 2022-01-01 PROCEDURE — 36600 WITHDRAWAL OF ARTERIAL BLOOD: CPT

## 2022-01-01 PROCEDURE — 25010000002 DEXAMETHASONE PER 1 MG: Performed by: NURSE ANESTHETIST, CERTIFIED REGISTERED

## 2022-01-01 PROCEDURE — 73523 X-RAY EXAM HIPS BI 5/> VIEWS: CPT

## 2022-01-01 PROCEDURE — 85025 COMPLETE CBC W/AUTO DIFF WBC: CPT | Performed by: NURSE PRACTITIONER

## 2022-01-01 PROCEDURE — 25010000002 FENTANYL CITRATE (PF) 50 MCG/ML SOLUTION: Performed by: NURSE ANESTHETIST, CERTIFIED REGISTERED

## 2022-01-01 PROCEDURE — C1776 JOINT DEVICE (IMPLANTABLE): HCPCS | Performed by: ORTHOPAEDIC SURGERY

## 2022-01-01 PROCEDURE — 93244 EXT ECG>48HR<7D REV&INTERPJ: CPT | Performed by: INTERNAL MEDICINE

## 2022-01-01 PROCEDURE — 85025 COMPLETE CBC W/AUTO DIFF WBC: CPT | Performed by: EMERGENCY MEDICINE

## 2022-01-01 PROCEDURE — 25010000002 DEXAMETHASONE SODIUM PHOSPHATE 10 MG/ML SOLUTION: Performed by: ANESTHESIOLOGY

## 2022-01-01 PROCEDURE — 80048 BASIC METABOLIC PNL TOTAL CA: CPT | Performed by: FAMILY MEDICINE

## 2022-01-01 PROCEDURE — 93005 ELECTROCARDIOGRAM TRACING: CPT | Performed by: FAMILY MEDICINE

## 2022-01-01 PROCEDURE — 85007 BL SMEAR W/DIFF WBC COUNT: CPT | Performed by: FAMILY MEDICINE

## 2022-01-01 PROCEDURE — 94799 UNLISTED PULMONARY SVC/PX: CPT

## 2022-01-01 PROCEDURE — 25010000002 EPINEPHRINE 1 MG/10ML SOLUTION PREFILLED SYRINGE: Performed by: EMERGENCY MEDICINE

## 2022-01-01 PROCEDURE — 82550 ASSAY OF CK (CPK): CPT | Performed by: NURSE PRACTITIONER

## 2022-01-01 PROCEDURE — 99285 EMERGENCY DEPT VISIT HI MDM: CPT

## 2022-01-01 PROCEDURE — 99232 SBSQ HOSP IP/OBS MODERATE 35: CPT | Performed by: FAMILY MEDICINE

## 2022-01-01 PROCEDURE — 0 MAGNESIUM SULFATE 4 GM/100ML SOLUTION: Performed by: FAMILY MEDICINE

## 2022-01-01 PROCEDURE — C1755 CATHETER, INTRASPINAL: HCPCS | Performed by: ORTHOPAEDIC SURGERY

## 2022-01-01 PROCEDURE — 99223 1ST HOSP IP/OBS HIGH 75: CPT | Performed by: INTERNAL MEDICINE

## 2022-01-01 PROCEDURE — 81003 URINALYSIS AUTO W/O SCOPE: CPT | Performed by: EMERGENCY MEDICINE

## 2022-01-01 PROCEDURE — 73502 X-RAY EXAM HIP UNI 2-3 VIEWS: CPT | Performed by: RADIOLOGY

## 2022-01-01 PROCEDURE — 85027 COMPLETE CBC AUTOMATED: CPT | Performed by: FAMILY MEDICINE

## 2022-01-01 PROCEDURE — 80048 BASIC METABOLIC PNL TOTAL CA: CPT | Performed by: NURSE PRACTITIONER

## 2022-01-01 PROCEDURE — 99223 1ST HOSP IP/OBS HIGH 75: CPT | Performed by: FAMILY MEDICINE

## 2022-01-01 PROCEDURE — 82805 BLOOD GASES W/O2 SATURATION: CPT

## 2022-01-01 DEVICE — DEV CONTRL TISS STRATAFIX SPIRAL PDO DBLARM 0 24X24CM: Type: IMPLANTABLE DEVICE | Site: HIP | Status: FUNCTIONAL

## 2022-01-01 DEVICE — IMPLANTABLE DEVICE: Type: IMPLANTABLE DEVICE | Site: HIP | Status: FUNCTIONAL

## 2022-01-01 DEVICE — COBALT CHROME 12/14 TAPER FEMORAL                                    HEAD 28MM + 8: Type: IMPLANTABLE DEVICE | Site: HIP | Status: FUNCTIONAL

## 2022-01-01 DEVICE — VIOLET ANTIBACTERIAL POLYDIOXANONE, KNOTLESS TISSUE CONTROL DEVICE
Type: IMPLANTABLE DEVICE | Site: HIP | Status: FUNCTIONAL
Brand: STRATAFIX

## 2022-01-01 DEVICE — FULL DOSE BONE CEMENT, 10 PACK CATALOG NUMBER IS 6191-1-010
Type: IMPLANTABLE DEVICE | Site: HIP | Status: FUNCTIONAL
Brand: SIMPLEX

## 2022-01-01 DEVICE — POLARSTEM STANDARD CEMENTED 2
Type: IMPLANTABLE DEVICE | Site: HIP | Status: FUNCTIONAL
Brand: POLARSTEM

## 2022-01-01 DEVICE — BONE PREPARATION KIT
Type: IMPLANTABLE DEVICE | Site: HIP | Status: FUNCTIONAL
Brand: BIOPREP

## 2022-01-01 DEVICE — TANDEM BIPOLAR COBALT CHROME 47MM                                    OUTER DIAMETER 28MM INNER DIAMETER
Type: IMPLANTABLE DEVICE | Site: HIP | Status: FUNCTIONAL
Brand: TANDEM

## 2022-01-01 RX ORDER — FENTANYL CITRATE 50 UG/ML
INJECTION, SOLUTION INTRAMUSCULAR; INTRAVENOUS
Status: COMPLETED | OUTPATIENT
Start: 2022-01-01 | End: 2022-01-01

## 2022-01-01 RX ORDER — HYDROCODONE BITARTRATE AND ACETAMINOPHEN 5; 325 MG/1; MG/1
1 TABLET ORAL EVERY 4 HOURS PRN
Status: DISCONTINUED | OUTPATIENT
Start: 2022-01-01 | End: 2022-01-01

## 2022-01-01 RX ORDER — SODIUM CHLORIDE 9 MG/ML
75 INJECTION, SOLUTION INTRAVENOUS CONTINUOUS
Status: DISCONTINUED | OUTPATIENT
Start: 2022-01-01 | End: 2022-01-01

## 2022-01-01 RX ORDER — DEXAMETHASONE SODIUM PHOSPHATE 4 MG/ML
INJECTION, SOLUTION INTRA-ARTICULAR; INTRALESIONAL; INTRAMUSCULAR; INTRAVENOUS; SOFT TISSUE AS NEEDED
Status: DISCONTINUED | OUTPATIENT
Start: 2022-01-01 | End: 2022-01-01 | Stop reason: SURG

## 2022-01-01 RX ORDER — CALCIUM CARBONATE 500(1250)
500 TABLET ORAL DAILY
Status: DISCONTINUED | OUTPATIENT
Start: 2022-01-01 | End: 2022-01-01 | Stop reason: HOSPADM

## 2022-01-01 RX ORDER — SODIUM CHLORIDE 0.9 % (FLUSH) 0.9 %
10 SYRINGE (ML) INJECTION AS NEEDED
Status: DISCONTINUED | OUTPATIENT
Start: 2022-01-01 | End: 2022-01-01 | Stop reason: HOSPADM

## 2022-01-01 RX ORDER — CETIRIZINE HYDROCHLORIDE 10 MG/1
10 TABLET ORAL DAILY
Status: ON HOLD | COMMUNITY
End: 2022-01-01 | Stop reason: SDUPTHER

## 2022-01-01 RX ORDER — CETIRIZINE HYDROCHLORIDE 10 MG/1
10 TABLET ORAL DAILY
Qty: 30 TABLET | Refills: 0 | Status: SHIPPED | OUTPATIENT
Start: 2022-01-01 | End: 2022-01-01

## 2022-01-01 RX ORDER — NITROGLYCERIN 0.4 MG/1
0.4 TABLET SUBLINGUAL
Qty: 20 TABLET | Refills: 12 | Status: SHIPPED | OUTPATIENT
Start: 2022-01-01 | End: 2022-01-01

## 2022-01-01 RX ORDER — TRAMADOL HYDROCHLORIDE 50 MG/1
100 TABLET ORAL 2 TIMES DAILY PRN
COMMUNITY
End: 2022-01-01 | Stop reason: HOSPADM

## 2022-01-01 RX ORDER — MAGNESIUM SULFATE HEPTAHYDRATE 40 MG/ML
2 INJECTION, SOLUTION INTRAVENOUS AS NEEDED
Status: DISCONTINUED | OUTPATIENT
Start: 2022-01-01 | End: 2022-01-01 | Stop reason: HOSPADM

## 2022-01-01 RX ORDER — EPINEPHRINE 0.1 MG/ML
SYRINGE (ML) INJECTION
Status: COMPLETED | OUTPATIENT
Start: 2022-01-01 | End: 2022-01-01

## 2022-01-01 RX ORDER — MELATONIN
1000
Status: ON HOLD | COMMUNITY
End: 2022-01-01 | Stop reason: SDUPTHER

## 2022-01-01 RX ORDER — BISACODYL 10 MG
10 SUPPOSITORY, RECTAL RECTAL DAILY PRN
Status: DISCONTINUED | OUTPATIENT
Start: 2022-01-01 | End: 2022-01-01 | Stop reason: HOSPADM

## 2022-01-01 RX ORDER — BISACODYL 5 MG/1
5 TABLET, DELAYED RELEASE ORAL DAILY PRN
Status: DISCONTINUED | OUTPATIENT
Start: 2022-01-01 | End: 2022-01-01 | Stop reason: HOSPADM

## 2022-01-01 RX ORDER — CETIRIZINE HYDROCHLORIDE 10 MG/1
10 TABLET ORAL DAILY
Status: DISCONTINUED | OUTPATIENT
Start: 2022-01-01 | End: 2022-01-01 | Stop reason: HOSPADM

## 2022-01-01 RX ORDER — SODIUM CHLORIDE 0.9 % (FLUSH) 0.9 %
10 SYRINGE (ML) INJECTION EVERY 12 HOURS SCHEDULED
Status: DISCONTINUED | OUTPATIENT
Start: 2022-01-01 | End: 2022-01-01 | Stop reason: HOSPADM

## 2022-01-01 RX ORDER — NALOXONE HCL 0.4 MG/ML
0.4 VIAL (ML) INJECTION
Status: DISCONTINUED | OUTPATIENT
Start: 2022-01-01 | End: 2022-01-01

## 2022-01-01 RX ORDER — METOPROLOL SUCCINATE 50 MG/1
100 TABLET, EXTENDED RELEASE ORAL
Status: DISCONTINUED | OUTPATIENT
Start: 2022-01-01 | End: 2022-01-01

## 2022-01-01 RX ORDER — VITAMIN E 268 MG
1 CAPSULE ORAL 2 TIMES DAILY
Qty: 30 CAPSULE | Refills: 0 | Status: SHIPPED | OUTPATIENT
Start: 2022-01-01 | End: 2022-01-01

## 2022-01-01 RX ORDER — CYCLOBENZAPRINE HCL 5 MG
5 TABLET ORAL 3 TIMES DAILY PRN
Start: 2022-01-01 | End: 2022-01-01

## 2022-01-01 RX ORDER — VALSARTAN 160 MG/1
160 TABLET ORAL DAILY
Status: DISCONTINUED | OUTPATIENT
Start: 2022-01-01 | End: 2022-01-01

## 2022-01-01 RX ORDER — POLYETHYLENE GLYCOL 3350 17 G/17G
17 POWDER, FOR SOLUTION ORAL DAILY
Qty: 15 PACKET | Refills: 0 | Status: SHIPPED | OUTPATIENT
Start: 2022-01-01 | End: 2022-01-01

## 2022-01-01 RX ORDER — MAGNESIUM HYDROXIDE 1200 MG/15ML
LIQUID ORAL AS NEEDED
Status: DISCONTINUED | OUTPATIENT
Start: 2022-01-01 | End: 2022-01-01 | Stop reason: HOSPADM

## 2022-01-01 RX ORDER — LANOLIN ALCOHOL/MO/W.PET/CERES
1000 CREAM (GRAM) TOPICAL
Qty: 30 TABLET | Refills: 0 | Status: SHIPPED | OUTPATIENT
Start: 2022-01-01 | End: 2022-01-01

## 2022-01-01 RX ORDER — AMOXICILLIN 250 MG
2 CAPSULE ORAL 2 TIMES DAILY
Status: DISCONTINUED | OUTPATIENT
Start: 2022-01-01 | End: 2022-01-01 | Stop reason: HOSPADM

## 2022-01-01 RX ORDER — ENOXAPARIN SODIUM 100 MG/ML
30 INJECTION SUBCUTANEOUS EVERY 12 HOURS
Status: DISCONTINUED | OUTPATIENT
Start: 2022-01-01 | End: 2022-01-01

## 2022-01-01 RX ORDER — ACETAMINOPHEN 650 MG/1
650 SUPPOSITORY RECTAL EVERY 4 HOURS PRN
Status: DISCONTINUED | OUTPATIENT
Start: 2022-01-01 | End: 2022-01-01

## 2022-01-01 RX ORDER — METOPROLOL SUCCINATE 100 MG/1
100 TABLET, EXTENDED RELEASE ORAL DAILY
Status: ON HOLD | COMMUNITY
End: 2022-01-01 | Stop reason: SDUPTHER

## 2022-01-01 RX ORDER — HYDROMORPHONE HYDROCHLORIDE 1 MG/ML
0.5 INJECTION, SOLUTION INTRAMUSCULAR; INTRAVENOUS; SUBCUTANEOUS
Status: DISCONTINUED | OUTPATIENT
Start: 2022-01-01 | End: 2022-01-01

## 2022-01-01 RX ORDER — VALSARTAN 80 MG/1
80 TABLET ORAL
Qty: 30 TABLET | Refills: 0 | Status: SHIPPED | OUTPATIENT
Start: 2022-01-01 | End: 2022-01-01

## 2022-01-01 RX ORDER — METOPROLOL SUCCINATE 25 MG/1
25 TABLET, EXTENDED RELEASE ORAL EVERY 12 HOURS SCHEDULED
Status: DISCONTINUED | OUTPATIENT
Start: 2022-01-01 | End: 2022-01-01

## 2022-01-01 RX ORDER — ACETAMINOPHEN 325 MG/1
650 TABLET ORAL EVERY 4 HOURS PRN
Status: DISCONTINUED | OUTPATIENT
Start: 2022-01-01 | End: 2022-01-01 | Stop reason: HOSPADM

## 2022-01-01 RX ORDER — TRANEXAMIC ACID 10 MG/ML
1000 INJECTION, SOLUTION INTRAVENOUS ONCE
Status: DISCONTINUED | OUTPATIENT
Start: 2022-01-01 | End: 2022-01-01 | Stop reason: HOSPADM

## 2022-01-01 RX ORDER — MIDODRINE HYDROCHLORIDE 5 MG/1
2.5 TABLET ORAL
Status: DISCONTINUED | OUTPATIENT
Start: 2022-01-01 | End: 2022-01-01

## 2022-01-01 RX ORDER — LANOLIN ALCOHOL/MO/W.PET/CERES
25 CREAM (GRAM) TOPICAL 3 TIMES DAILY
Status: DISCONTINUED | OUTPATIENT
Start: 2022-01-01 | End: 2022-01-01 | Stop reason: HOSPADM

## 2022-01-01 RX ORDER — NITROGLYCERIN 0.4 MG/1
0.4 TABLET SUBLINGUAL
Status: DISCONTINUED | OUTPATIENT
Start: 2022-01-01 | End: 2022-01-01 | Stop reason: HOSPADM

## 2022-01-01 RX ORDER — POLYETHYLENE GLYCOL 3350 17 G/17G
17 POWDER, FOR SOLUTION ORAL DAILY PRN
Status: DISCONTINUED | OUTPATIENT
Start: 2022-01-01 | End: 2022-01-01 | Stop reason: HOSPADM

## 2022-01-01 RX ORDER — OXYCODONE HYDROCHLORIDE 5 MG/1
5 TABLET ORAL EVERY 4 HOURS PRN
Status: DISCONTINUED | OUTPATIENT
Start: 2022-01-01 | End: 2022-01-01 | Stop reason: HOSPADM

## 2022-01-01 RX ORDER — PYRIDOXINE HCL (VITAMIN B6) 25 MG
25 TABLET ORAL 3 TIMES DAILY
Qty: 90 TABLET | Refills: 0 | Status: SHIPPED | OUTPATIENT
Start: 2022-01-01 | End: 2022-01-01

## 2022-01-01 RX ORDER — METOPROLOL SUCCINATE 100 MG/1
50 TABLET, EXTENDED RELEASE ORAL NIGHTLY
COMMUNITY
End: 2022-01-01 | Stop reason: HOSPADM

## 2022-01-01 RX ORDER — ISOSORBIDE MONONITRATE 120 MG/1
60 TABLET, EXTENDED RELEASE ORAL DAILY
COMMUNITY
End: 2022-01-01 | Stop reason: HOSPADM

## 2022-01-01 RX ORDER — BUPIVACAINE HYDROCHLORIDE 2.5 MG/ML
30 INJECTION, SOLUTION EPIDURAL; INFILTRATION; INTRACAUDAL ONCE
Status: COMPLETED | OUTPATIENT
Start: 2022-01-01 | End: 2022-01-01

## 2022-01-01 RX ORDER — PYRIDOXINE HCL (VITAMIN B6) 25 MG
25 TABLET ORAL 3 TIMES DAILY
Status: ON HOLD
Start: 2022-01-01 | End: 2022-01-01

## 2022-01-01 RX ORDER — AMLODIPINE BESYLATE 5 MG/1
5 TABLET ORAL DAILY
Status: DISCONTINUED | OUTPATIENT
Start: 2022-01-01 | End: 2022-01-01

## 2022-01-01 RX ORDER — OXYCODONE HYDROCHLORIDE 5 MG/1
5 TABLET ORAL EVERY 6 HOURS
Qty: 17 TABLET | Refills: 0 | Status: ON HOLD
Start: 2022-01-01 | End: 2022-01-01

## 2022-01-01 RX ORDER — DIPHENOXYLATE HYDROCHLORIDE AND ATROPINE SULFATE 2.5; .025 MG/1; MG/1
1 TABLET ORAL DAILY
Status: DISCONTINUED | OUTPATIENT
Start: 2022-01-01 | End: 2022-01-01 | Stop reason: HOSPADM

## 2022-01-01 RX ORDER — LIDOCAINE HYDROCHLORIDE 20 MG/ML
5 INJECTION, SOLUTION INFILTRATION; PERINEURAL ONCE
Status: COMPLETED | OUTPATIENT
Start: 2022-01-01 | End: 2022-01-01

## 2022-01-01 RX ORDER — ONDANSETRON 4 MG/1
4 TABLET, FILM COATED ORAL EVERY 6 HOURS PRN
Qty: 10 TABLET | Refills: 0 | Status: SHIPPED | OUTPATIENT
Start: 2022-01-01 | End: 2022-01-01

## 2022-01-01 RX ORDER — METOPROLOL SUCCINATE 50 MG/1
50 TABLET, EXTENDED RELEASE ORAL EVERY 12 HOURS SCHEDULED
Status: DISCONTINUED | OUTPATIENT
Start: 2022-01-01 | End: 2022-01-01

## 2022-01-01 RX ORDER — ATORVASTATIN CALCIUM 40 MG/1
80 TABLET, FILM COATED ORAL DAILY
Status: DISCONTINUED | OUTPATIENT
Start: 2022-01-01 | End: 2022-01-01 | Stop reason: HOSPADM

## 2022-01-01 RX ORDER — ROPIVACAINE HYDROCHLORIDE 2 MG/ML
INJECTION, SOLUTION EPIDURAL; INFILTRATION; PERINEURAL CONTINUOUS
Status: DISCONTINUED | OUTPATIENT
Start: 2022-01-01 | End: 2022-01-01

## 2022-01-01 RX ORDER — CLOPIDOGREL BISULFATE 75 MG/1
75 TABLET ORAL DAILY
Status: DISCONTINUED | OUTPATIENT
Start: 2022-01-01 | End: 2022-01-01 | Stop reason: HOSPADM

## 2022-01-01 RX ORDER — ONDANSETRON 2 MG/ML
4 INJECTION INTRAMUSCULAR; INTRAVENOUS EVERY 6 HOURS PRN
Status: DISCONTINUED | OUTPATIENT
Start: 2022-01-01 | End: 2022-01-01 | Stop reason: HOSPADM

## 2022-01-01 RX ORDER — FAMOTIDINE 20 MG/1
20 TABLET, FILM COATED ORAL DAILY
Status: DISCONTINUED | OUTPATIENT
Start: 2022-01-01 | End: 2022-01-01 | Stop reason: HOSPADM

## 2022-01-01 RX ORDER — ACETAMINOPHEN 500 MG
1000 TABLET ORAL EVERY 8 HOURS
Status: DISCONTINUED | OUTPATIENT
Start: 2022-01-01 | End: 2022-01-01 | Stop reason: HOSPADM

## 2022-01-01 RX ORDER — BISACODYL 5 MG/1
10 TABLET, DELAYED RELEASE ORAL DAILY PRN
Qty: 10 TABLET | Refills: 0 | Status: SHIPPED | OUTPATIENT
Start: 2022-01-01 | End: 2022-01-01

## 2022-01-01 RX ORDER — VALSARTAN 80 MG/1
40 TABLET ORAL
Status: DISCONTINUED | OUTPATIENT
Start: 2022-01-01 | End: 2022-01-01 | Stop reason: HOSPADM

## 2022-01-01 RX ORDER — VALSARTAN 160 MG/1
160 TABLET ORAL
Status: DISCONTINUED | OUTPATIENT
Start: 2022-01-01 | End: 2022-01-01

## 2022-01-01 RX ORDER — FENTANYL CITRATE 50 UG/ML
50 INJECTION, SOLUTION INTRAMUSCULAR; INTRAVENOUS
Status: DISCONTINUED | OUTPATIENT
Start: 2022-01-01 | End: 2022-01-01 | Stop reason: HOSPADM

## 2022-01-01 RX ORDER — GABAPENTIN 100 MG/1
100 CAPSULE ORAL NIGHTLY
COMMUNITY
End: 2022-01-01 | Stop reason: HOSPADM

## 2022-01-01 RX ORDER — CLOPIDOGREL BISULFATE 75 MG/1
75 TABLET ORAL DAILY
Qty: 30 TABLET | Refills: 0 | Status: SHIPPED | OUTPATIENT
Start: 2022-01-01 | End: 2022-01-01

## 2022-01-01 RX ORDER — ALBUTEROL SULFATE 2.5 MG/3ML
2.5 SOLUTION RESPIRATORY (INHALATION) ONCE AS NEEDED
Status: DISCONTINUED | OUTPATIENT
Start: 2022-01-01 | End: 2022-01-01 | Stop reason: HOSPADM

## 2022-01-01 RX ORDER — FAMOTIDINE 10 MG/ML
20 INJECTION, SOLUTION INTRAVENOUS
Status: COMPLETED | OUTPATIENT
Start: 2022-01-01 | End: 2022-01-01

## 2022-01-01 RX ORDER — VITAMIN E 268 MG
1 CAPSULE ORAL 2 TIMES DAILY
Status: ON HOLD | COMMUNITY
End: 2022-01-01 | Stop reason: SDUPTHER

## 2022-01-01 RX ORDER — ATORVASTATIN CALCIUM 80 MG/1
80 TABLET, FILM COATED ORAL NIGHTLY
Status: ON HOLD | COMMUNITY
End: 2022-01-01 | Stop reason: SDUPTHER

## 2022-01-01 RX ORDER — LIDOCAINE HYDROCHLORIDE 10 MG/ML
INJECTION, SOLUTION EPIDURAL; INFILTRATION; INTRACAUDAL; PERINEURAL AS NEEDED
Status: DISCONTINUED | OUTPATIENT
Start: 2022-01-01 | End: 2022-01-01 | Stop reason: SURG

## 2022-01-01 RX ORDER — LANOLIN ALCOHOL/MO/W.PET/CERES
1000 CREAM (GRAM) TOPICAL DAILY
Status: DISCONTINUED | OUTPATIENT
Start: 2022-01-01 | End: 2022-01-01 | Stop reason: HOSPADM

## 2022-01-01 RX ORDER — HYDROMORPHONE HYDROCHLORIDE 1 MG/ML
0.5 INJECTION, SOLUTION INTRAMUSCULAR; INTRAVENOUS; SUBCUTANEOUS
Status: DISCONTINUED | OUTPATIENT
Start: 2022-01-01 | End: 2022-01-01 | Stop reason: HOSPADM

## 2022-01-01 RX ORDER — TRANEXAMIC ACID 10 MG/ML
1000 INJECTION, SOLUTION INTRAVENOUS ONCE
Status: COMPLETED | OUTPATIENT
Start: 2022-01-01 | End: 2022-01-01

## 2022-01-01 RX ORDER — BUPIVACAINE HCL/0.9 % NACL/PF 0.125 %
PLASTIC BAG, INJECTION (ML) EPIDURAL AS NEEDED
Status: DISCONTINUED | OUTPATIENT
Start: 2022-01-01 | End: 2022-01-01 | Stop reason: SURG

## 2022-01-01 RX ORDER — MAGNESIUM SULFATE HEPTAHYDRATE 40 MG/ML
4 INJECTION, SOLUTION INTRAVENOUS AS NEEDED
Status: DISCONTINUED | OUTPATIENT
Start: 2022-01-01 | End: 2022-01-01 | Stop reason: HOSPADM

## 2022-01-01 RX ORDER — FAMOTIDINE 20 MG/1
20 TABLET, FILM COATED ORAL 2 TIMES DAILY
Status: ON HOLD | COMMUNITY
End: 2022-01-01 | Stop reason: SDUPTHER

## 2022-01-01 RX ORDER — RANOLAZINE 500 MG/1
500 TABLET, EXTENDED RELEASE ORAL 2 TIMES DAILY
COMMUNITY
End: 2022-01-01 | Stop reason: HOSPADM

## 2022-01-01 RX ORDER — DIGOXIN 125 MCG
125 TABLET ORAL
Status: DISCONTINUED | OUTPATIENT
Start: 2022-01-01 | End: 2022-01-01

## 2022-01-01 RX ORDER — SODIUM CHLORIDE 9 MG/ML
100 INJECTION, SOLUTION INTRAVENOUS CONTINUOUS
Status: ACTIVE | OUTPATIENT
Start: 2022-01-01 | End: 2022-01-01

## 2022-01-01 RX ORDER — ROPIVACAINE HYDROCHLORIDE 2 MG/ML
INJECTION, SOLUTION EPIDURAL; INFILTRATION; PERINEURAL
Status: COMPLETED | OUTPATIENT
Start: 2022-01-01 | End: 2022-01-01

## 2022-01-01 RX ORDER — ACETAMINOPHEN 325 MG/1
650 TABLET ORAL EVERY 4 HOURS PRN
Status: DISCONTINUED | OUTPATIENT
Start: 2022-01-01 | End: 2022-01-01

## 2022-01-01 RX ORDER — METOPROLOL SUCCINATE 100 MG/1
100 TABLET, EXTENDED RELEASE ORAL DAILY
Qty: 30 TABLET | Refills: 0 | Status: SHIPPED | OUTPATIENT
Start: 2022-01-01 | End: 2022-01-01

## 2022-01-01 RX ORDER — ACETAMINOPHEN 500 MG
1000 TABLET ORAL EVERY 8 HOURS
Status: ON HOLD
Start: 2022-01-01 | End: 2022-01-01

## 2022-01-01 RX ORDER — ONDANSETRON 4 MG/1
4 TABLET, FILM COATED ORAL EVERY 6 HOURS PRN
Status: DISCONTINUED | OUTPATIENT
Start: 2022-01-01 | End: 2022-01-01 | Stop reason: HOSPADM

## 2022-01-01 RX ORDER — LABETALOL HYDROCHLORIDE 5 MG/ML
20 INJECTION, SOLUTION INTRAVENOUS ONCE
Status: COMPLETED | OUTPATIENT
Start: 2022-01-01 | End: 2022-01-01

## 2022-01-01 RX ORDER — VALSARTAN 160 MG/1
320 TABLET ORAL DAILY
Status: DISCONTINUED | OUTPATIENT
Start: 2022-01-01 | End: 2022-01-01

## 2022-01-01 RX ORDER — CYCLOBENZAPRINE HCL 10 MG
5 TABLET ORAL 3 TIMES DAILY
Status: COMPLETED | OUTPATIENT
Start: 2022-01-01 | End: 2022-01-01

## 2022-01-01 RX ORDER — FENTANYL CITRATE 50 UG/ML
INJECTION, SOLUTION INTRAMUSCULAR; INTRAVENOUS
Status: COMPLETED
Start: 2022-01-01 | End: 2022-01-01

## 2022-01-01 RX ORDER — CEFAZOLIN SODIUM 2 G/100ML
2 INJECTION, SOLUTION INTRAVENOUS EVERY 8 HOURS
Status: COMPLETED | OUTPATIENT
Start: 2022-01-01 | End: 2022-01-01

## 2022-01-01 RX ORDER — CYCLOSPORINE 0.5 MG/ML
1 EMULSION OPHTHALMIC 2 TIMES DAILY
COMMUNITY

## 2022-01-01 RX ORDER — VALSARTAN 320 MG/1
320 TABLET ORAL DAILY
COMMUNITY
End: 2022-01-01 | Stop reason: HOSPADM

## 2022-01-01 RX ORDER — METOPROLOL SUCCINATE 25 MG/1
25 TABLET, EXTENDED RELEASE ORAL
Qty: 30 TABLET | Refills: 0 | Status: SHIPPED | OUTPATIENT
Start: 2022-01-01 | End: 2022-01-01

## 2022-01-01 RX ORDER — LORAZEPAM 0.5 MG/1
0.5 TABLET ORAL EVERY 6 HOURS PRN
Status: DISCONTINUED | OUTPATIENT
Start: 2022-01-01 | End: 2022-01-01 | Stop reason: HOSPADM

## 2022-01-01 RX ORDER — FAMOTIDINE 20 MG/1
20 TABLET, FILM COATED ORAL
Status: DISCONTINUED | OUTPATIENT
Start: 2022-01-01 | End: 2022-01-01 | Stop reason: HOSPADM

## 2022-01-01 RX ORDER — ONDANSETRON 2 MG/ML
4 INJECTION INTRAMUSCULAR; INTRAVENOUS ONCE AS NEEDED
Status: DISCONTINUED | OUTPATIENT
Start: 2022-01-01 | End: 2022-01-01 | Stop reason: HOSPADM

## 2022-01-01 RX ORDER — LANOLIN ALCOHOL/MO/W.PET/CERES
1000 CREAM (GRAM) TOPICAL
Status: ON HOLD | COMMUNITY
End: 2022-01-01 | Stop reason: SDUPTHER

## 2022-01-01 RX ORDER — AMLODIPINE BESYLATE 2.5 MG/1
2.5 TABLET ORAL DAILY
Status: DISCONTINUED | OUTPATIENT
Start: 2022-01-01 | End: 2022-01-01

## 2022-01-01 RX ORDER — MORPHINE SULFATE 2 MG/ML
2 INJECTION, SOLUTION INTRAMUSCULAR; INTRAVENOUS EVERY 4 HOURS PRN
Status: DISCONTINUED | OUTPATIENT
Start: 2022-01-01 | End: 2022-01-01

## 2022-01-01 RX ORDER — AMLODIPINE BESYLATE 5 MG/1
5 TABLET ORAL NIGHTLY
COMMUNITY
End: 2022-01-01 | Stop reason: HOSPADM

## 2022-01-01 RX ORDER — IBUPROFEN 200 MG
1 TABLET ORAL EVERY 12 HOURS SCHEDULED
Status: DISCONTINUED | OUTPATIENT
Start: 2022-01-01 | End: 2022-01-01 | Stop reason: HOSPADM

## 2022-01-01 RX ORDER — MAGNESIUM SULFATE 1 G/100ML
1 INJECTION INTRAVENOUS ONCE
Status: COMPLETED | OUTPATIENT
Start: 2022-01-01 | End: 2022-01-01

## 2022-01-01 RX ORDER — DEXAMETHASONE SODIUM PHOSPHATE 10 MG/ML
INJECTION, SOLUTION INTRAMUSCULAR; INTRAVENOUS
Status: COMPLETED | OUTPATIENT
Start: 2022-01-01 | End: 2022-01-01

## 2022-01-01 RX ORDER — SODIUM CHLORIDE, SODIUM LACTATE, POTASSIUM CHLORIDE, CALCIUM CHLORIDE 600; 310; 30; 20 MG/100ML; MG/100ML; MG/100ML; MG/100ML
9 INJECTION, SOLUTION INTRAVENOUS CONTINUOUS PRN
Status: DISCONTINUED | OUTPATIENT
Start: 2022-01-01 | End: 2022-01-01

## 2022-01-01 RX ORDER — ASPIRIN 81 MG/1
81 TABLET ORAL NIGHTLY
COMMUNITY
End: 2022-01-01 | Stop reason: HOSPADM

## 2022-01-01 RX ORDER — CALCIUM CHLORIDE 100 MG/ML
INJECTION INTRAVENOUS; INTRAVENTRICULAR
Status: COMPLETED | OUTPATIENT
Start: 2022-01-01 | End: 2022-01-01

## 2022-01-01 RX ORDER — METOPROLOL SUCCINATE 100 MG/1
100 TABLET, EXTENDED RELEASE ORAL EVERY 12 HOURS SCHEDULED
Status: DISCONTINUED | OUTPATIENT
Start: 2022-01-01 | End: 2022-01-01 | Stop reason: HOSPADM

## 2022-01-01 RX ORDER — ROCURONIUM BROMIDE 10 MG/ML
INJECTION, SOLUTION INTRAVENOUS AS NEEDED
Status: DISCONTINUED | OUTPATIENT
Start: 2022-01-01 | End: 2022-01-01 | Stop reason: SURG

## 2022-01-01 RX ORDER — BISACODYL 5 MG/1
10 TABLET, DELAYED RELEASE ORAL DAILY PRN
Status: DISCONTINUED | OUTPATIENT
Start: 2022-01-01 | End: 2022-01-01 | Stop reason: HOSPADM

## 2022-01-01 RX ORDER — CLOPIDOGREL BISULFATE 75 MG/1
75 TABLET ORAL DAILY
Status: ON HOLD | COMMUNITY
End: 2022-01-01 | Stop reason: SDUPTHER

## 2022-01-01 RX ORDER — ONDANSETRON 2 MG/ML
INJECTION INTRAMUSCULAR; INTRAVENOUS AS NEEDED
Status: DISCONTINUED | OUTPATIENT
Start: 2022-01-01 | End: 2022-01-01 | Stop reason: SURG

## 2022-01-01 RX ORDER — VALSARTAN 160 MG/1
320 TABLET ORAL
Status: DISCONTINUED | OUTPATIENT
Start: 2022-01-01 | End: 2022-01-01

## 2022-01-01 RX ORDER — DIPHENOXYLATE HYDROCHLORIDE AND ATROPINE SULFATE 2.5; .025 MG/1; MG/1
1 TABLET ORAL DAILY
Qty: 30 TABLET | Refills: 0 | Status: SHIPPED | OUTPATIENT
Start: 2022-01-01 | End: 2022-01-01

## 2022-01-01 RX ORDER — CYCLOBENZAPRINE HCL 10 MG
5 TABLET ORAL 3 TIMES DAILY PRN
Status: DISCONTINUED | OUTPATIENT
Start: 2022-01-01 | End: 2022-01-01 | Stop reason: HOSPADM

## 2022-01-01 RX ORDER — CYCLOBENZAPRINE HCL 10 MG
5 TABLET ORAL EVERY 8 HOURS PRN
Status: DISCONTINUED | OUTPATIENT
Start: 2022-01-01 | End: 2022-01-01

## 2022-01-01 RX ORDER — POLYETHYLENE GLYCOL 3350 17 G/17G
17 POWDER, FOR SOLUTION ORAL DAILY
Status: DISCONTINUED | OUTPATIENT
Start: 2022-01-01 | End: 2022-01-01 | Stop reason: HOSPADM

## 2022-01-01 RX ORDER — FAMOTIDINE 20 MG/1
20 TABLET, FILM COATED ORAL
Status: DISCONTINUED | OUTPATIENT
Start: 2022-01-01 | End: 2022-01-01

## 2022-01-01 RX ORDER — FAMOTIDINE 20 MG/1
20 TABLET, FILM COATED ORAL 2 TIMES DAILY
Qty: 60 TABLET | Refills: 0 | Status: SHIPPED | OUTPATIENT
Start: 2022-01-01 | End: 2022-01-01

## 2022-01-01 RX ORDER — METOPROLOL SUCCINATE 50 MG/1
100 TABLET, EXTENDED RELEASE ORAL EVERY 12 HOURS SCHEDULED
Status: DISCONTINUED | OUTPATIENT
Start: 2022-01-01 | End: 2022-01-01

## 2022-01-01 RX ORDER — METOPROLOL SUCCINATE 100 MG/1
100 TABLET, EXTENDED RELEASE ORAL EVERY 12 HOURS SCHEDULED
Start: 2022-01-01 | End: 2022-01-01

## 2022-01-01 RX ORDER — ATORVASTATIN CALCIUM 80 MG/1
80 TABLET, FILM COATED ORAL NIGHTLY
Qty: 90 TABLET | Refills: 0 | Status: SHIPPED | OUTPATIENT
Start: 2022-01-01 | End: 2022-01-01

## 2022-01-01 RX ORDER — OXYCODONE HYDROCHLORIDE 5 MG/1
5 TABLET ORAL EVERY 6 HOURS PRN
Qty: 15 TABLET | Refills: 0 | Status: SHIPPED | OUTPATIENT
Start: 2022-01-01 | End: 2022-01-01

## 2022-01-01 RX ORDER — CALCIUM CARBONATE 500(1250)
500 TABLET ORAL DAILY
Qty: 30 TABLET | Refills: 0 | Status: SHIPPED | OUTPATIENT
Start: 2022-01-01 | End: 2022-01-01

## 2022-01-01 RX ORDER — ROPIVACAINE HYDROCHLORIDE 2 MG/ML
INJECTION, SOLUTION EPIDURAL; INFILTRATION; PERINEURAL
Status: DISCONTINUED | OUTPATIENT
Start: 2022-01-01 | End: 2022-01-01 | Stop reason: SURG

## 2022-01-01 RX ORDER — CYCLOBENZAPRINE HCL 5 MG
5 TABLET ORAL 3 TIMES DAILY PRN
Qty: 20 TABLET | Refills: 0 | Status: SHIPPED | OUTPATIENT
Start: 2022-01-01 | End: 2022-01-01

## 2022-01-01 RX ORDER — PROPOFOL 10 MG/ML
VIAL (ML) INTRAVENOUS AS NEEDED
Status: DISCONTINUED | OUTPATIENT
Start: 2022-01-01 | End: 2022-01-01 | Stop reason: SURG

## 2022-01-01 RX ORDER — OXYCODONE HYDROCHLORIDE 5 MG/1
5 TABLET ORAL EVERY 4 HOURS PRN
Refills: 0 | Status: ON HOLD
Start: 2022-01-01 | End: 2022-01-01

## 2022-01-01 RX ORDER — HYDROMORPHONE HYDROCHLORIDE 1 MG/ML
0.5 INJECTION, SOLUTION INTRAMUSCULAR; INTRAVENOUS; SUBCUTANEOUS
Status: COMPLETED | OUTPATIENT
Start: 2022-01-01 | End: 2022-01-01

## 2022-01-01 RX ORDER — OXYCODONE HYDROCHLORIDE 5 MG/1
5 TABLET ORAL EVERY 6 HOURS
Status: DISCONTINUED | OUTPATIENT
Start: 2022-01-01 | End: 2022-01-01 | Stop reason: HOSPADM

## 2022-01-01 RX ORDER — CEFAZOLIN SODIUM 2 G/100ML
2 INJECTION, SOLUTION INTRAVENOUS ONCE
Status: COMPLETED | OUTPATIENT
Start: 2022-01-01 | End: 2022-01-01

## 2022-01-01 RX ORDER — ACETAMINOPHEN 160 MG/5ML
650 SOLUTION ORAL EVERY 4 HOURS PRN
Status: DISCONTINUED | OUTPATIENT
Start: 2022-01-01 | End: 2022-01-01

## 2022-01-01 RX ORDER — IBUPROFEN 200 MG
1 TABLET ORAL EVERY 12 HOURS SCHEDULED
Qty: 28 G | Refills: 0 | Status: SHIPPED | OUTPATIENT
Start: 2022-01-01

## 2022-01-01 RX ORDER — MELATONIN
1000
Qty: 30 TABLET | Refills: 0 | Status: SHIPPED | OUTPATIENT
Start: 2022-01-01 | End: 2022-01-01

## 2022-01-01 RX ORDER — TRAMADOL HYDROCHLORIDE 50 MG/1
50 TABLET ORAL NIGHTLY
COMMUNITY
End: 2022-01-01 | Stop reason: HOSPADM

## 2022-01-01 RX ORDER — CYCLOBENZAPRINE HCL 10 MG
5 TABLET ORAL ONCE
Status: COMPLETED | OUTPATIENT
Start: 2022-01-01 | End: 2022-01-01

## 2022-01-01 RX ORDER — ASPIRIN 81 MG/1
81 TABLET ORAL DAILY
Refills: 11 | Status: DISCONTINUED | OUTPATIENT
Start: 2022-01-01 | End: 2022-01-01

## 2022-01-01 RX ORDER — VALSARTAN 80 MG/1
80 TABLET ORAL
Status: DISCONTINUED | OUTPATIENT
Start: 2022-01-01 | End: 2022-01-01 | Stop reason: HOSPADM

## 2022-01-01 RX ORDER — ATORVASTATIN CALCIUM 40 MG/1
80 TABLET, FILM COATED ORAL NIGHTLY
Status: DISCONTINUED | OUTPATIENT
Start: 2022-01-01 | End: 2022-01-01 | Stop reason: HOSPADM

## 2022-01-01 RX ADMIN — PYRIDOXINE HCL TAB 50 MG 25 MG: 50 TAB at 16:33

## 2022-01-01 RX ADMIN — Medication 1000 MCG: at 08:46

## 2022-01-01 RX ADMIN — OXYCODONE 5 MG: 5 TABLET ORAL at 16:53

## 2022-01-01 RX ADMIN — HYDROCODONE BITARTRATE AND ACETAMINOPHEN 1 TABLET: 5; 325 TABLET ORAL at 21:18

## 2022-01-01 RX ADMIN — HYDROMORPHONE HYDROCHLORIDE 0.5 MG: 1 INJECTION, SOLUTION INTRAMUSCULAR; INTRAVENOUS; SUBCUTANEOUS at 08:14

## 2022-01-01 RX ADMIN — BACITRACIN ZINC, NEOMYCIN, POLYMYXIN B 1 APPLICATION: 400; 3.5; 5 OINTMENT TOPICAL at 08:28

## 2022-01-01 RX ADMIN — CLOPIDOGREL BISULFATE 75 MG: 75 TABLET ORAL at 15:56

## 2022-01-01 RX ADMIN — OXYCODONE 5 MG: 5 TABLET ORAL at 08:27

## 2022-01-01 RX ADMIN — FAMOTIDINE 20 MG: 20 TABLET, FILM COATED ORAL at 06:34

## 2022-01-01 RX ADMIN — Medication 5000 UNITS: at 08:29

## 2022-01-01 RX ADMIN — Medication 500 MG: at 08:28

## 2022-01-01 RX ADMIN — Medication 25 MG: at 15:01

## 2022-01-01 RX ADMIN — CEFAZOLIN SODIUM 2 G: 2 INJECTION, SOLUTION INTRAVENOUS at 09:38

## 2022-01-01 RX ADMIN — PROPOFOL 110 MG: 10 INJECTION, EMULSION INTRAVENOUS at 17:06

## 2022-01-01 RX ADMIN — Medication 100 MCG: at 18:45

## 2022-01-01 RX ADMIN — Medication 25 MG: at 08:46

## 2022-01-01 RX ADMIN — Medication 400 UNITS: at 22:41

## 2022-01-01 RX ADMIN — Medication 1 TABLET: at 08:47

## 2022-01-01 RX ADMIN — APIXABAN 5 MG: 5 TABLET, FILM COATED ORAL at 08:28

## 2022-01-01 RX ADMIN — CYCLOBENZAPRINE 5 MG: 10 TABLET, FILM COATED ORAL at 04:17

## 2022-01-01 RX ADMIN — OXYCODONE 5 MG: 5 TABLET ORAL at 09:22

## 2022-01-01 RX ADMIN — OXYCODONE 5 MG: 5 TABLET ORAL at 01:33

## 2022-01-01 RX ADMIN — CYCLOBENZAPRINE 5 MG: 10 TABLET, FILM COATED ORAL at 13:10

## 2022-01-01 RX ADMIN — Medication 1000 MCG: at 09:00

## 2022-01-01 RX ADMIN — HYDROCODONE BITARTRATE AND ACETAMINOPHEN 1 TABLET: 5; 325 TABLET ORAL at 04:15

## 2022-01-01 RX ADMIN — ACETAMINOPHEN 1000 MG: 500 TABLET, FILM COATED ORAL at 13:15

## 2022-01-01 RX ADMIN — BISACODYL 10 MG: 5 TABLET, COATED ORAL at 21:00

## 2022-01-01 RX ADMIN — Medication 25 MG: at 08:25

## 2022-01-01 RX ADMIN — METOPROLOL SUCCINATE 50 MG: 50 TABLET, EXTENDED RELEASE ORAL at 21:20

## 2022-01-01 RX ADMIN — BACITRACIN ZINC, NEOMYCIN, POLYMYXIN B 1 APPLICATION: 400; 3.5; 5 OINTMENT TOPICAL at 08:46

## 2022-01-01 RX ADMIN — LORAZEPAM 0.5 MG: 0.5 TABLET ORAL at 03:41

## 2022-01-01 RX ADMIN — Medication 1 TABLET: at 09:33

## 2022-01-01 RX ADMIN — OXYCODONE 5 MG: 5 TABLET ORAL at 17:23

## 2022-01-01 RX ADMIN — OXYCODONE 5 MG: 5 TABLET ORAL at 09:14

## 2022-01-01 RX ADMIN — OXYCODONE 5 MG: 5 TABLET ORAL at 05:15

## 2022-01-01 RX ADMIN — CETIRIZINE HYDROCHLORIDE 10 MG: 10 TABLET, FILM COATED ORAL at 08:19

## 2022-01-01 RX ADMIN — OXYCODONE 5 MG: 5 TABLET ORAL at 08:36

## 2022-01-01 RX ADMIN — LORAZEPAM 0.5 MG: 0.5 TABLET ORAL at 19:32

## 2022-01-01 RX ADMIN — CETIRIZINE HYDROCHLORIDE 10 MG: 10 TABLET, FILM COATED ORAL at 09:19

## 2022-01-01 RX ADMIN — Medication 1 TABLET: at 08:24

## 2022-01-01 RX ADMIN — Medication 1 TABLET: at 08:27

## 2022-01-01 RX ADMIN — Medication 25 MG: at 21:30

## 2022-01-01 RX ADMIN — APIXABAN 5 MG: 5 TABLET, FILM COATED ORAL at 08:02

## 2022-01-01 RX ADMIN — ACETAMINOPHEN 1000 MG: 500 TABLET, FILM COATED ORAL at 05:15

## 2022-01-01 RX ADMIN — METOPROLOL SUCCINATE 125 MG: 25 TABLET, EXTENDED RELEASE ORAL at 09:21

## 2022-01-01 RX ADMIN — OXYCODONE 5 MG: 5 TABLET ORAL at 22:00

## 2022-01-01 RX ADMIN — APIXABAN 5 MG: 5 TABLET, FILM COATED ORAL at 08:46

## 2022-01-01 RX ADMIN — APIXABAN 5 MG: 5 TABLET, FILM COATED ORAL at 09:19

## 2022-01-01 RX ADMIN — POLYETHYLENE GLYCOL (3350) 17 G: 17 POWDER, FOR SOLUTION ORAL at 08:53

## 2022-01-01 RX ADMIN — CYCLOBENZAPRINE 5 MG: 10 TABLET, FILM COATED ORAL at 10:17

## 2022-01-01 RX ADMIN — ESTROGENS, CONJUGATED 0.3 MG: 0.3 TABLET, FILM COATED ORAL at 09:54

## 2022-01-01 RX ADMIN — OXYCODONE 5 MG: 5 TABLET ORAL at 14:35

## 2022-01-01 RX ADMIN — Medication 25 MG: at 16:20

## 2022-01-01 RX ADMIN — HYDROCODONE BITARTRATE AND ACETAMINOPHEN 1 TABLET: 5; 325 TABLET ORAL at 23:16

## 2022-01-01 RX ADMIN — FAMOTIDINE 20 MG: 20 TABLET, FILM COATED ORAL at 08:48

## 2022-01-01 RX ADMIN — CLOPIDOGREL 75 MG: 75 TABLET, FILM COATED ORAL at 08:28

## 2022-01-01 RX ADMIN — Medication 400 UNITS: at 20:38

## 2022-01-01 RX ADMIN — Medication 400 UNITS: at 21:57

## 2022-01-01 RX ADMIN — CEFAZOLIN SODIUM 2 G: 2 INJECTION, SOLUTION INTRAVENOUS at 16:47

## 2022-01-01 RX ADMIN — ENOXAPARIN SODIUM 30 MG: 30 INJECTION SUBCUTANEOUS at 08:24

## 2022-01-01 RX ADMIN — ATORVASTATIN CALCIUM 80 MG: 40 TABLET, FILM COATED ORAL at 08:35

## 2022-01-01 RX ADMIN — OXYCODONE 5 MG: 5 TABLET ORAL at 21:17

## 2022-01-01 RX ADMIN — Medication 5000 UNITS: at 09:21

## 2022-01-01 RX ADMIN — OXYCODONE 5 MG: 5 TABLET ORAL at 13:10

## 2022-01-01 RX ADMIN — VALSARTAN 240 MG: 160 TABLET, FILM COATED ORAL at 09:00

## 2022-01-01 RX ADMIN — OXYCODONE 5 MG: 5 TABLET ORAL at 21:31

## 2022-01-01 RX ADMIN — Medication 500 MG: at 08:46

## 2022-01-01 RX ADMIN — OXYCODONE 5 MG: 5 TABLET ORAL at 17:21

## 2022-01-01 RX ADMIN — ATORVASTATIN CALCIUM 80 MG: 40 TABLET, FILM COATED ORAL at 08:02

## 2022-01-01 RX ADMIN — ACETAMINOPHEN 650 MG: 325 TABLET, FILM COATED ORAL at 07:34

## 2022-01-01 RX ADMIN — FAMOTIDINE 20 MG: 20 TABLET, FILM COATED ORAL at 17:32

## 2022-01-01 RX ADMIN — CLOPIDOGREL 75 MG: 75 TABLET, FILM COATED ORAL at 09:54

## 2022-01-01 RX ADMIN — ASPIRIN 81 MG: 81 TABLET, COATED ORAL at 15:56

## 2022-01-01 RX ADMIN — VALSARTAN 40 MG: 80 TABLET, FILM COATED ORAL at 08:03

## 2022-01-01 RX ADMIN — APIXABAN 5 MG: 5 TABLET, FILM COATED ORAL at 09:33

## 2022-01-01 RX ADMIN — ACETAMINOPHEN 650 MG: 325 TABLET, FILM COATED ORAL at 08:28

## 2022-01-01 RX ADMIN — Medication 25 MG: at 08:48

## 2022-01-01 RX ADMIN — OXYCODONE 5 MG: 5 TABLET ORAL at 21:14

## 2022-01-01 RX ADMIN — BACITRACIN ZINC, NEOMYCIN, POLYMYXIN B 1 APPLICATION: 400; 3.5; 5 OINTMENT TOPICAL at 21:13

## 2022-01-01 RX ADMIN — ONDANSETRON HYDROCHLORIDE 4 MG: 4 TABLET, FILM COATED ORAL at 09:34

## 2022-01-01 RX ADMIN — CYCLOBENZAPRINE 5 MG: 10 TABLET, FILM COATED ORAL at 20:10

## 2022-01-01 RX ADMIN — DIGOXIN 125 MCG: 125 TABLET ORAL at 12:41

## 2022-01-01 RX ADMIN — Medication 25 MG: at 09:15

## 2022-01-01 RX ADMIN — SODIUM CHLORIDE 75 ML/HR: 9 INJECTION, SOLUTION INTRAVENOUS at 23:13

## 2022-01-01 RX ADMIN — Medication 1 MG: at 23:21

## 2022-01-01 RX ADMIN — OXYCODONE 5 MG: 5 TABLET ORAL at 02:45

## 2022-01-01 RX ADMIN — Medication 25 MG: at 09:33

## 2022-01-01 RX ADMIN — Medication 1 TABLET: at 08:50

## 2022-01-01 RX ADMIN — PYRIDOXINE HCL TAB 50 MG 25 MG: 50 TAB at 08:03

## 2022-01-01 RX ADMIN — CYCLOBENZAPRINE 5 MG: 10 TABLET, FILM COATED ORAL at 21:15

## 2022-01-01 RX ADMIN — Medication 400 UNITS: at 21:09

## 2022-01-01 RX ADMIN — BACITRACIN ZINC, NEOMYCIN, POLYMYXIN B 1 APPLICATION: 400; 3.5; 5 OINTMENT TOPICAL at 21:32

## 2022-01-01 RX ADMIN — OXYCODONE 5 MG: 5 TABLET ORAL at 22:38

## 2022-01-01 RX ADMIN — METOPROLOL SUCCINATE 125 MG: 25 TABLET, EXTENDED RELEASE ORAL at 09:03

## 2022-01-01 RX ADMIN — Medication 25 MG: at 16:32

## 2022-01-01 RX ADMIN — OXYCODONE 5 MG: 5 TABLET ORAL at 19:55

## 2022-01-01 RX ADMIN — FAMOTIDINE 20 MG: 20 TABLET, FILM COATED ORAL at 06:32

## 2022-01-01 RX ADMIN — ESTROGENS, CONJUGATED 0.3 MG: 0.3 TABLET, FILM COATED ORAL at 08:18

## 2022-01-01 RX ADMIN — CYCLOBENZAPRINE 5 MG: 10 TABLET, FILM COATED ORAL at 22:26

## 2022-01-01 RX ADMIN — FAMOTIDINE 20 MG: 20 TABLET, FILM COATED ORAL at 06:31

## 2022-01-01 RX ADMIN — CLOPIDOGREL BISULFATE 75 MG: 75 TABLET ORAL at 08:02

## 2022-01-01 RX ADMIN — HYDROCODONE BITARTRATE AND ACETAMINOPHEN 1 TABLET: 5; 325 TABLET ORAL at 21:11

## 2022-01-01 RX ADMIN — POLYETHYLENE GLYCOL (3350) 17 G: 17 POWDER, FOR SOLUTION ORAL at 09:05

## 2022-01-01 RX ADMIN — LORAZEPAM 0.5 MG: 0.5 TABLET ORAL at 11:21

## 2022-01-01 RX ADMIN — ATORVASTATIN CALCIUM 80 MG: 40 TABLET, FILM COATED ORAL at 21:23

## 2022-01-01 RX ADMIN — CYCLOBENZAPRINE 5 MG: 10 TABLET, FILM COATED ORAL at 13:34

## 2022-01-01 RX ADMIN — METOPROLOL SUCCINATE 100 MG: 100 TABLET, FILM COATED, EXTENDED RELEASE ORAL at 20:01

## 2022-01-01 RX ADMIN — CLOPIDOGREL 75 MG: 75 TABLET, FILM COATED ORAL at 09:08

## 2022-01-01 RX ADMIN — ESTROGENS, CONJUGATED 0.3 MG: 0.3 TABLET, FILM COATED ORAL at 08:47

## 2022-01-01 RX ADMIN — OXYCODONE 5 MG: 5 TABLET ORAL at 13:18

## 2022-01-01 RX ADMIN — Medication 1000 MCG: at 08:50

## 2022-01-01 RX ADMIN — APIXABAN 5 MG: 5 TABLET, FILM COATED ORAL at 20:43

## 2022-01-01 RX ADMIN — CLOPIDOGREL 75 MG: 75 TABLET, FILM COATED ORAL at 08:29

## 2022-01-01 RX ADMIN — CYCLOBENZAPRINE 5 MG: 10 TABLET, FILM COATED ORAL at 10:13

## 2022-01-01 RX ADMIN — Medication 10 ML: at 20:01

## 2022-01-01 RX ADMIN — ATORVASTATIN CALCIUM 80 MG: 40 TABLET, FILM COATED ORAL at 21:12

## 2022-01-01 RX ADMIN — HYDROMORPHONE HYDROCHLORIDE 0.5 MG: 1 INJECTION, SOLUTION INTRAMUSCULAR; INTRAVENOUS; SUBCUTANEOUS at 14:50

## 2022-01-01 RX ADMIN — POLYETHYLENE GLYCOL 3350 17 G: 17 POWDER, FOR SOLUTION ORAL at 09:30

## 2022-01-01 RX ADMIN — ROPIVACAINE HYDROCHLORIDE 50 ML: 2 INJECTION, SOLUTION EPIDURAL; INFILTRATION at 16:26

## 2022-01-01 RX ADMIN — Medication 1 MG: at 23:28

## 2022-01-01 RX ADMIN — SODIUM CHLORIDE 100 ML/HR: 9 INJECTION, SOLUTION INTRAVENOUS at 11:49

## 2022-01-01 RX ADMIN — OXYCODONE 5 MG: 5 TABLET ORAL at 19:34

## 2022-01-01 RX ADMIN — FAMOTIDINE 20 MG: 20 TABLET, FILM COATED ORAL at 16:32

## 2022-01-01 RX ADMIN — ACETAMINOPHEN 650 MG: 325 TABLET, FILM COATED ORAL at 16:09

## 2022-01-01 RX ADMIN — HYDROCODONE BITARTRATE AND ACETAMINOPHEN 1 TABLET: 5; 325 TABLET ORAL at 04:09

## 2022-01-01 RX ADMIN — LORAZEPAM 0.5 MG: 0.5 TABLET ORAL at 16:48

## 2022-01-01 RX ADMIN — OXYCODONE 5 MG: 5 TABLET ORAL at 22:50

## 2022-01-01 RX ADMIN — VALSARTAN 320 MG: 160 TABLET, FILM COATED ORAL at 09:55

## 2022-01-01 RX ADMIN — CLOPIDOGREL 75 MG: 75 TABLET, FILM COATED ORAL at 09:00

## 2022-01-01 RX ADMIN — Medication 400 UNITS: at 21:00

## 2022-01-01 RX ADMIN — Medication 400 UNITS: at 08:47

## 2022-01-01 RX ADMIN — FAMOTIDINE 20 MG: 20 TABLET, FILM COATED ORAL at 16:50

## 2022-01-01 RX ADMIN — ENOXAPARIN SODIUM 30 MG: 30 INJECTION SUBCUTANEOUS at 21:12

## 2022-01-01 RX ADMIN — Medication 150 MCG: at 18:34

## 2022-01-01 RX ADMIN — CETIRIZINE HYDROCHLORIDE 10 MG: 10 TABLET, FILM COATED ORAL at 08:46

## 2022-01-01 RX ADMIN — APIXABAN 5 MG: 5 TABLET, FILM COATED ORAL at 09:04

## 2022-01-01 RX ADMIN — MAGNESIUM SULFATE HEPTAHYDRATE 1 G: 1 INJECTION, SOLUTION INTRAVENOUS at 17:47

## 2022-01-01 RX ADMIN — FAMOTIDINE 20 MG: 20 TABLET ORAL at 08:37

## 2022-01-01 RX ADMIN — APIXABAN 5 MG: 5 TABLET, FILM COATED ORAL at 20:48

## 2022-01-01 RX ADMIN — CYCLOBENZAPRINE 5 MG: 10 TABLET, FILM COATED ORAL at 06:07

## 2022-01-01 RX ADMIN — Medication 500 MG: at 09:18

## 2022-01-01 RX ADMIN — BACITRACIN ZINC, NEOMYCIN, POLYMYXIN B 1 APPLICATION: 400; 3.5; 5 OINTMENT TOPICAL at 09:21

## 2022-01-01 RX ADMIN — OXYCODONE 5 MG: 5 TABLET ORAL at 05:38

## 2022-01-01 RX ADMIN — FAMOTIDINE 20 MG: 20 TABLET, FILM COATED ORAL at 08:53

## 2022-01-01 RX ADMIN — ATORVASTATIN CALCIUM 80 MG: 40 TABLET, FILM COATED ORAL at 21:15

## 2022-01-01 RX ADMIN — Medication 25 MG: at 08:19

## 2022-01-01 RX ADMIN — ESTROGENS, CONJUGATED 0.3 MG: 0.3 TABLET, FILM COATED ORAL at 09:15

## 2022-01-01 RX ADMIN — CYCLOBENZAPRINE 5 MG: 10 TABLET, FILM COATED ORAL at 21:44

## 2022-01-01 RX ADMIN — ACETAMINOPHEN 650 MG: 325 TABLET, FILM COATED ORAL at 20:09

## 2022-01-01 RX ADMIN — ESTROGENS, CONJUGATED 0.3 MG: 0.3 TABLET, FILM COATED ORAL at 09:04

## 2022-01-01 RX ADMIN — CETIRIZINE HYDROCHLORIDE 10 MG: 10 TABLET, FILM COATED ORAL at 09:54

## 2022-01-01 RX ADMIN — Medication 25 MG: at 20:43

## 2022-01-01 RX ADMIN — FAMOTIDINE 20 MG: 20 TABLET, FILM COATED ORAL at 06:40

## 2022-01-01 RX ADMIN — CYCLOBENZAPRINE 5 MG: 10 TABLET, FILM COATED ORAL at 17:22

## 2022-01-01 RX ADMIN — ESTROGENS, CONJUGATED 0.3 MG: 0.3 TABLET, FILM COATED ORAL at 08:29

## 2022-01-01 RX ADMIN — METOPROLOL SUCCINATE 125 MG: 25 TABLET, EXTENDED RELEASE ORAL at 08:19

## 2022-01-01 RX ADMIN — FAMOTIDINE 20 MG: 20 TABLET, FILM COATED ORAL at 16:34

## 2022-01-01 RX ADMIN — ASPIRIN 81 MG: 81 TABLET, COATED ORAL at 08:28

## 2022-01-01 RX ADMIN — CEFAZOLIN SODIUM 2 G: 2 INJECTION, SOLUTION INTRAVENOUS at 17:13

## 2022-01-01 RX ADMIN — OXYCODONE 5 MG: 5 TABLET ORAL at 08:54

## 2022-01-01 RX ADMIN — Medication 25 MG: at 17:02

## 2022-01-01 RX ADMIN — OXYCODONE 5 MG: 5 TABLET ORAL at 14:50

## 2022-01-01 RX ADMIN — Medication 25 MG: at 21:11

## 2022-01-01 RX ADMIN — Medication 10 ML: at 08:40

## 2022-01-01 RX ADMIN — APIXABAN 5 MG: 5 TABLET, FILM COATED ORAL at 21:09

## 2022-01-01 RX ADMIN — FAMOTIDINE 20 MG: 20 TABLET ORAL at 06:32

## 2022-01-01 RX ADMIN — SENNOSIDES AND DOCUSATE SODIUM 2 TABLET: 50; 8.6 TABLET ORAL at 22:19

## 2022-01-01 RX ADMIN — Medication 1000 MCG: at 08:36

## 2022-01-01 RX ADMIN — Medication 25 MG: at 21:16

## 2022-01-01 RX ADMIN — FAMOTIDINE 20 MG: 20 TABLET, FILM COATED ORAL at 06:38

## 2022-01-01 RX ADMIN — OXYCODONE 5 MG: 5 TABLET ORAL at 16:52

## 2022-01-01 RX ADMIN — Medication 400 UNITS: at 09:54

## 2022-01-01 RX ADMIN — METOPROLOL SUCCINATE 50 MG: 50 TABLET, EXTENDED RELEASE ORAL at 23:14

## 2022-01-01 RX ADMIN — OXYCODONE 5 MG: 5 TABLET ORAL at 21:57

## 2022-01-01 RX ADMIN — CETIRIZINE HYDROCHLORIDE 10 MG: 10 TABLET, FILM COATED ORAL at 09:00

## 2022-01-01 RX ADMIN — METOPROLOL SUCCINATE 100 MG: 50 TABLET, FILM COATED, EXTENDED RELEASE ORAL at 09:00

## 2022-01-01 RX ADMIN — OXYCODONE 5 MG: 5 TABLET ORAL at 13:47

## 2022-01-01 RX ADMIN — ATORVASTATIN CALCIUM 80 MG: 40 TABLET, FILM COATED ORAL at 09:22

## 2022-01-01 RX ADMIN — POLYETHYLENE GLYCOL 3350 17 G: 17 POWDER, FOR SOLUTION ORAL at 12:41

## 2022-01-01 RX ADMIN — APIXABAN 5 MG: 5 TABLET, FILM COATED ORAL at 08:44

## 2022-01-01 RX ADMIN — FAMOTIDINE 20 MG: 20 TABLET, FILM COATED ORAL at 09:04

## 2022-01-01 RX ADMIN — BACITRACIN ZINC, NEOMYCIN, POLYMYXIN B 1 APPLICATION: 400; 3.5; 5 OINTMENT TOPICAL at 08:47

## 2022-01-01 RX ADMIN — HYDROCODONE BITARTRATE AND ACETAMINOPHEN 1 TABLET: 5; 325 TABLET ORAL at 08:39

## 2022-01-01 RX ADMIN — OXYCODONE 5 MG: 5 TABLET ORAL at 08:47

## 2022-01-01 RX ADMIN — OXYCODONE 5 MG: 5 TABLET ORAL at 06:14

## 2022-01-01 RX ADMIN — ONDANSETRON HYDROCHLORIDE 4 MG: 4 TABLET, FILM COATED ORAL at 11:37

## 2022-01-01 RX ADMIN — APIXABAN 5 MG: 5 TABLET, FILM COATED ORAL at 21:57

## 2022-01-01 RX ADMIN — METOPROLOL SUCCINATE 100 MG: 50 TABLET, FILM COATED, EXTENDED RELEASE ORAL at 08:27

## 2022-01-01 RX ADMIN — Medication 400 UNITS: at 09:07

## 2022-01-01 RX ADMIN — VALSARTAN 320 MG: 160 TABLET, FILM COATED ORAL at 08:29

## 2022-01-01 RX ADMIN — OXYCODONE 5 MG: 5 TABLET ORAL at 20:59

## 2022-01-01 RX ADMIN — CYCLOBENZAPRINE 5 MG: 10 TABLET, FILM COATED ORAL at 00:52

## 2022-01-01 RX ADMIN — VALSARTAN 320 MG: 160 TABLET, FILM COATED ORAL at 08:44

## 2022-01-01 RX ADMIN — LORAZEPAM 0.5 MG: 0.5 TABLET ORAL at 05:15

## 2022-01-01 RX ADMIN — METOPROLOL SUCCINATE 125 MG: 25 TABLET, EXTENDED RELEASE ORAL at 09:33

## 2022-01-01 RX ADMIN — Medication 10 ML: at 08:28

## 2022-01-01 RX ADMIN — OXYCODONE 5 MG: 5 TABLET ORAL at 13:52

## 2022-01-01 RX ADMIN — Medication 5000 UNITS: at 08:36

## 2022-01-01 RX ADMIN — FAMOTIDINE 20 MG: 20 TABLET, FILM COATED ORAL at 16:52

## 2022-01-01 RX ADMIN — Medication 25 MG: at 20:48

## 2022-01-01 RX ADMIN — OXYCODONE 5 MG: 5 TABLET ORAL at 05:24

## 2022-01-01 RX ADMIN — DEXAMETHASONE SODIUM PHOSPHATE 2 MG: 10 INJECTION, SOLUTION INTRAMUSCULAR; INTRAVENOUS at 16:26

## 2022-01-01 RX ADMIN — ATORVASTATIN CALCIUM 80 MG: 40 TABLET, FILM COATED ORAL at 21:31

## 2022-01-01 RX ADMIN — OXYCODONE 5 MG: 5 TABLET ORAL at 21:11

## 2022-01-01 RX ADMIN — CLOPIDOGREL 75 MG: 75 TABLET, FILM COATED ORAL at 09:19

## 2022-01-01 RX ADMIN — OXYCODONE 5 MG: 5 TABLET ORAL at 02:36

## 2022-01-01 RX ADMIN — AMLODIPINE BESYLATE 2.5 MG: 2.5 TABLET ORAL at 08:37

## 2022-01-01 RX ADMIN — SENNOSIDES AND DOCUSATE SODIUM 2 TABLET: 50; 8.6 TABLET ORAL at 08:03

## 2022-01-01 RX ADMIN — Medication 1 TABLET: at 08:46

## 2022-01-01 RX ADMIN — ATORVASTATIN CALCIUM 80 MG: 40 TABLET, FILM COATED ORAL at 08:24

## 2022-01-01 RX ADMIN — Medication 1000 MCG: at 09:22

## 2022-01-01 RX ADMIN — OXYCODONE 5 MG: 5 TABLET ORAL at 13:32

## 2022-01-01 RX ADMIN — OXYCODONE 5 MG: 5 TABLET ORAL at 05:22

## 2022-01-01 RX ADMIN — HYDROMORPHONE HYDROCHLORIDE 0.5 MG: 1 INJECTION, SOLUTION INTRAMUSCULAR; INTRAVENOUS; SUBCUTANEOUS at 02:38

## 2022-01-01 RX ADMIN — Medication 1 MG: at 23:23

## 2022-01-01 RX ADMIN — OXYCODONE 5 MG: 5 TABLET ORAL at 20:01

## 2022-01-01 RX ADMIN — ESTROGENS, CONJUGATED 0.3 MG: 0.3 TABLET, FILM COATED ORAL at 09:00

## 2022-01-01 RX ADMIN — Medication 25 MG: at 09:00

## 2022-01-01 RX ADMIN — VALSARTAN 80 MG: 80 TABLET, FILM COATED ORAL at 08:47

## 2022-01-01 RX ADMIN — FAMOTIDINE 20 MG: 20 TABLET, FILM COATED ORAL at 08:17

## 2022-01-01 RX ADMIN — FAMOTIDINE 20 MG: 20 TABLET, FILM COATED ORAL at 17:05

## 2022-01-01 RX ADMIN — Medication 400 UNITS: at 09:05

## 2022-01-01 RX ADMIN — ACETAMINOPHEN 1000 MG: 500 TABLET, FILM COATED ORAL at 05:44

## 2022-01-01 RX ADMIN — Medication 25 MG: at 08:36

## 2022-01-01 RX ADMIN — Medication 1000 MCG: at 08:26

## 2022-01-01 RX ADMIN — Medication 500 MG: at 08:36

## 2022-01-01 RX ADMIN — ACETAMINOPHEN 1000 MG: 500 TABLET, FILM COATED ORAL at 19:54

## 2022-01-01 RX ADMIN — Medication 1000 MCG: at 09:08

## 2022-01-01 RX ADMIN — APIXABAN 5 MG: 5 TABLET, FILM COATED ORAL at 08:19

## 2022-01-01 RX ADMIN — POLYETHYLENE GLYCOL (3350) 17 G: 17 POWDER, FOR SOLUTION ORAL at 09:22

## 2022-01-01 RX ADMIN — POLYETHYLENE GLYCOL (3350) 17 G: 17 POWDER, FOR SOLUTION ORAL at 08:45

## 2022-01-01 RX ADMIN — OXYCODONE 5 MG: 5 TABLET ORAL at 12:27

## 2022-01-01 RX ADMIN — Medication 5000 UNITS: at 09:54

## 2022-01-01 RX ADMIN — OXYCODONE 5 MG: 5 TABLET ORAL at 17:04

## 2022-01-01 RX ADMIN — Medication 200 MCG: at 17:06

## 2022-01-01 RX ADMIN — Medication 5000 UNITS: at 08:18

## 2022-01-01 RX ADMIN — ACETAMINOPHEN 650 MG: 325 TABLET, FILM COATED ORAL at 23:35

## 2022-01-01 RX ADMIN — Medication 1 TABLET: at 09:19

## 2022-01-01 RX ADMIN — SENNOSIDES AND DOCUSATE SODIUM 2 TABLET: 50; 8.6 TABLET ORAL at 12:02

## 2022-01-01 RX ADMIN — Medication 1 TABLET: at 09:22

## 2022-01-01 RX ADMIN — POLYETHYLENE GLYCOL (3350) 17 G: 17 POWDER, FOR SOLUTION ORAL at 09:06

## 2022-01-01 RX ADMIN — Medication 500 MG: at 08:19

## 2022-01-01 RX ADMIN — METOPROLOL SUCCINATE 50 MG: 50 TABLET, EXTENDED RELEASE ORAL at 19:55

## 2022-01-01 RX ADMIN — OXYCODONE 5 MG: 5 TABLET ORAL at 01:30

## 2022-01-01 RX ADMIN — OXYCODONE 5 MG: 5 TABLET ORAL at 08:35

## 2022-01-01 RX ADMIN — Medication 5000 UNITS: at 08:48

## 2022-01-01 RX ADMIN — VALSARTAN 320 MG: 160 TABLET, FILM COATED ORAL at 08:26

## 2022-01-01 RX ADMIN — CETIRIZINE HYDROCHLORIDE 10 MG: 10 TABLET, FILM COATED ORAL at 09:07

## 2022-01-01 RX ADMIN — OXYCODONE 5 MG: 5 TABLET ORAL at 17:46

## 2022-01-01 RX ADMIN — VALSARTAN 80 MG: 80 TABLET, FILM COATED ORAL at 08:36

## 2022-01-01 RX ADMIN — LORAZEPAM 0.5 MG: 0.5 TABLET ORAL at 22:26

## 2022-01-01 RX ADMIN — METOPROLOL SUCCINATE 100 MG: 50 TABLET, EXTENDED RELEASE ORAL at 08:29

## 2022-01-01 RX ADMIN — ATORVASTATIN CALCIUM 80 MG: 40 TABLET, FILM COATED ORAL at 21:09

## 2022-01-01 RX ADMIN — CETIRIZINE HYDROCHLORIDE 10 MG: 10 TABLET, FILM COATED ORAL at 09:04

## 2022-01-01 RX ADMIN — OXYCODONE 5 MG: 5 TABLET ORAL at 01:24

## 2022-01-01 RX ADMIN — FAMOTIDINE 20 MG: 20 TABLET ORAL at 08:28

## 2022-01-01 RX ADMIN — ATORVASTATIN CALCIUM 80 MG: 40 TABLET, FILM COATED ORAL at 20:49

## 2022-01-01 RX ADMIN — BACITRACIN ZINC, NEOMYCIN, POLYMYXIN B 1 APPLICATION: 400; 3.5; 5 OINTMENT TOPICAL at 13:08

## 2022-01-01 RX ADMIN — Medication 10 ML: at 21:12

## 2022-01-01 RX ADMIN — ENOXAPARIN SODIUM 30 MG: 30 INJECTION SUBCUTANEOUS at 08:34

## 2022-01-01 RX ADMIN — MIDODRINE HYDROCHLORIDE 2.5 MG: 5 TABLET ORAL at 15:47

## 2022-01-01 RX ADMIN — CETIRIZINE HYDROCHLORIDE 10 MG: 10 TABLET, FILM COATED ORAL at 09:33

## 2022-01-01 RX ADMIN — LIDOCAINE HYDROCHLORIDE 50 MG: 10 INJECTION, SOLUTION EPIDURAL; INFILTRATION; INTRACAUDAL; PERINEURAL at 17:06

## 2022-01-01 RX ADMIN — CETIRIZINE HYDROCHLORIDE 10 MG: 10 TABLET, FILM COATED ORAL at 08:36

## 2022-01-01 RX ADMIN — ACETAMINOPHEN 650 MG: 325 TABLET, FILM COATED ORAL at 20:38

## 2022-01-01 RX ADMIN — LABETALOL HYDROCHLORIDE 20 MG: 5 INJECTION, SOLUTION INTRAVENOUS at 12:46

## 2022-01-01 RX ADMIN — Medication 1 TABLET: at 08:03

## 2022-01-01 RX ADMIN — Medication 25 MG: at 09:20

## 2022-01-01 RX ADMIN — CLOPIDOGREL 75 MG: 75 TABLET, FILM COATED ORAL at 09:03

## 2022-01-01 RX ADMIN — BACITRACIN ZINC, NEOMYCIN, POLYMYXIN B 1 APPLICATION: 400; 3.5; 5 OINTMENT TOPICAL at 21:01

## 2022-01-01 RX ADMIN — CYCLOBENZAPRINE 5 MG: 10 TABLET, FILM COATED ORAL at 21:58

## 2022-01-01 RX ADMIN — CETIRIZINE HYDROCHLORIDE 10 MG: 10 TABLET, FILM COATED ORAL at 08:28

## 2022-01-01 RX ADMIN — CYCLOBENZAPRINE 5 MG: 10 TABLET, FILM COATED ORAL at 05:51

## 2022-01-01 RX ADMIN — Medication 100 MCG: at 17:19

## 2022-01-01 RX ADMIN — OXYCODONE 5 MG: 5 TABLET ORAL at 04:46

## 2022-01-01 RX ADMIN — Medication 150 MCG: at 18:26

## 2022-01-01 RX ADMIN — SENNOSIDES AND DOCUSATE SODIUM 2 TABLET: 50; 8.6 TABLET ORAL at 08:28

## 2022-01-01 RX ADMIN — Medication 400 UNITS: at 09:33

## 2022-01-01 RX ADMIN — ENOXAPARIN SODIUM 30 MG: 30 INJECTION SUBCUTANEOUS at 09:21

## 2022-01-01 RX ADMIN — OXYCODONE 5 MG: 5 TABLET ORAL at 18:07

## 2022-01-01 RX ADMIN — CYCLOBENZAPRINE 5 MG: 10 TABLET, FILM COATED ORAL at 18:07

## 2022-01-01 RX ADMIN — SENNOSIDES AND DOCUSATE SODIUM 2 TABLET: 50; 8.6 TABLET ORAL at 08:02

## 2022-01-01 RX ADMIN — OXYCODONE 5 MG: 5 TABLET ORAL at 04:17

## 2022-01-01 RX ADMIN — Medication 25 MG: at 08:29

## 2022-01-01 RX ADMIN — POLYETHYLENE GLYCOL (3350) 17 G: 17 POWDER, FOR SOLUTION ORAL at 09:54

## 2022-01-01 RX ADMIN — OXYCODONE 5 MG: 5 TABLET ORAL at 01:49

## 2022-01-01 RX ADMIN — HYDROMORPHONE HYDROCHLORIDE 1 MG: 1 INJECTION, SOLUTION INTRAMUSCULAR; INTRAVENOUS; SUBCUTANEOUS at 22:24

## 2022-01-01 RX ADMIN — OXYCODONE 5 MG: 5 TABLET ORAL at 05:21

## 2022-01-01 RX ADMIN — OXYCODONE 5 MG: 5 TABLET ORAL at 14:14

## 2022-01-01 RX ADMIN — OXYCODONE 5 MG: 5 TABLET ORAL at 08:45

## 2022-01-01 RX ADMIN — Medication 100 MCG: at 17:34

## 2022-01-01 RX ADMIN — Medication 1 TABLET: at 08:36

## 2022-01-01 RX ADMIN — OXYCODONE 5 MG: 5 TABLET ORAL at 13:14

## 2022-01-01 RX ADMIN — BACITRACIN ZINC, NEOMYCIN, POLYMYXIN B 1 APPLICATION: 400; 3.5; 5 OINTMENT TOPICAL at 09:55

## 2022-01-01 RX ADMIN — ACETAMINOPHEN 650 MG: 325 TABLET, FILM COATED ORAL at 02:09

## 2022-01-01 RX ADMIN — Medication 1 TABLET: at 08:29

## 2022-01-01 RX ADMIN — Medication 1 TABLET: at 09:00

## 2022-01-01 RX ADMIN — CLOPIDOGREL 75 MG: 75 TABLET, FILM COATED ORAL at 09:21

## 2022-01-01 RX ADMIN — Medication 25 MG: at 08:44

## 2022-01-01 RX ADMIN — BISACODYL 5 MG: 5 TABLET, COATED ORAL at 09:22

## 2022-01-01 RX ADMIN — FAMOTIDINE 20 MG: 20 TABLET ORAL at 06:52

## 2022-01-01 RX ADMIN — CYCLOBENZAPRINE 5 MG: 10 TABLET, FILM COATED ORAL at 01:49

## 2022-01-01 RX ADMIN — OXYCODONE 5 MG: 5 TABLET ORAL at 06:07

## 2022-01-01 RX ADMIN — METOPROLOL SUCCINATE 100 MG: 100 TABLET, FILM COATED, EXTENDED RELEASE ORAL at 08:24

## 2022-01-01 RX ADMIN — LABETALOL HYDROCHLORIDE 20 MG: 5 INJECTION, SOLUTION INTRAVENOUS at 11:42

## 2022-01-01 RX ADMIN — HYDROCODONE BITARTRATE AND ACETAMINOPHEN 1 TABLET: 5; 325 TABLET ORAL at 21:31

## 2022-01-01 RX ADMIN — ACETAMINOPHEN 650 MG: 325 TABLET, FILM COATED ORAL at 18:43

## 2022-01-01 RX ADMIN — OXYCODONE 5 MG: 5 TABLET ORAL at 21:23

## 2022-01-01 RX ADMIN — OXYCODONE 5 MG: 5 TABLET ORAL at 20:38

## 2022-01-01 RX ADMIN — APIXABAN 5 MG: 5 TABLET, FILM COATED ORAL at 08:48

## 2022-01-01 RX ADMIN — OXYCODONE 5 MG: 5 TABLET ORAL at 17:38

## 2022-01-01 RX ADMIN — Medication 25 MG: at 21:09

## 2022-01-01 RX ADMIN — OXYCODONE 5 MG: 5 TABLET ORAL at 14:05

## 2022-01-01 RX ADMIN — FAMOTIDINE 20 MG: 20 TABLET, FILM COATED ORAL at 08:35

## 2022-01-01 RX ADMIN — Medication 25 MG: at 21:00

## 2022-01-01 RX ADMIN — Medication 400 UNITS: at 21:14

## 2022-01-01 RX ADMIN — ONDANSETRON 4 MG: 2 INJECTION INTRAMUSCULAR; INTRAVENOUS at 18:22

## 2022-01-01 RX ADMIN — APIXABAN 5 MG: 5 TABLET, FILM COATED ORAL at 21:30

## 2022-01-01 RX ADMIN — ASPIRIN 81 MG: 81 TABLET, COATED ORAL at 08:39

## 2022-01-01 RX ADMIN — OXYCODONE 5 MG: 5 TABLET ORAL at 08:23

## 2022-01-01 RX ADMIN — Medication 25 MG: at 21:57

## 2022-01-01 RX ADMIN — FAMOTIDINE 20 MG: 20 TABLET ORAL at 08:40

## 2022-01-01 RX ADMIN — APIXABAN 5 MG: 5 TABLET, FILM COATED ORAL at 21:23

## 2022-01-01 RX ADMIN — ACETAMINOPHEN 650 MG: 325 TABLET, FILM COATED ORAL at 19:47

## 2022-01-01 RX ADMIN — ACETAMINOPHEN 1000 MG: 500 TABLET, FILM COATED ORAL at 13:52

## 2022-01-01 RX ADMIN — VALSARTAN 80 MG: 80 TABLET, FILM COATED ORAL at 09:04

## 2022-01-01 RX ADMIN — CYCLOBENZAPRINE 5 MG: 10 TABLET, FILM COATED ORAL at 08:17

## 2022-01-01 RX ADMIN — Medication 500 MG: at 08:47

## 2022-01-01 RX ADMIN — Medication 1 TABLET: at 08:35

## 2022-01-01 RX ADMIN — HYDROCODONE BITARTRATE AND ACETAMINOPHEN 1 TABLET: 5; 325 TABLET ORAL at 12:41

## 2022-01-01 RX ADMIN — VALSARTAN 80 MG: 80 TABLET, FILM COATED ORAL at 09:21

## 2022-01-01 RX ADMIN — Medication 1 SPRAY: at 16:28

## 2022-01-01 RX ADMIN — CYCLOBENZAPRINE 5 MG: 10 TABLET, FILM COATED ORAL at 05:22

## 2022-01-01 RX ADMIN — ATORVASTATIN CALCIUM 80 MG: 40 TABLET, FILM COATED ORAL at 20:44

## 2022-01-01 RX ADMIN — OXYCODONE 5 MG: 5 TABLET ORAL at 13:43

## 2022-01-01 RX ADMIN — APIXABAN 5 MG: 5 TABLET, FILM COATED ORAL at 20:09

## 2022-01-01 RX ADMIN — CLOPIDOGREL BISULFATE 75 MG: 75 TABLET ORAL at 08:35

## 2022-01-01 RX ADMIN — APIXABAN 5 MG: 5 TABLET, FILM COATED ORAL at 22:40

## 2022-01-01 RX ADMIN — OXYCODONE 5 MG: 5 TABLET ORAL at 19:52

## 2022-01-01 RX ADMIN — VALSARTAN 320 MG: 160 TABLET, FILM COATED ORAL at 08:01

## 2022-01-01 RX ADMIN — Medication 1000 MCG: at 09:33

## 2022-01-01 RX ADMIN — BACITRACIN ZINC, NEOMYCIN, POLYMYXIN B 1 APPLICATION: 400; 3.5; 5 OINTMENT TOPICAL at 20:44

## 2022-01-01 RX ADMIN — CYCLOBENZAPRINE 5 MG: 10 TABLET, FILM COATED ORAL at 09:45

## 2022-01-01 RX ADMIN — VALSARTAN 40 MG: 80 TABLET, FILM COATED ORAL at 13:15

## 2022-01-01 RX ADMIN — CYCLOBENZAPRINE 5 MG: 10 TABLET, FILM COATED ORAL at 16:48

## 2022-01-01 RX ADMIN — Medication 1000 MCG: at 08:45

## 2022-01-01 RX ADMIN — FAMOTIDINE 20 MG: 20 TABLET, FILM COATED ORAL at 17:00

## 2022-01-01 RX ADMIN — CYCLOBENZAPRINE 5 MG: 10 TABLET, FILM COATED ORAL at 16:52

## 2022-01-01 RX ADMIN — BACITRACIN ZINC, NEOMYCIN, POLYMYXIN B 1 APPLICATION: 400; 3.5; 5 OINTMENT TOPICAL at 09:36

## 2022-01-01 RX ADMIN — Medication 25 MG: at 16:09

## 2022-01-01 RX ADMIN — CYCLOBENZAPRINE 5 MG: 10 TABLET, FILM COATED ORAL at 21:11

## 2022-01-01 RX ADMIN — CLOPIDOGREL 75 MG: 75 TABLET, FILM COATED ORAL at 08:19

## 2022-01-01 RX ADMIN — Medication 500 MG: at 09:00

## 2022-01-01 RX ADMIN — ASPIRIN 81 MG: 81 TABLET, COATED ORAL at 08:35

## 2022-01-01 RX ADMIN — Medication 400 UNITS: at 21:23

## 2022-01-01 RX ADMIN — PYRIDOXINE HCL TAB 50 MG 25 MG: 50 TAB at 16:28

## 2022-01-01 RX ADMIN — OXYCODONE 5 MG: 5 TABLET ORAL at 05:44

## 2022-01-01 RX ADMIN — CYCLOBENZAPRINE 5 MG: 10 TABLET, FILM COATED ORAL at 21:23

## 2022-01-01 RX ADMIN — Medication 10 ML: at 08:38

## 2022-01-01 RX ADMIN — SENNOSIDES AND DOCUSATE SODIUM 2 TABLET: 50; 8.6 TABLET ORAL at 08:39

## 2022-01-01 RX ADMIN — Medication 25 MG: at 16:33

## 2022-01-01 RX ADMIN — Medication 100 MCG: at 17:55

## 2022-01-01 RX ADMIN — Medication 1000 MCG: at 09:54

## 2022-01-01 RX ADMIN — SENNOSIDES AND DOCUSATE SODIUM 2 TABLET: 50; 8.6 TABLET ORAL at 19:57

## 2022-01-01 RX ADMIN — METOPROLOL SUCCINATE 100 MG: 100 TABLET, FILM COATED, EXTENDED RELEASE ORAL at 20:46

## 2022-01-01 RX ADMIN — APIXABAN 5 MG: 5 TABLET, FILM COATED ORAL at 21:16

## 2022-01-01 RX ADMIN — CYCLOBENZAPRINE 5 MG: 10 TABLET, FILM COATED ORAL at 09:53

## 2022-01-01 RX ADMIN — BACITRACIN ZINC, NEOMYCIN, POLYMYXIN B 1 APPLICATION: 400; 3.5; 5 OINTMENT TOPICAL at 09:05

## 2022-01-01 RX ADMIN — BACITRACIN ZINC, NEOMYCIN, POLYMYXIN B 1 APPLICATION: 400; 3.5; 5 OINTMENT TOPICAL at 20:11

## 2022-01-01 RX ADMIN — ACETAMINOPHEN 1000 MG: 500 TABLET, FILM COATED ORAL at 13:18

## 2022-01-01 RX ADMIN — SODIUM CHLORIDE 75 ML/HR: 9 INJECTION, SOLUTION INTRAVENOUS at 20:25

## 2022-01-01 RX ADMIN — CLOPIDOGREL 75 MG: 75 TABLET, FILM COATED ORAL at 08:46

## 2022-01-01 RX ADMIN — Medication 500 MG: at 09:33

## 2022-01-01 RX ADMIN — FENTANYL CITRATE 50 MCG: 50 INJECTION, SOLUTION INTRAMUSCULAR; INTRAVENOUS at 19:35

## 2022-01-01 RX ADMIN — Medication 10 ML: at 21:18

## 2022-01-01 RX ADMIN — ATORVASTATIN CALCIUM 80 MG: 40 TABLET, FILM COATED ORAL at 08:00

## 2022-01-01 RX ADMIN — Medication 400 UNITS: at 08:30

## 2022-01-01 RX ADMIN — TRANEXAMIC ACID 1000 MG: 10 INJECTION, SOLUTION INTRAVENOUS at 17:23

## 2022-01-01 RX ADMIN — CYCLOBENZAPRINE 5 MG: 10 TABLET, FILM COATED ORAL at 19:38

## 2022-01-01 RX ADMIN — ESTROGENS, CONJUGATED 0.3 MG: 0.3 TABLET, FILM COATED ORAL at 08:48

## 2022-01-01 RX ADMIN — HYDROMORPHONE HYDROCHLORIDE 0.5 MG: 1 INJECTION, SOLUTION INTRAMUSCULAR; INTRAVENOUS; SUBCUTANEOUS at 10:51

## 2022-01-01 RX ADMIN — ACETAMINOPHEN 1000 MG: 500 TABLET, FILM COATED ORAL at 20:46

## 2022-01-01 RX ADMIN — LORAZEPAM 0.5 MG: 0.5 TABLET ORAL at 09:48

## 2022-01-01 RX ADMIN — AMLODIPINE BESYLATE 5 MG: 5 TABLET ORAL at 08:01

## 2022-01-01 RX ADMIN — Medication 400 UNITS: at 21:17

## 2022-01-01 RX ADMIN — APIXABAN 5 MG: 5 TABLET, FILM COATED ORAL at 20:01

## 2022-01-01 RX ADMIN — ROPIVACAINE HYDROCHLORIDE 50 ML: 2 INJECTION, SOLUTION EPIDURAL; INFILTRATION; PERINEURAL at 08:48

## 2022-01-01 RX ADMIN — Medication 25 MG: at 09:54

## 2022-01-01 RX ADMIN — Medication 400 UNITS: at 20:49

## 2022-01-01 RX ADMIN — BACITRACIN ZINC, NEOMYCIN, POLYMYXIN B 1 APPLICATION: 400; 3.5; 5 OINTMENT TOPICAL at 20:49

## 2022-01-01 RX ADMIN — Medication 25 MG: at 16:52

## 2022-01-01 RX ADMIN — FAMOTIDINE 20 MG: 20 TABLET, FILM COATED ORAL at 17:34

## 2022-01-01 RX ADMIN — FAMOTIDINE 20 MG: 20 TABLET, FILM COATED ORAL at 17:38

## 2022-01-01 RX ADMIN — SENNOSIDES AND DOCUSATE SODIUM 2 TABLET: 50; 8.6 TABLET ORAL at 23:16

## 2022-01-01 RX ADMIN — Medication 25 MG: at 17:34

## 2022-01-01 RX ADMIN — Medication 1 TABLET: at 09:21

## 2022-01-01 RX ADMIN — ATORVASTATIN CALCIUM 80 MG: 40 TABLET, FILM COATED ORAL at 22:37

## 2022-01-01 RX ADMIN — CETIRIZINE HYDROCHLORIDE 10 MG: 10 TABLET, FILM COATED ORAL at 08:47

## 2022-01-01 RX ADMIN — Medication 200 MCG: at 18:09

## 2022-01-01 RX ADMIN — Medication 400 UNITS: at 09:22

## 2022-01-01 RX ADMIN — Medication 1000 MCG: at 09:03

## 2022-01-01 RX ADMIN — Medication 25 MG: at 21:15

## 2022-01-01 RX ADMIN — Medication 400 UNITS: at 09:14

## 2022-01-01 RX ADMIN — BACITRACIN ZINC, NEOMYCIN, POLYMYXIN B 1 APPLICATION: 400; 3.5; 5 OINTMENT TOPICAL at 09:01

## 2022-01-01 RX ADMIN — ESTROGENS, CONJUGATED 0.3 MG: 0.3 TABLET, FILM COATED ORAL at 08:45

## 2022-01-01 RX ADMIN — CLOPIDOGREL BISULFATE 75 MG: 75 TABLET ORAL at 08:40

## 2022-01-01 RX ADMIN — ATORVASTATIN CALCIUM 80 MG: 40 TABLET, FILM COATED ORAL at 20:37

## 2022-01-01 RX ADMIN — DEXAMETHASONE SODIUM PHOSPHATE 8 MG: 4 INJECTION, SOLUTION INTRAMUSCULAR; INTRAVENOUS at 17:06

## 2022-01-01 RX ADMIN — CYCLOBENZAPRINE 5 MG: 10 TABLET, FILM COATED ORAL at 08:28

## 2022-01-01 RX ADMIN — OXYCODONE 5 MG: 5 TABLET ORAL at 20:46

## 2022-01-01 RX ADMIN — BACITRACIN ZINC, NEOMYCIN, POLYMYXIN B 1 APPLICATION: 400; 3.5; 5 OINTMENT TOPICAL at 21:58

## 2022-01-01 RX ADMIN — Medication 25 MG: at 21:23

## 2022-01-01 RX ADMIN — SENNOSIDES AND DOCUSATE SODIUM 2 TABLET: 50; 8.6 TABLET ORAL at 20:46

## 2022-01-01 RX ADMIN — OXYCODONE 5 MG: 5 TABLET ORAL at 08:59

## 2022-01-01 RX ADMIN — OXYCODONE 5 MG: 5 TABLET ORAL at 02:00

## 2022-01-01 RX ADMIN — Medication 400 UNITS: at 08:28

## 2022-01-01 RX ADMIN — FAMOTIDINE 20 MG: 20 TABLET, FILM COATED ORAL at 09:14

## 2022-01-01 RX ADMIN — SENNOSIDES AND DOCUSATE SODIUM 2 TABLET: 50; 8.6 TABLET ORAL at 08:34

## 2022-01-01 RX ADMIN — OXYCODONE 5 MG: 5 TABLET ORAL at 10:20

## 2022-01-01 RX ADMIN — APIXABAN 5 MG: 5 TABLET, FILM COATED ORAL at 20:37

## 2022-01-01 RX ADMIN — OXYCODONE 5 MG: 5 TABLET ORAL at 09:11

## 2022-01-01 RX ADMIN — Medication 1 TABLET: at 09:03

## 2022-01-01 RX ADMIN — Medication 400 UNITS: at 09:00

## 2022-01-01 RX ADMIN — OXYCODONE 5 MG: 5 TABLET ORAL at 18:26

## 2022-01-01 RX ADMIN — ESTROGENS, CONJUGATED 0.3 MG: 0.3 TABLET, FILM COATED ORAL at 09:08

## 2022-01-01 RX ADMIN — Medication 25 MG: at 20:37

## 2022-01-01 RX ADMIN — Medication 500 MG: at 09:07

## 2022-01-01 RX ADMIN — OXYCODONE 5 MG: 5 TABLET ORAL at 02:28

## 2022-01-01 RX ADMIN — METOPROLOL SUCCINATE 125 MG: 25 TABLET, EXTENDED RELEASE ORAL at 09:19

## 2022-01-01 RX ADMIN — FAMOTIDINE 20 MG: 20 TABLET, FILM COATED ORAL at 16:09

## 2022-01-01 RX ADMIN — HYDROMORPHONE HYDROCHLORIDE 0.5 MG: 1 INJECTION, SOLUTION INTRAMUSCULAR; INTRAVENOUS; SUBCUTANEOUS at 19:25

## 2022-01-01 RX ADMIN — BACITRACIN ZINC, NEOMYCIN, POLYMYXIN B 1 APPLICATION: 400; 3.5; 5 OINTMENT TOPICAL at 21:11

## 2022-01-01 RX ADMIN — Medication 400 UNITS: at 08:18

## 2022-01-01 RX ADMIN — CETIRIZINE HYDROCHLORIDE 10 MG: 10 TABLET, FILM COATED ORAL at 09:21

## 2022-01-01 RX ADMIN — METOPROLOL SUCCINATE 100 MG: 50 TABLET, FILM COATED, EXTENDED RELEASE ORAL at 08:46

## 2022-01-01 RX ADMIN — SODIUM CHLORIDE 500 ML: 9 INJECTION, SOLUTION INTRAVENOUS at 06:15

## 2022-01-01 RX ADMIN — CETIRIZINE HYDROCHLORIDE 10 MG: 10 TABLET, FILM COATED ORAL at 08:50

## 2022-01-01 RX ADMIN — PYRIDOXINE HCL TAB 50 MG 25 MG: 50 TAB at 20:00

## 2022-01-01 RX ADMIN — LORAZEPAM 0.5 MG: 0.5 TABLET ORAL at 13:57

## 2022-01-01 RX ADMIN — METOPROLOL SUCCINATE 50 MG: 50 TABLET, EXTENDED RELEASE ORAL at 08:39

## 2022-01-01 RX ADMIN — ATORVASTATIN CALCIUM 80 MG: 40 TABLET, FILM COATED ORAL at 21:58

## 2022-01-01 RX ADMIN — Medication 400 UNITS: at 08:48

## 2022-01-01 RX ADMIN — CYCLOBENZAPRINE 5 MG: 10 TABLET, FILM COATED ORAL at 17:49

## 2022-01-01 RX ADMIN — Medication 5000 UNITS: at 08:46

## 2022-01-01 RX ADMIN — BACITRACIN ZINC, NEOMYCIN, POLYMYXIN B 1 APPLICATION: 400; 3.5; 5 OINTMENT TOPICAL at 21:23

## 2022-01-01 RX ADMIN — Medication 100 MCG: at 17:15

## 2022-01-01 RX ADMIN — SUGAMMADEX 200 MG: 100 INJECTION, SOLUTION INTRAVENOUS at 18:36

## 2022-01-01 RX ADMIN — CLOPIDOGREL 75 MG: 75 TABLET, FILM COATED ORAL at 08:36

## 2022-01-01 RX ADMIN — Medication 1000 MCG: at 08:19

## 2022-01-01 RX ADMIN — CYCLOBENZAPRINE 5 MG: 10 TABLET, FILM COATED ORAL at 05:39

## 2022-01-01 RX ADMIN — Medication 100 MCG: at 18:16

## 2022-01-01 RX ADMIN — FAMOTIDINE 20 MG: 20 TABLET, FILM COATED ORAL at 09:33

## 2022-01-01 RX ADMIN — ESTROGENS, CONJUGATED 0.3 MG: 0.3 TABLET, FILM COATED ORAL at 09:33

## 2022-01-01 RX ADMIN — OXYCODONE 5 MG: 5 TABLET ORAL at 00:06

## 2022-01-01 RX ADMIN — MAGNESIUM SULFATE HEPTAHYDRATE 4 G: 40 INJECTION, SOLUTION INTRAVENOUS at 10:25

## 2022-01-01 RX ADMIN — ONDANSETRON HYDROCHLORIDE 4 MG: 4 TABLET, FILM COATED ORAL at 08:52

## 2022-01-01 RX ADMIN — OXYCODONE 5 MG: 5 TABLET ORAL at 14:00

## 2022-01-01 RX ADMIN — CYCLOBENZAPRINE 5 MG: 10 TABLET, FILM COATED ORAL at 11:22

## 2022-01-01 RX ADMIN — BACITRACIN ZINC, NEOMYCIN, POLYMYXIN B 1 APPLICATION: 400; 3.5; 5 OINTMENT TOPICAL at 08:37

## 2022-01-01 RX ADMIN — OXYCODONE 5 MG: 5 TABLET ORAL at 13:06

## 2022-01-01 RX ADMIN — METOPROLOL SUCCINATE 50 MG: 50 TABLET, EXTENDED RELEASE ORAL at 08:35

## 2022-01-01 RX ADMIN — CYCLOBENZAPRINE 5 MG: 10 TABLET, FILM COATED ORAL at 19:55

## 2022-01-01 RX ADMIN — APIXABAN 5 MG: 5 TABLET, FILM COATED ORAL at 21:12

## 2022-01-01 RX ADMIN — Medication 1 TABLET: at 08:39

## 2022-01-01 RX ADMIN — CYCLOBENZAPRINE 5 MG: 10 TABLET, FILM COATED ORAL at 08:34

## 2022-01-01 RX ADMIN — ACETAMINOPHEN 1000 MG: 500 TABLET, FILM COATED ORAL at 06:16

## 2022-01-01 RX ADMIN — Medication 25 MG: at 16:50

## 2022-01-01 RX ADMIN — VALSARTAN 80 MG: 80 TABLET, FILM COATED ORAL at 09:18

## 2022-01-01 RX ADMIN — Medication 400 UNITS: at 20:09

## 2022-01-01 RX ADMIN — SODIUM CHLORIDE, POTASSIUM CHLORIDE, SODIUM LACTATE AND CALCIUM CHLORIDE 9 ML/HR: 600; 310; 30; 20 INJECTION, SOLUTION INTRAVENOUS at 15:50

## 2022-01-01 RX ADMIN — Medication 25 MG: at 09:04

## 2022-01-01 RX ADMIN — POLYETHYLENE GLYCOL (3350) 17 G: 17 POWDER, FOR SOLUTION ORAL at 08:37

## 2022-01-01 RX ADMIN — HYDROCODONE BITARTRATE AND ACETAMINOPHEN 1 TABLET: 5; 325 TABLET ORAL at 08:00

## 2022-01-01 RX ADMIN — OXYCODONE 5 MG: 5 TABLET ORAL at 02:27

## 2022-01-01 RX ADMIN — Medication 1000 MCG: at 08:47

## 2022-01-01 RX ADMIN — LIDOCAINE HYDROCHLORIDE 70.3 MG: 20 INJECTION, SOLUTION INTRAVENOUS at 23:32

## 2022-01-01 RX ADMIN — BACITRACIN ZINC, NEOMYCIN, POLYMYXIN B 1 APPLICATION: 400; 3.5; 5 OINTMENT TOPICAL at 20:39

## 2022-01-01 RX ADMIN — CYCLOBENZAPRINE 5 MG: 10 TABLET, FILM COATED ORAL at 08:39

## 2022-01-01 RX ADMIN — CEFAZOLIN SODIUM 2 G: 2 INJECTION, SOLUTION INTRAVENOUS at 23:36

## 2022-01-01 RX ADMIN — OXYCODONE 5 MG: 5 TABLET ORAL at 13:19

## 2022-01-01 RX ADMIN — OXYCODONE 5 MG: 5 TABLET ORAL at 05:41

## 2022-01-01 RX ADMIN — OXYCODONE 5 MG: 5 TABLET ORAL at 12:51

## 2022-01-01 RX ADMIN — Medication 25 MG: at 20:09

## 2022-01-01 RX ADMIN — ESTROGENS, CONJUGATED 0.3 MG: 0.3 TABLET, FILM COATED ORAL at 09:20

## 2022-01-01 RX ADMIN — BACITRACIN ZINC, NEOMYCIN, POLYMYXIN B 1 APPLICATION: 400; 3.5; 5 OINTMENT TOPICAL at 08:11

## 2022-01-01 RX ADMIN — SODIUM BICARBONATE 50 MEQ: 84 INJECTION INTRAVENOUS at 23:32

## 2022-01-01 RX ADMIN — FAMOTIDINE 20 MG: 20 TABLET, FILM COATED ORAL at 08:28

## 2022-01-01 RX ADMIN — FAMOTIDINE 20 MG: 20 TABLET, FILM COATED ORAL at 17:45

## 2022-01-01 RX ADMIN — HYDROCODONE BITARTRATE AND ACETAMINOPHEN 1 TABLET: 5; 325 TABLET ORAL at 16:58

## 2022-01-01 RX ADMIN — OXYCODONE 5 MG: 5 TABLET ORAL at 17:34

## 2022-01-01 RX ADMIN — METOPROLOL SUCCINATE 100 MG: 50 TABLET, FILM COATED, EXTENDED RELEASE ORAL at 09:54

## 2022-01-01 RX ADMIN — FAMOTIDINE 20 MG: 10 INJECTION INTRAVENOUS at 15:50

## 2022-01-01 RX ADMIN — Medication 400 UNITS: at 20:43

## 2022-01-01 RX ADMIN — METOPROLOL SUCCINATE 25 MG: 25 TABLET, FILM COATED, EXTENDED RELEASE ORAL at 07:45

## 2022-01-01 RX ADMIN — OXYCODONE 5 MG: 5 TABLET ORAL at 17:36

## 2022-01-01 RX ADMIN — Medication 1 MG: at 23:37

## 2022-01-01 RX ADMIN — HYDROCODONE BITARTRATE AND ACETAMINOPHEN 1 TABLET: 5; 325 TABLET ORAL at 16:25

## 2022-01-01 RX ADMIN — FAMOTIDINE 20 MG: 20 TABLET, FILM COATED ORAL at 17:03

## 2022-01-01 RX ADMIN — HYDROMORPHONE HYDROCHLORIDE 0.5 MG: 1 INJECTION, SOLUTION INTRAMUSCULAR; INTRAVENOUS; SUBCUTANEOUS at 09:31

## 2022-01-01 RX ADMIN — OXYCODONE 5 MG: 5 TABLET ORAL at 09:48

## 2022-01-01 RX ADMIN — CALCIUM CHLORIDE 1 G: 100 INJECTION, SOLUTION INTRAVENOUS at 23:33

## 2022-01-01 RX ADMIN — BUPIVACAINE HYDROCHLORIDE 30 ML: 2.5 INJECTION, SOLUTION EPIDURAL; INFILTRATION; INTRACAUDAL; PERINEURAL at 22:26

## 2022-01-01 RX ADMIN — LIDOCAINE HYDROCHLORIDE 5 ML: 20 INJECTION, SOLUTION INFILTRATION; PERINEURAL at 22:25

## 2022-01-01 RX ADMIN — Medication 400 UNITS: at 21:30

## 2022-01-01 RX ADMIN — DIGOXIN 125 MCG: 125 TABLET ORAL at 12:19

## 2022-01-01 RX ADMIN — Medication 1000 MCG: at 09:19

## 2022-01-01 RX ADMIN — POLYETHYLENE GLYCOL (3350) 17 G: 17 POWDER, FOR SOLUTION ORAL at 08:20

## 2022-01-01 RX ADMIN — PYRIDOXINE HCL TAB 50 MG 25 MG: 50 TAB at 20:46

## 2022-01-01 RX ADMIN — HYDROMORPHONE HYDROCHLORIDE 0.5 MG: 1 INJECTION, SOLUTION INTRAMUSCULAR; INTRAVENOUS; SUBCUTANEOUS at 17:44

## 2022-01-01 RX ADMIN — ATORVASTATIN CALCIUM 80 MG: 40 TABLET, FILM COATED ORAL at 21:01

## 2022-01-01 RX ADMIN — OXYCODONE 5 MG: 5 TABLET ORAL at 09:10

## 2022-01-01 RX ADMIN — Medication 5000 UNITS: at 08:47

## 2022-01-01 RX ADMIN — METOPROLOL TARTRATE 25 MG: 25 TABLET, FILM COATED ORAL at 10:43

## 2022-01-01 RX ADMIN — Medication 25 MG: at 15:50

## 2022-01-01 RX ADMIN — Medication 25 MG: at 15:05

## 2022-01-01 RX ADMIN — OXYCODONE 5 MG: 5 TABLET ORAL at 09:34

## 2022-01-01 RX ADMIN — Medication 1000 MCG: at 08:29

## 2022-01-01 RX ADMIN — Medication 1 MG: at 23:31

## 2022-01-01 RX ADMIN — METOPROLOL SUCCINATE 100 MG: 50 TABLET, EXTENDED RELEASE ORAL at 22:38

## 2022-01-01 RX ADMIN — OXYCODONE 5 MG: 5 TABLET ORAL at 10:07

## 2022-01-01 RX ADMIN — Medication 500 MG: at 09:21

## 2022-01-01 RX ADMIN — ROCURONIUM BROMIDE 50 MG: 10 INJECTION INTRAVENOUS at 17:06

## 2022-01-01 RX ADMIN — SENNOSIDES AND DOCUSATE SODIUM 2 TABLET: 50; 8.6 TABLET ORAL at 08:36

## 2022-01-01 RX ADMIN — OXYCODONE 5 MG: 5 TABLET ORAL at 03:57

## 2022-01-01 RX ADMIN — ACETAMINOPHEN 1000 MG: 500 TABLET, FILM COATED ORAL at 14:51

## 2022-01-01 RX ADMIN — CLOPIDOGREL 75 MG: 75 TABLET, FILM COATED ORAL at 08:50

## 2022-01-01 RX ADMIN — CYCLOBENZAPRINE 5 MG: 10 TABLET, FILM COATED ORAL at 01:24

## 2022-01-01 RX ADMIN — CYCLOBENZAPRINE 5 MG: 10 TABLET, FILM COATED ORAL at 05:21

## 2022-01-01 RX ADMIN — APIXABAN 5 MG: 5 TABLET, FILM COATED ORAL at 08:36

## 2022-01-01 RX ADMIN — LORAZEPAM 0.5 MG: 0.5 TABLET ORAL at 23:08

## 2022-01-01 RX ADMIN — Medication 5000 UNITS: at 08:45

## 2022-01-01 RX ADMIN — APIXABAN 5 MG: 5 TABLET, FILM COATED ORAL at 09:54

## 2022-01-01 RX ADMIN — APIXABAN 5 MG: 5 TABLET, FILM COATED ORAL at 09:21

## 2022-01-01 RX ADMIN — CYCLOBENZAPRINE 5 MG: 10 TABLET, FILM COATED ORAL at 02:36

## 2022-01-01 RX ADMIN — Medication 500 MG: at 09:04

## 2022-01-01 RX ADMIN — SENNOSIDES AND DOCUSATE SODIUM 2 TABLET: 50; 8.6 TABLET ORAL at 21:11

## 2022-01-01 RX ADMIN — Medication 1 TABLET: at 09:08

## 2022-01-01 RX ADMIN — HYDROMORPHONE HYDROCHLORIDE 0.5 MG: 1 INJECTION, SOLUTION INTRAMUSCULAR; INTRAVENOUS; SUBCUTANEOUS at 09:21

## 2022-01-01 RX ADMIN — ENOXAPARIN SODIUM 30 MG: 30 INJECTION SUBCUTANEOUS at 20:49

## 2022-01-01 RX ADMIN — CYCLOBENZAPRINE 5 MG: 10 TABLET, FILM COATED ORAL at 08:46

## 2022-01-01 RX ADMIN — CLOPIDOGREL 75 MG: 75 TABLET, FILM COATED ORAL at 08:47

## 2022-01-01 RX ADMIN — ESTROGENS, CONJUGATED 0.3 MG: 0.3 TABLET, FILM COATED ORAL at 08:46

## 2022-01-01 RX ADMIN — FAMOTIDINE 20 MG: 20 TABLET, FILM COATED ORAL at 09:54

## 2022-01-01 RX ADMIN — ATORVASTATIN CALCIUM 80 MG: 40 TABLET, FILM COATED ORAL at 21:16

## 2022-01-01 RX ADMIN — Medication 10 ML: at 09:21

## 2022-01-01 RX ADMIN — VALSARTAN 320 MG: 160 TABLET, FILM COATED ORAL at 08:46

## 2022-01-01 RX ADMIN — ACETAMINOPHEN 650 MG: 325 TABLET, FILM COATED ORAL at 21:08

## 2022-01-01 RX ADMIN — MIDODRINE HYDROCHLORIDE 2.5 MG: 5 TABLET ORAL at 10:43

## 2022-01-01 RX ADMIN — Medication 1 MG: at 23:25

## 2022-01-01 RX ADMIN — METOPROLOL SUCCINATE 125 MG: 25 TABLET, EXTENDED RELEASE ORAL at 08:50

## 2022-01-01 RX ADMIN — FAMOTIDINE 20 MG: 20 TABLET ORAL at 08:35

## 2022-01-01 RX ADMIN — ACETAMINOPHEN 650 MG: 325 TABLET, FILM COATED ORAL at 11:49

## 2022-01-01 RX ADMIN — CLOPIDOGREL 75 MG: 75 TABLET, FILM COATED ORAL at 09:33

## 2022-01-01 RX ADMIN — Medication 25 MG: at 22:40

## 2022-01-01 RX ADMIN — ESTROGENS, CONJUGATED 0.3 MG: 0.3 TABLET, FILM COATED ORAL at 08:36

## 2022-01-01 RX ADMIN — Medication 500 MG: at 08:50

## 2022-01-01 RX ADMIN — ATORVASTATIN CALCIUM 80 MG: 40 TABLET, FILM COATED ORAL at 20:09

## 2022-01-01 RX ADMIN — ENOXAPARIN SODIUM 30 MG: 30 INJECTION SUBCUTANEOUS at 19:56

## 2022-01-01 RX ADMIN — ESTROGENS, CONJUGATED 0.3 MG: 0.3 TABLET, FILM COATED ORAL at 08:28

## 2022-01-01 RX ADMIN — FAMOTIDINE 20 MG: 20 TABLET, FILM COATED ORAL at 16:33

## 2022-01-01 RX ADMIN — METOPROLOL SUCCINATE 50 MG: 50 TABLET, EXTENDED RELEASE ORAL at 22:19

## 2022-01-01 RX ADMIN — ATORVASTATIN CALCIUM 80 MG: 40 TABLET, FILM COATED ORAL at 08:39

## 2022-01-01 RX ADMIN — Medication 1 TABLET: at 08:00

## 2022-01-01 RX ADMIN — HYDROMORPHONE HYDROCHLORIDE 0.5 MG: 1 INJECTION, SOLUTION INTRAMUSCULAR; INTRAVENOUS; SUBCUTANEOUS at 11:11

## 2022-01-01 RX ADMIN — METOPROLOL SUCCINATE 125 MG: 25 TABLET, EXTENDED RELEASE ORAL at 08:47

## 2022-01-01 RX ADMIN — APIXABAN 5 MG: 5 TABLET, FILM COATED ORAL at 21:15

## 2022-01-01 RX ADMIN — ENOXAPARIN SODIUM 30 MG: 30 INJECTION SUBCUTANEOUS at 21:17

## 2022-01-01 RX ADMIN — BACITRACIN ZINC, NEOMYCIN, POLYMYXIN B 1 APPLICATION: 400; 3.5; 5 OINTMENT TOPICAL at 09:22

## 2022-01-01 RX ADMIN — Medication 5000 UNITS: at 09:08

## 2022-01-01 RX ADMIN — Medication 5000 UNITS: at 09:03

## 2022-01-01 RX ADMIN — Medication 1 TABLET: at 09:54

## 2022-01-01 RX ADMIN — MIDODRINE HYDROCHLORIDE 2.5 MG: 5 TABLET ORAL at 08:39

## 2022-01-01 RX ADMIN — Medication 1 TABLET: at 08:37

## 2022-01-01 RX ADMIN — Medication 500 MG: at 09:56

## 2022-01-01 RX ADMIN — APIXABAN 5 MG: 5 TABLET, FILM COATED ORAL at 21:00

## 2022-01-01 RX ADMIN — Medication 5000 UNITS: at 09:00

## 2022-01-01 RX ADMIN — HYDROCODONE BITARTRATE AND ACETAMINOPHEN 1 TABLET: 5; 325 TABLET ORAL at 06:32

## 2022-01-01 RX ADMIN — CYCLOBENZAPRINE 5 MG: 10 TABLET, FILM COATED ORAL at 15:47

## 2022-01-01 RX ADMIN — Medication 5000 UNITS: at 08:25

## 2022-01-01 RX ADMIN — Medication 400 UNITS: at 08:36

## 2022-01-01 RX ADMIN — OXYCODONE 5 MG: 5 TABLET ORAL at 01:54

## 2022-01-01 RX ADMIN — CYCLOBENZAPRINE 5 MG: 10 TABLET, FILM COATED ORAL at 21:18

## 2022-01-01 RX ADMIN — FAMOTIDINE 20 MG: 20 TABLET, FILM COATED ORAL at 08:46

## 2022-01-01 RX ADMIN — VALSARTAN 80 MG: 80 TABLET, FILM COATED ORAL at 08:20

## 2022-01-01 RX ADMIN — ONDANSETRON HYDROCHLORIDE 4 MG: 4 TABLET, FILM COATED ORAL at 09:00

## 2022-01-01 RX ADMIN — METOPROLOL SUCCINATE 50 MG: 50 TABLET, EXTENDED RELEASE ORAL at 08:03

## 2022-01-01 RX ADMIN — CYCLOBENZAPRINE 5 MG: 10 TABLET, FILM COATED ORAL at 12:51

## 2022-01-01 RX ADMIN — FENTANYL CITRATE 100 MCG: 50 INJECTION, SOLUTION INTRAMUSCULAR; INTRAVENOUS at 16:26

## 2022-01-01 RX ADMIN — FAMOTIDINE 20 MG: 20 TABLET ORAL at 08:01

## 2022-01-01 RX ADMIN — ATORVASTATIN CALCIUM 80 MG: 40 TABLET, FILM COATED ORAL at 08:37

## 2022-01-01 RX ADMIN — VALSARTAN 80 MG: 80 TABLET, FILM COATED ORAL at 09:33

## 2022-01-01 RX ADMIN — ACETAMINOPHEN 650 MG: 325 TABLET, FILM COATED ORAL at 00:20

## 2022-01-01 RX ADMIN — APIXABAN 5 MG: 5 TABLET, FILM COATED ORAL at 09:07

## 2022-01-01 RX ADMIN — Medication 25 MG: at 17:00

## 2022-01-01 RX ADMIN — ACETAMINOPHEN 1000 MG: 500 TABLET, FILM COATED ORAL at 20:00

## 2022-01-01 RX ADMIN — BACITRACIN ZINC, NEOMYCIN, POLYMYXIN B 1 APPLICATION: 400; 3.5; 5 OINTMENT TOPICAL at 21:16

## 2022-01-01 RX ADMIN — TRANEXAMIC ACID 1000 MG: 10 INJECTION, SOLUTION INTRAVENOUS at 18:25

## 2022-01-01 RX ADMIN — CETIRIZINE HYDROCHLORIDE 10 MG: 10 TABLET, FILM COATED ORAL at 08:29

## 2022-01-01 RX ADMIN — BACITRACIN ZINC, NEOMYCIN, POLYMYXIN B 1 APPLICATION: 400; 3.5; 5 OINTMENT TOPICAL at 21:17

## 2022-01-01 RX ADMIN — CYCLOBENZAPRINE 5 MG: 10 TABLET, FILM COATED ORAL at 20:46

## 2022-01-01 RX ADMIN — CYCLOBENZAPRINE 5 MG: 10 TABLET, FILM COATED ORAL at 14:36

## 2022-01-01 RX ADMIN — Medication 1 TABLET: at 08:19

## 2022-01-01 RX ADMIN — POLYETHYLENE GLYCOL (3350) 17 G: 17 POWDER, FOR SOLUTION ORAL at 08:46

## 2022-01-01 RX ADMIN — Medication 400 UNITS: at 08:46

## 2022-01-01 RX ADMIN — OXYCODONE 5 MG: 5 TABLET ORAL at 00:12

## 2022-01-01 RX ADMIN — Medication 5000 UNITS: at 09:15

## 2022-01-01 RX ADMIN — OXYCODONE 5 MG: 5 TABLET ORAL at 08:17

## 2022-01-01 RX ADMIN — ENOXAPARIN SODIUM 30 MG: 30 INJECTION SUBCUTANEOUS at 08:40

## 2022-01-01 RX ADMIN — Medication 5000 UNITS: at 09:33

## 2022-01-01 RX ADMIN — CYCLOBENZAPRINE 5 MG: 10 TABLET, FILM COATED ORAL at 21:22

## 2022-01-01 RX ADMIN — Medication 10 ML: at 20:45

## 2022-01-01 RX ADMIN — CYCLOBENZAPRINE 5 MG: 10 TABLET, FILM COATED ORAL at 08:37

## 2022-01-01 RX ADMIN — BACITRACIN ZINC, NEOMYCIN, POLYMYXIN B 1 APPLICATION: 400; 3.5; 5 OINTMENT TOPICAL at 16:09

## 2022-01-01 RX ADMIN — METOPROLOL SUCCINATE 125 MG: 25 TABLET, EXTENDED RELEASE ORAL at 08:37

## 2022-01-01 RX ADMIN — CLOPIDOGREL 75 MG: 75 TABLET, FILM COATED ORAL at 08:44

## 2022-01-01 RX ADMIN — CLOPIDOGREL BISULFATE 75 MG: 75 TABLET ORAL at 08:28

## 2022-01-01 RX ADMIN — CYCLOBENZAPRINE 5 MG: 10 TABLET, FILM COATED ORAL at 20:48

## 2022-01-01 RX ADMIN — METOPROLOL SUCCINATE 100 MG: 50 TABLET, FILM COATED, EXTENDED RELEASE ORAL at 10:13

## 2022-01-01 RX ADMIN — FAMOTIDINE 20 MG: 20 TABLET, FILM COATED ORAL at 16:36

## 2022-01-01 RX ADMIN — OXYCODONE 5 MG: 5 TABLET ORAL at 02:54

## 2022-01-01 RX ADMIN — Medication 1 MG: at 23:34

## 2022-01-01 RX ADMIN — BACITRACIN ZINC, NEOMYCIN, POLYMYXIN B 1 APPLICATION: 400; 3.5; 5 OINTMENT TOPICAL at 08:21

## 2022-01-01 RX ADMIN — FENTANYL CITRATE 50 MCG: 50 INJECTION, SOLUTION INTRAMUSCULAR; INTRAVENOUS at 19:44

## 2022-01-01 RX ADMIN — APIXABAN 5 MG: 5 TABLET, FILM COATED ORAL at 09:00

## 2022-01-01 RX ADMIN — PYRIDOXINE HCL TAB 50 MG 25 MG: 50 TAB at 08:24

## 2022-01-01 RX ADMIN — CYCLOBENZAPRINE 5 MG: 10 TABLET, FILM COATED ORAL at 21:08

## 2022-08-18 PROBLEM — S72.002A LEFT DISPLACED FEMORAL NECK FRACTURE (HCC): Status: ACTIVE | Noted: 2022-01-01
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
## 2022-08-19 NOTE — ANESTHESIA POSTPROCEDURE EVALUATION
Patient: Eloina Batista    Procedure Summary     Date: 08/19/22 [facility] / [facility]:  [facility]    Anesthesia Start: 1702 Anesthesia Stop: 1903    Procedure: LEFT HIP HEMIARTHROPLASTY (Left Hip) Diagnosis:       Closed left hip fracture, initial encounter ([facility])      (Closed left hip fracture, initial encounter ([facility]) [S72.002A])    Surgeons: Jeramy Reid Jr., MD Provider: Tona Henry DO    Anesthesia Type: general with block ASA Status: 3          Anesthesia Type: general with block    Vitals  No vitals data found for the desired time range.          Post Anesthesia Care and Evaluation    Patient location during evaluation: PACU  Patient participation: complete - patient participated  Level of consciousness: awake and alert  Pain score: 0  Pain management: adequate    Airway patency: patent  Anesthetic complications: No anesthetic complications  PONV Status: none  Cardiovascular status: hemodynamically stable and acceptable  Respiratory status: nonlabored ventilation, acceptable and nasal cannula  Hydration status: acceptable

## 2022-08-19 NOTE — ANESTHESIA PROCEDURE NOTES
Airway  Urgency: elective    Date/Time: 8/19/2022 5:08 PM  Airway not difficult    General Information and Staff    Patient location during procedure: OR  CRNA/CAA: Manuel Borjas CRNA    Indications and Patient Condition  Indications for airway management: airway protection    Preoxygenated: yes  MILS not maintained throughout  Mask difficulty assessment: 2 - vent by mask + OA or adjuvant +/- NMBA    Final Airway Details  Final airway type: endotracheal airway      Successful airway: ETT  Cuffed: yes   Successful intubation technique: direct laryngoscopy  Facilitating devices/methods: intubating stylet  Endotracheal tube insertion site: oral  Blade: Peng  Blade size: 2  ETT size (mm): 7.0  Cormack-Lehane Classification: grade I - full view of glottis  Placement verified by: chest auscultation and capnometry   Cuff volume (mL): 5  Measured from: lips  ETT/EBT  to lips (cm): 20  Number of attempts at approach: 1  Assessment: lips, teeth, and gum same as pre-op and atraumatic intubation    Additional Comments  Negative epigastric sounds, Breath sound equal bilaterally with symmetric chest rise and fall

## 2022-08-19 NOTE — ANESTHESIA PROCEDURE NOTES
Peripheral Block      Patient reassessed immediately prior to procedure    Patient location during procedure: pre-op  Reason for block: procedure for pain and at surgeon's request  Performed by  Anesthesiologist: Tona Henry DO  CRNA/CAA: Zaira Butler CRNA  Preanesthetic Checklist  Completed: patient identified, IV checked, site marked, risks and benefits discussed, surgical consent, monitors and equipment checked, pre-op evaluation and timeout performed  Prep:  Sterile barriers:cap, gloves and mask  Prep: ChloraPrep  Patient monitoring: blood pressure monitoring, continuous pulse oximetry and EKG  Procedure  Performed under: local infiltration  Guidance:ultrasound guided  Images:still images obtained, printed/placed on chart    Block Type:fascia iliaca compartment  Injection Technique:catheter  Needle Type:echogenic  Needle Gauge:18 G  Resistance on Injection: none  Catheter Size:20 G (20g)    Medications Used: fentaNYL citrate (PF) (SUBLIMAZE) injection, 100 mcg  ropivacaine (NAROPIN) 0.2 % injection, 50 mL  dexamethasone sodium phosphate injection, 2 mg  Med administered at 8/19/2022 4:26 PM      Medications  Preservative Free Saline:10ml    Post Assessment  Injection Assessment: negative aspiration for heme, no paresthesia on injection and incremental injection  Patient Tolerance:comfortable throughout block  Complications:no  Additional Notes  Procedure:           Pt placed in supine position.   The insertion site was prepped in sterile fashion with Chlorapreop and clear plastic drapes.  Analgesia was provided by skin infiltration at insertion site with Lidocaine 1% 3mls.  A B-Martinez 18 g , 4 inch echogenic STIMUPLEX needle was advance In-plane under ultrasound guidance. The   Anterior superior Iliac crest was initially visualized and the probe was directed slightly medially and slightly towards the umbilicus.  The course of the needle was tracked over the sartorius muscle through the fascia  Iliacus and into the anterior portion of the Iliacus muscle.  Major vessels where identified and avoided as where structures of the peritoneal cavity.  LA injection was made incrementally in 1-5ml amounts spread was visualized superiorly below fascia iliacus.  Injection was completed with negative aspiration of blood and negative intravascular injection.  Injection pressures where normal or minimal resistance.  A 20 g B-Martinez wire styleted catheter was then advance thru the needle and very easily placed in a superior or cephalad direction.  The catheter was secured at insertion site with exofin tissue adhesive, benzoin, and steristreps.  A CHG tegaderm dressing was placed over the insertion site and the nerve catheter labeled and capped.  Thank You.

## 2022-08-19 NOTE — ANESTHESIA PREPROCEDURE EVALUATION
Anesthesia Evaluation     Patient summary reviewed and Nursing notes reviewed   no history of anesthetic complications:  NPO Solid Status: > 8 hours  NPO Liquid Status: > 8 hours           Airway   Mallampati: II  TM distance: >3 FB  Neck ROM: full  No difficulty expected  Dental    (+) edentulous    Pulmonary - normal exam   (-) not a smoker  Cardiovascular - normal exam    ECG reviewed  PT is on anticoagulation therapy    (+) hypertension, CAD, CABG, cardiac stents hyperlipidemia,     ROS comment:     Hypertension-suboptimal control   -CAD CABG x2 SVG-D1 LIMA-LAD 2002, NSTEMI 3/2017-[facility] in-stent restenosis of SVG-D1 with angioplasty, patent LCx and RCA stents, patent LIMA-LAD     Stent 2006, rewiring sternum 2010    Neuro/Psych  (-) seizures, TIA  GI/Hepatic/Renal/Endo    (+)  GERD well controlled,    (-) no renal disease, diabetes, no thyroid disorder    Musculoskeletal     Abdominal    Substance History - negative use     OB/GYN          Other   arthritis,      ROS/Med Hx Other: plavix  hgb 14.8 k 3.8                Anesthesia Plan    ASA 3     general with block     (Risks and benefits of general anesthesia discussed with patient (including MI, CVA, death, recall, aspiration, oropharyngeal/dental damage), questions answered, agreeable to proceed.  Benefits and risks of nerve block/catheter discussed with patient ((including failed block, damage to nerve/nearby structures, intravascular injection)); questions answered, agreeable to proceed.    )  intravenous induction     Anesthetic plan, risks, benefits, and alternatives have been provided, discussed and informed consent has been obtained with: patient and spouse/significant other.    Use of blood products discussed with patient and spouse/significant other  Consented to blood products.       CODE STATUS:    Code Status (Patient has no pulse and is not breathing): CPR (Attempt to Resuscitate)  Medical Interventions (Patient has pulse or is breathing): Full  Support

## 2022-08-21 PROBLEM — N18.31 STAGE 3A CHRONIC KIDNEY DISEASE (HCC): Chronic | Status: ACTIVE | Noted: 2022-01-01

## 2022-08-21 PROBLEM — I48.91 ATRIAL FIBRILLATION WITH RAPID VENTRICULAR RESPONSE (HCC): Status: ACTIVE | Noted: 2022-01-01

## 2022-08-21 PROBLEM — I48.20 CHRONIC ATRIAL FIBRILLATION WITH RAPID VENTRICULAR RESPONSE (HCC): Status: ACTIVE | Noted: 2022-01-01

## 2022-08-21 NOTE — ANESTHESIA PROCEDURE NOTES
[facility]       Patient reassessed immediately prior to procedure    Patient location during procedure: pre-op  Reason for block: procedure for pain and at surgeon's request  Performed by  Anesthesiologist: Ebony Byrne MD  CRNA/CAA: Hood Gutierrez CRNA  Preanesthetic Checklist  Completed: patient identified, IV checked, site marked, risks and benefits discussed, surgical consent, monitors and equipment checked, pre-op evaluation and timeout performed  Prep:  Pt Position: supine  Sterile barriers:cap, gloves, mask and washed/disinfected hands  Prep: ChloraPrep  Patient monitoring: blood pressure monitoring, continuous pulse oximetry and EKG  Procedure    Sedation: no  Performed under: local infiltration  Guidance:ultrasound guided  Images:still images obtained, printed/placed on chart    Laterality:left  Block Type:fascia iliaca compartment  Injection Technique:single-shot  Needle Type:echogenic  Needle Gauge:20 G  Resistance on Injection: none  Catheter size: 20g.    Medications Used: ropivacaine (NAROPIN) 0.2 % injection, 50 mL  Med administered at 8/21/2022 8:48 AM      Medications  Preservative Free Saline:0ml    Post Assessment  Injection Assessment: negative aspiration for heme, no paresthesia on injection and incremental injection  Patient Tolerance:comfortable throughout block  Complications:no  Additional Notes  Procedure:                 Pt placed in supine position.   The insertion site was prepped in sterile fashion with Chlorapreop and clear plastic drapes.  Analgesia was provided by skin infiltration at insertion site with Lidocaine 1% 3mls.  A B-Martinez 18 g , 4 inch echogenic Touhy needle was advance In-plane under ultrasound guidance. The   Anterior superior Iliac crest was initially visualized and the probe was directed slightly medially and slightly towards the umbilicus.  The course of the needle was tracked over the sartorius muscle through the fascia Iliacus and into the anterior  portion of the Iliacus muscle.  Major vessels where identified and avoided as where structures of the peritoneal cavity.  LA injection was made incrementally in 1-5ml amounts spread was visualized superiorly below fascia iliacus.  Injection was completed with negative aspiration of blood and negative intravascular injection.  Injection pressures where normal or minimal resistance.

## 2022-08-21 NOTE — ADDENDUM NOTE
Addendum  created 08/21/22 0922 by Hood Gutierrez, CRNA    Clinical Note Signed, Intraprocedure Blocks edited, Order list changed

## 2022-08-21 NOTE — ADDENDUM NOTE
Addendum  created 08/21/22 0848 by Hood Gutierrez, CRNA    Clinical Note Signed, Intraprocedure Blocks edited, LDA deleted via procedure documentation, LDA updated via procedure documentation, Order list changed

## 2022-08-21 NOTE — ADDENDUM NOTE
Addendum  created 08/21/22 0843 by Hood Gutierrez, CRNA    Order list changed, Order sets accessed, Pharmacy for encounter modified

## 2022-08-21 NOTE — ADDENDUM NOTE
Addendum  created 08/21/22 0842 by Hood Gutierrez, CRNA    Child order released for a procedure order, Clinical Note Signed, Intraprocedure Blocks edited, LDA created via procedure documentation

## 2022-08-25 PROBLEM — Z87.81 S/P LEFT HIP FRACTURE: Status: ACTIVE | Noted: 2022-01-01

## 2022-09-01 NOTE — PROGRESS NOTES
Rehabilitation Nursing  Inpatient Rehabilitation Plan of Care Note    Plan of Care  Copy from [facility]    Bed/Chair/Wheelchair (Active)  Current Status (8/25/2022 12:00:00 AM): Increase in physical activity  Weekly Goal: Joint mobility maintained  Discharge Goal: Demonstrate use of adaptive equipment.    Safety    Potential for Injury (Active)  Current Status (8/25/2022 12:00:00 AM): No falls this shift  Weekly Goal: No falls this hospital stay  Discharge Goal: Identy measures to prevent injury    Signed by: Gabriela Frye Nurse

## 2022-09-01 NOTE — PROGRESS NOTES
Assisted By: Jeffery ALBERTS    CC: Follow-up on left hip fracture    Interview History/HPI:   This morning patient is complaining of not being able to move her left leg well.  She states during therapy yesterday she went to sit down and she thinks she hit the side of her leg on the wheelchair.  She is really having no increased pain over her baseline postop but movement is limited.  Denies any fever or chills.  No chest pain or palpitations.          Current Hospital Meds:  apixaban, 5 mg, Oral, Q12H  atorvastatin, 80 mg, Oral, Nightly  calcium carbonate (oyster shell), 500 mg, Oral, Daily  cetirizine, 10 mg, Oral, Daily  clopidogrel, 75 mg, Oral, Daily  estrogens (conjugated), 0.3 mg, Oral, Daily  famotidine, 20 mg, Oral, BID AC  [START ON 9/2/2022] metoprolol succinate XL, 125 mg, Oral, Q24H  multivitamin, 1 tablet, Oral, Daily  neomycin-bacitracin-polymyxin, 1 application, Topical, Q12H  polyethylene glycol, 17 g, Oral, Daily  [START ON 9/2/2022] valsartan, 160 mg, Oral, Q24H  vitamin B-12, 1,000 mcg, Oral, Daily  vitamin B-6, 25 mg, Oral, TID  vitamin D3, 5,000 Units, Oral, Daily  vitamin E, 400 Units, Oral, BID         Vitals:    09/01/22 0741   BP: 124/85   Pulse: 102   Resp: 20   Temp: 97.9 °F (36.6 °C)   SpO2: 93%         Intake/Output Summary (Last 24 hours) at 9/1/2022 1132  Last data filed at 9/1/2022 0900  Gross per 24 hour   Intake 720 ml   Output 250 ml   Net 470 ml       EXAM: She is pleasant, no distress, she can speak in full sense without difficulty, she moves her arms well  strength is symmetric, she can raise her arms above the bed, pupils are equal face is symmetric lungs clear, heart is tachycardic irregular regular rhythm at about 110s, abdomen is soft benign.  Extremities are without edema, she can plantar and dorsiflex her feet.  She appears to have minimal proximal muscle movement however of her left lower extremity, right lower extremity she moves well, the incision appears to be intact,  there is some aging bruising in the distal thigh area.  Sensation is intact.  Left patellar reflex 2+, 1+ Achilles.      Diet Regular        LABS:     Lab Results (last 48 hours)     ** No results found for the last 48 hours. **        Most recent labs from 8/26 reviewed.  Essentially unremarkable sides mildly low sodium.  CBC besides anemia was unremarkable at that time as well.      Radiology:    Imaging Results (Last 72 Hours)     Procedure Component Value Units Date/Time    XR Hip With or Without Pelvis 2 - 3 View Left [[id number]] Resulted: 09/01/22 1026     Updated: 09/01/22 1053          Results for orders placed during the hospital encounter of 08/18/22    Adult Transthoracic Echo Complete W/ Cont if Necessary Per Protocol    Interpretation Summary  · Estimated left ventricular EF = 66% Left ventricular ejection fraction appears to be 66 - 70%. Left ventricular systolic function is hyperdynamic (EF > 70%).  · Mild to moderate aortic valve regurgitation is present.  · Left ventricular wall thickness is consistent with hypertrophy.  · Left ventricular diastolic function was indeterminate.  · There is calcification of the aortic valve.  · Estimated right ventricular systolic pressure from tricuspid regurgitation is normal (<35 mmHg). Calculated right ventricular systolic pressure from tricuspid regurgitation is 33 mmHg.      Assessment/Plan:   Poor mobility left leg, x-ray obtained, depending on results of this may proceed with therapy, certainly she does not appear to have neurologic deficit otherwise.  Patient is status post left hip fracture with ORIF.  With bed mobility yesterday patient had 75% effort, she had 75% effort with bed to chair and chair to bed.  With gait patient had contact-guard in the parallel bars 6 feet forward 6-week backward.  She did have some dizziness with ambulation.    Atrial fibrillation, on Eliquis for stroke prevention which will also serve for DVT prophylaxis, she is still  tachycardic this morning, I have increased Toprol to 125 mg with holding parameters.  In order for her to be able to tolerate this, I have decreased her valsartan 80 mg, patient has normal EF by last echocardiogram.    Coronary artery disease, status post bypass in the past status post PCI most recent by records March 17 to the graft of the diagonal branch.  She had had moderate to severe in-stent stenosis there.  Patient is stable and on Plavix and statin.    Anemia, acute blood loss, recheck in a.m.    Clinically insignificant mild hyponatremia, recheck in a.m.    Kalen Galvez MD

## 2022-09-01 NOTE — THERAPY TREATMENT NOTE
Inpatient Rehabilitation - Occupational Therapy Treatment Note    [facility]     Patient Name: Eloina Batista  : 1940  MRN: [id number]    Today's Date: 2022                 Admit Date: 2022       No diagnosis found.    Patient Active Problem List   Diagnosis   • Chest pain   • CAD (coronary artery disease)   • Essential hypertension   • Hyperlipidemia   • Sebaceous cyst   • Left displaced femoral neck fracture (HCC)   • Fall, initial encounter   • Atrial fibrillation with rapid ventricular response (HCC)   • Stage 3a chronic kidney disease (HCC)   • S/p left hip fracture       Past Medical History:   Diagnosis Date   • Arthritis    • Coronary artery disease    • GERD (gastroesophageal reflux disease)    • Hyperlipidemia    • Hypertension        Past Surgical History:   Procedure Laterality Date   • APPENDECTOMY     • BLADDER REPAIR     • CARDIAC CATHETERIZATION     • CARDIAC CATHETERIZATION N/A 3/22/2017    Procedure: Left Heart Cath;  Surgeon: Alex Blanco MD;  Location: [facility] INVASIVE LOCATION;  Service:    • CARDIAC SURGERY     • CHOLECYSTECTOMY     • CORONARY ARTERY BYPASS GRAFT     • CORONARY STENT PLACEMENT     • HERNIA REPAIR     • HIP HEMIARTHROPLASTY Left 2022    Procedure: HIP HEMIARTHROPLASTY LEFT;  Surgeon: Jeramy Reid Jr., MD;  Location: [facility] OR;  Service: Orthopedics;  Laterality: Left;   • HYSTERECTOMY               IRF OT ASSESSMENT FLOWSHEET (last 12 hours)     IRF OT Evaluation and Treatment     Row Name 22 3414          OT Time and Intention    Document Type daily treatment  -TM     Mode of Treatment occupational therapy  -TM     Patient Effort adequate  -TM     Symptoms Noted During/After Treatment other (see comments)  LLE pain with RN aware/addressing  -TM     Row Name 22 3160          General Information    General Observations of Patient Pt agreeable for therapy. ARISTEO Gil cleared pt for therapy this pm tx session.  -TM     Existing  Precautions/Restrictions fall;left;hip, posterior  -TM     Row Name 09/01/22 1358          Cognition/Psychosocial    Orientation Status (Cognition) oriented to;person;place;situation  -TM     Follows Commands (Cognition) follows one-step commands;verbal cues/prompting required  -TM     Row Name 09/01/22 1358          Grooming    [place] Level (Grooming) set up  -TM     Position (Grooming) supported sitting  -TM     Row Name 09/01/22 1358          Toileting    [place] Level (Toileting) moderate assist (50% patient effort);verbal cues  -TM     Assistive Device Use (Toileting) raised toilet seat;grab bar/safety frame  -TM     Position (Toileting) supported sitting  -TM     Row Name 09/01/22 1358          Transfers    Bed-Chair [place] (Transfers) moderate assist (50% patient effort);verbal cues  -TM     Assistive Device (Bed-Chair Transfers) walker, front-wheeled;wheelchair  -TM     [place] Level (Toilet Transfer) minimum assist (75% patient effort);verbal cues  -TM     Assistive Device (Toilet Transfer) wheelchair;raised toilet seat;grab bars/safety frame  -TM     Row Name 09/01/22 1358          Toilet Transfer    Type (Toilet Transfer) stand pivot/stand step  -TM     Row Name 09/01/22 1358          Motor Skills    Motor Skills coordination;functional endurance;motor control/coordination interventions  -TM     Motor Control/Coordination Interventions therapeutic exercise/ROM;fine motor manipulation/dexterity activities;gross motor coordination activities;other (see comments)  BUE ther ex/act, GMC/FMC  -TM           User Key  (r) = Recorded By, (t) = Taken By, (c) = Cosigned By    Initials Name Effective Dates     Karen Lombardi, BIANCA 06/16/21 -                  Occupational Therapy Education                 Title: PT OT SLP Therapies (Done)     Topic: Occupational Therapy (Done)     Point: ADL training (Done)     Description:   Instruct learner(s) on proper safety adaptation and remediation  techniques during self care or transfers.   Instruct in proper use of assistive devices.              Learning Progress Summary           Patient Acceptance, E,D, VU,NR by TM at 9/1/2022 1357      Show all documentation for this point (5)                 Point: Precautions (Done)     Description:   Instruct learner(s) on prescribed precautions during self-care and functional transfers.              Learning Progress Summary           Patient Acceptance, E,D, VU,NR by TM at 9/1/2022 1357      Show all documentation for this point (5)                             User Key     Initials Effective Dates Name Provider Type Discipline     06/16/21 -  Karen Lombardi OT Occupational Therapist OT                    OT Recommendation and Plan    Planned Therapy Interventions (OT): activity tolerance training, BADL retraining, adaptive equipment training, IADL retraining, occupation/activity based interventions, patient/caregiver education/training, ROM/therapeutic exercise, strengthening exercise, transfer/mobility retraining                    Time Calculation:      Time Calculation- OT     Row Name 09/01/22 1357             Time Calculation- OT    OT Start Time 1240  -TM      OT Stop Time 1410  -TM      OT Time Calculation (min) 90 min  -TM      Total Timed Code Minutes- OT 90 minute(s)  -TM      OT Non-Billable Time (min) 15 min  -TM            User Key  (r) = Recorded By, (t) = Taken By, (c) = Cosigned By    Initials Name Provider Type     Karen Lombardi OT Occupational Therapist              Therapy Charges for Today     Code Description Service Date Service Provider Modifiers Qty    24042833460 HC OT SELF CARE/MGMT/TRAIN EA 15 MIN 8/31/2022 Karen Lombardi OT GO 1    59967832054 HC OT THERAPEUTIC ACT EA 15 MIN 8/31/2022 Karen Lombardi OT GO 3    13917856762 HC OT THER PROC EA 15 MIN 8/31/2022 Karen Lombardi OT GO 2    17403133803 HC OT SELF CARE/MGMT/TRAIN EA 15 MIN 9/1/2022  Karen Lombardi, OT GO 2    [id number]  OT THERAPEUTIC ACT EA 15 MIN 9/1/2022 Karen Lombardi, OT GO 3    84658059031 HC OT THER PROC EA 15 MIN 9/1/2022 Karen Lombardi, OT GO 1                   Karen Lombardi OT  9/1/2022

## 2022-09-01 NOTE — PROGRESS NOTES
Occupational Therapy:    Physical Therapy: Individual: 60 minutes.    Speech Language Pathology:    Signed by: Sergey Finch PT

## 2022-09-01 NOTE — PLAN OF CARE
Goal Outcome Evaluation:    Patient complains of not being able to move left foot/leg today. States she hit her leg on the wheelchair yesterday while transferring from the bed. Dr. Galvez aware; see orders. Continue to monitor and follow plan of care.

## 2022-09-01 NOTE — PROGRESS NOTES
Occupational Therapy: Individual: 90 minutes.    Physical Therapy:    Speech Language Pathology:    Signed by: Karen Lombardi OT

## 2022-09-01 NOTE — THERAPY TREATMENT NOTE
Inpatient Rehabilitation - Physical Therapy Treatment Note       [facility]     Patient Name: Eloina Batista  : 1940  MRN: [id number]    Today's Date: 2022                    Admit Date: 2022      Visit Dx:   No diagnosis found.    Patient Active Problem List   Diagnosis   • Chest pain   • CAD (coronary artery disease)   • Essential hypertension   • Hyperlipidemia   • Sebaceous cyst   • Left displaced femoral neck fracture (HCC)   • Fall, initial encounter   • Atrial fibrillation with rapid ventricular response (HCC)   • Stage 3a chronic kidney disease (HCC)   • S/p left hip fracture       Past Medical History:   Diagnosis Date   • Arthritis    • Coronary artery disease    • GERD (gastroesophageal reflux disease)    • Hyperlipidemia    • Hypertension        Past Surgical History:   Procedure Laterality Date   • APPENDECTOMY     • BLADDER REPAIR     • CARDIAC CATHETERIZATION     • CARDIAC CATHETERIZATION N/A 3/22/2017    Procedure: Left Heart Cath;  Surgeon: Alex Blanco MD;  Location: [facility] INVASIVE LOCATION;  Service:    • CARDIAC SURGERY     • CHOLECYSTECTOMY     • CORONARY ARTERY BYPASS GRAFT     • CORONARY STENT PLACEMENT     • HERNIA REPAIR     • HIP HEMIARTHROPLASTY Left 2022    Procedure: HIP HEMIARTHROPLASTY LEFT;  Surgeon: Jeramy Reid Jr., MD;  [facility];  Service: Orthopedics;  Laterality: Left;   • HYSTERECTOMY         PT ASSESSMENT (last 12 hours)     IRF PT Evaluation and Treatment     Row Name 22 1500          PT Time and Intention    Document Type daily treatment  -KM     Mode of Treatment individual therapy;physical therapy  -KM     Row Name 22 1500          General Information    Patient Profile Reviewed yes  -KM     Existing Precautions/Restrictions fall;left;hip, posterior  -KM     Row Name 22 1500          Cognition/Psychosocial    Affect/Mental Status (Cognition) WFL;anxious  -KM     Follows Commands (Cognition) follows one-step  commands;verbal cues/prompting required  -     Row Name 09/01/22 1500          Bed Mobility    Bed Mobility sit-supine  -KM     Sit-Supine [place] (Bed Mobility) minimum assist (75% patient effort);verbal cues  -     Row Name 09/01/22 1500          Transfer Assessment/Treatment    Transfers chair-bed transfer  -     Row Name 09/01/22 1500          Transfers    Chair-Bed [place] (Transfers) contact guard;minimum assist (75% patient effort);verbal cues  -     Row Name 09/01/22 1500          Chair-Bed Transfer    Assistive Device (Chair-Bed Transfers) wheelchair  -     Row Name 09/01/22 1500          Gait/Stairs (Locomotion)    Gait/Stairs Locomotion gait/ambulation independence;distance ambulated  -     [place] Level (Gait) contact guard;verbal cues;nonverbal cues (demo/gesture)  -KM     Distance in Feet (Gait) 3'  from w/c to bed  -     Row Name 09/01/22 1500          Safety Issues, Functional Mobility    Impairments Affecting Function (Mobility) balance;endurance/activity tolerance;pain;postural/trunk control;range of motion (ROM);strength  -     Row Name 09/01/22 1500          Motor Skills    Therapeutic Exercise hip;knee;ankle  -[facility] Name 09/01/22 1500          Hip (Therapeutic Exercise)    Hip AROM (Therapeutic Exercise) bilateral;flexion;aBduction;aDduction  -[facility] Name 09/01/22 1500          Knee (Therapeutic Exercise)    Knee (Therapeutic Exercise) AROM (active range of motion)  -KM     Knee AROM (Therapeutic Exercise) bilateral;flexion;extension  -[facility] Name 09/01/22 1500          Ankle (Therapeutic Exercise)    Ankle (Therapeutic Exercise) AROM (active range of motion)  -KM     Ankle AROM (Therapeutic Exercise) bilateral;dorsiflexion;plantarflexion  -     Row Name 09/01/22 1500          Daily Progress Summary (PT)    Functional Goal Overall Progress (PT) progressing toward functional goals slower than expected  -KM     Daily Progress Summary (PT) Pt. was not  able to tolerate any functional mobility skills throughout session. She experienced consistent pain thorughout session in L hip. She demonstrated ability to perform chair to bed transfers w/ CGA to get into bed to rest. Pt. would continue to benefit from skilled PT services.  -KM     Impairments Still Limiting Function (PT) functional activity tolerance impairment;pain;strength deficit  -KM           User Key  (r) = Recorded By, (t) = Taken By, (c) = Cosigned By    Initials Name Provider Type    Sergey Rajan, PT Physical Therapist              Wound 08/18/22 2300 Left palm Skin Tear (Active)   Dressing Appearance dry;intact 09/01/22 0900   Closure None 09/01/22 0900   Base red;dry 09/01/22 0900   Drainage Amount none 08/31/22 1925   Care, Wound antimicrobial agent applied 09/01/22 0900   Dressing Care dressing changed 09/01/22 0900       Wound Left anterior greater trochanter (Active)   Dressing Appearance dry;intact 09/01/22 0900   Closure Approximated 09/01/22 0900   Base clean;dry;pink;red 09/01/22 0900   Drainage Amount none 09/01/22 0900     Physical Therapy Education                 Title: PT OT SLP Therapies (Done)     Topic: Physical Therapy (Done)     Point: Mobility training (Done)     Learning Progress Summary           Patient Acceptance, E,TB, VU by RF at 8/31/2022 1539      Show all documentation for this point (5)                 Point: Home exercise program (Done)     Learning Progress Summary           Patient Acceptance, E,TB, VU by RF at 8/31/2022 1539      Show all documentation for this point (5)                 Point: Body mechanics (Done)     Learning Progress Summary           Patient Acceptance, E,TB, VU by RF at 8/31/2022 1539      Show all documentation for this point (5)                 Point: Precautions (Done)     Learning Progress Summary           Patient Acceptance, E,TB, VU by RF at 8/31/2022 1539      Show all documentation for this point (5)                             User  Key     Initials Effective Dates Name Provider Type Discipline    RF 06/16/21 -  Keshia Douglass PTA Physical Therapist Assistant PT                PT Recommendation and Plan          Daily Progress Summary (PT)  Functional Goal Overall Progress (PT): progressing toward functional goals slower than expected  Daily Progress Summary (PT): Pt. was not able to tolerate any functional mobility skills throughout session. She experienced consistent pain thorughout session in L hip. She demonstrated ability to perform chair to bed transfers w/ CGA to get into bed to rest. Pt. would continue to benefit from skilled PT services.  Impairments Still Limiting Function (PT): functional activity tolerance impairment, pain, strength deficit               Time Calculation:      PT Charges     Row Name 09/01/22 1515             Time Calculation    Start Time 1410  -KM      Stop Time 1510  -KM      Time Calculation (min) 60 min  -KM      PT Received On 09/01/22  -KM              Time Calculation- PT    Total Timed Code Minutes- PT 60 minute(s)  -KM            User Key  (r) = Recorded By, (t) = Taken By, (c) = Cosigned By    Initials Name Provider Type    KM Sergey Finch, PT Physical Therapist                Therapy Charges for Today     Code Description Service Date Service Provider Modifiers Qty    30505371611 HC PT THER PROC EA 15 MIN 9/1/2022 Sergey Finch, PT GP 2    34179731671 HC PT THERAPEUTIC ACT EA 15 MIN 9/1/2022 Sergey Finch, PT GP 2            PT G-Codes  AM-PAC 6 Clicks Score (PT): 6      Sergey Finch PT  9/1/2022

## 2022-09-02 NOTE — PROGRESS NOTES
Rehabilitation Nursing  Inpatient Rehabilitation Plan of Care Note    Plan of Care  Mobility    Bed/Chair/Wheelchair (Active)  Current Status (8/25/2022 12:00:00 AM): Increase in physical activity  Weekly Goal: Joint mobility maintained  Discharge Goal: Demonstrate use of adaptive equipment.    Safety    Potential for Injury (Active)  Current Status (8/25/2022 12:00:00 AM): No falls this shift  Weekly Goal: No falls this hospital stay  Discharge Goal: [place]enty measures to prevent injury    Signed by: Jeffery Brand RN

## 2022-09-02 NOTE — THERAPY TREATMENT NOTE
Inpatient Rehabilitation - Occupational Therapy Progress Note and Treatment Note     [facility]     Patient Name: Eloina Batista  : 1940  MRN: [id number]    Today's Date: 2022                 Admit Date: 2022       No diagnosis found.    Patient Active Problem List   Diagnosis   • Chest pain   • CAD (coronary artery disease)   • Essential hypertension   • Hyperlipidemia   • Sebaceous cyst   • Left displaced femoral neck fracture (HCC)   • Fall, initial encounter   • Atrial fibrillation with rapid ventricular response (HCC)   • Stage 3a chronic kidney disease (HCC)   • S/p left hip fracture       Past Medical History:   Diagnosis Date   • Arthritis    • Coronary artery disease    • GERD (gastroesophageal reflux disease)    • Hyperlipidemia    • Hypertension        Past Surgical History:   Procedure Laterality Date   • APPENDECTOMY     • BLADDER REPAIR     • CARDIAC CATHETERIZATION     • CARDIAC CATHETERIZATION N/A 3/22/2017    Procedure: Left Heart Cath;  Surgeon: Alex Blanco MD;  Location: [facility] INVASIVE LOCATION;  Service:    • CARDIAC SURGERY     • CHOLECYSTECTOMY     • CORONARY ARTERY BYPASS GRAFT     • CORONARY STENT PLACEMENT     • HERNIA REPAIR     • HIP HEMIARTHROPLASTY Left 2022    Procedure: HIP HEMIARTHROPLASTY LEFT;  Surgeon: Jeramy Reid Jr., MD;  Location: [facility] OR;  Service: Orthopedics;  Laterality: Left;   • HYSTERECTOMY               IRF OT ASSESSMENT FLOWSHEET (last 12 hours)     IRF OT Evaluation and Treatment     Row Name 22 1502          OT Time and Intention    Document Type daily treatment;progress note  -TM     Mode of Treatment occupational therapy  -TM     Patient Effort adequate  -TM     Row Name 22 1502          General Information    General Observations of Patient Pt agreeable for therapy.  -TM     Existing Precautions/Restrictions fall;left;hip, posterior  -TM     Row Name 22 1502          Cognition/Psychosocial    Orientation  Status (Cognition) oriented to;person;place;situation  -TM     Follows Commands (Cognition) follows one-step commands;verbal cues/prompting required  -TM     Row Name 09/02/22 1502          Bathing    Comment (Bathing) modA  -TM     Row Name 09/02/22 1502          Upper Body Dressing    [place] Level (Upper Body Dressing) minimum assist (75% or more patient effort);verbal cues  -TM     Position (Upper Body Dressing) supported sitting  -TM     Row Name 09/02/22 1502          Lower Body Dressing    [place] Level (Lower Body Dressing) maximum assist (25% patient effort);verbal cues  -TM     Assistive Device Use (Lower Body Dressing) reacher;other (see comments)  with assistance  -TM     Position (Lower Body Dressing) supported sitting  -TM     Row Name 09/02/22 1502          Grooming    [place] Level (Grooming) set up  -TM     Position (Grooming) supported sitting  -TM     Row Name 09/02/22 1502          Toileting    [place] Level (Toileting) moderate assist (50% patient effort);verbal cues  -TM     Assistive Device Use (Toileting) raised toilet seat;grab bar/safety frame  -TM     Position (Toileting) supported sitting  -TM     Row Name 09/02/22 1502          Transfers    Bed-Chair [place] (Transfers) minimum assist (75% patient effort);verbal cues  -TM     Chair-Bed [place] (Transfers) minimum assist (75% patient effort)  -TM     Assistive Device (Bed-Chair Transfers) wheelchair  -TM     [place] Level (Toilet Transfer) minimum assist (75% patient effort);verbal cues  -TM     Assistive Device (Toilet Transfer) grab bars/safety frame;raised toilet seat;wheelchair  -TM     Row Name 09/02/22 1502          Chair-Bed Transfer    Assistive Device (Chair-Bed Transfers) wheelchair  -TM     Row Name 09/02/22 1502          Toilet Transfer    Type (Toilet Transfer) stand pivot/stand step  -TM     Row Name 09/02/22 1502          Motor Skills    Motor Skills coordination;functional endurance;motor  control/coordination interventions  -TM     Motor Control/Coordination Interventions therapeutic exercise/ROM;fine motor manipulation/dexterity activities;gross motor coordination activities;other (see comments)  BUE ther ex/act, GMC/FMC  -TM     Row Name 09/02/22 1502          UB Dressing Goal 1 (OT-IRF)    Progress/Outcomes (UB Dressing Goal 1, OT-IRF) goal met  -TM     Row Name 09/02/22 1502          UB Dressing Goal 2 (OT-IRF)    Progress/Outcomes (UB Dressing Goal 2, OT-IRF) goal ongoing  -TM     Row Name 09/02/22 1508 09/02/22 1502       LB Dressing Goal 1 (OT-IRF)    [place] (LB Dressing Goal 1, OT-IRF) moderate assist (50-74% patient effort)  -TM --    Time Frame (LB Dressing Goal 1, OT-IRF) short-term goal (STG)  -TM --    Progress/Outcomes (LB Dressing Goal 1, OT-IRF) -- goal met  -    Row Name 09/02/22 1502          Toileting Goal 1 (OT-IRF)    Progress/Outcomes (Toileting Goal 1, OT-IRF) goal met  -     Row Name 09/02/22 1502          Toileting Goal 2 (OT-IRF)    Progress/Outcomes (Toileting Goal 2, OT-IRF) goal met  -           User Key  (r) = Recorded By, (t) = Taken By, (c) = Cosigned By    Initials Name Effective Dates     Karen Lombardi Caren, OT 06/16/21 -                  Occupational Therapy Education                 Title: PT OT SLP Therapies (Done)     Topic: Occupational Therapy (Done)     Point: ADL training (Done)     Description:   Instruct learner(s) on proper safety adaptation and remediation techniques during self care or transfers.   Instruct in proper use of assistive devices.              Learning Progress Summary           Patient Acceptance, E,D, VU,NR by TM at 9/2/2022 1502      Show all documentation for this point (6)                 Point: Precautions (Done)     Description:   Instruct learner(s) on prescribed precautions during self-care and functional transfers.              Learning Progress Summary           Patient Acceptance, E,D, VU,NR by  at 9/2/2022 1502       Show all documentation for this point (6)                             User Key     Initials Effective Dates Name Provider Type Discipline    TM 06/16/21 -  Karen Lombardi OT Occupational Therapist OT                    OT Recommendation and Plan    Planned Therapy Interventions (OT): activity tolerance training, BADL retraining, adaptive equipment training, IADL retraining, occupation/activity based interventions, patient/caregiver education/training, ROM/therapeutic exercise, strengthening exercise, transfer/mobility retraining                    Time Calculation:      Time Calculation- OT     Row Name 09/02/22 1501             Time Calculation- OT    OT Start Time 1230  -TM      OT Stop Time 1400  -TM      OT Time Calculation (min) 90 min  -TM      Total Timed Code Minutes- OT 90 minute(s)  -TM      OT Non-Billable Time (min) 15 min  -TM            User Key  (r) = Recorded By, (t) = Taken By, (c) = Cosigned By    Initials Name Provider Type    TM Karen Lombardi OT Occupational Therapist              Therapy Charges for Today     Code Description Service Date Service Provider Modifiers Qty    20770471543 HC OT SELF CARE/MGMT/TRAIN EA 15 MIN 9/1/2022 Karen Lombardi, OT GO 2    85717847861 HC OT THERAPEUTIC ACT EA 15 MIN 9/1/2022 Karen Lombardi, OT GO 3    34957835150 HC OT THER PROC EA 15 MIN 9/1/2022 Karen Lombardi OT GO 1    51223706924 HC OT SELF CARE/MGMT/TRAIN EA 15 MIN 9/2/2022 Karen Lombardi, OT GO 3    41001793149 HC OT THERAPEUTIC ACT EA 15 MIN 9/2/2022 Karen Lombardi OT GO 3                   Karen Lombardi OT  9/2/2022

## 2022-09-02 NOTE — PROGRESS NOTES
Occupational Therapy:    Physical Therapy: Individual: 90 minutes.    Speech Language Pathology:    Signed by: Keshia Douglass PTA

## 2022-09-02 NOTE — PLAN OF CARE
Goal Outcome Evaluation:  Plan of Care Reviewed With: patient        Progress: improving  Outcome Evaluation: Patient's leg feeling better today; able to move again and bare weight. Progressing with therapies. Continue with plan of care.

## 2022-09-02 NOTE — THERAPY PROGRESS REPORT/RE-CERT
Inpatient Rehabilitation - Physical Therapy Progress Note       [facility]     Patient Name: Eloina Batista  : 1940  MRN: [id number]    Today's Date: 2022                    Admit Date: 2022      Visit Dx:   No diagnosis found.    Patient Active Problem List   Diagnosis   • Chest pain   • CAD (coronary artery disease)   • Essential hypertension   • Hyperlipidemia   • Sebaceous cyst   • Left displaced femoral neck fracture (HCC)   • Fall, initial encounter   • Atrial fibrillation with rapid ventricular response (HCC)   • Stage 3a chronic kidney disease (HCC)   • S/p left hip fracture       Past Medical History:   Diagnosis Date   • Arthritis    • Coronary artery disease    • GERD (gastroesophageal reflux disease)    • Hyperlipidemia    • Hypertension        Past Surgical History:   Procedure Laterality Date   • APPENDECTOMY     • BLADDER REPAIR     • CARDIAC CATHETERIZATION     • CARDIAC CATHETERIZATION N/A 3/22/2017    Procedure: Left Heart Cath;  Surgeon: Alex Blanco MD;  Location: [facility] INVASIVE LOCATION;  Service:    • CARDIAC SURGERY     • CHOLECYSTECTOMY     • CORONARY ARTERY BYPASS GRAFT     • CORONARY STENT PLACEMENT     • HERNIA REPAIR     • HIP HEMIARTHROPLASTY Left 2022    Procedure: HIP HEMIARTHROPLASTY LEFT;  Surgeon: Jeramy Reid Jr., MD;  Location: [facility] OR;  Service: Orthopedics;  Laterality: Left;   • HYSTERECTOMY         PT ASSESSMENT (last 12 hours)     IRF PT Evaluation and Treatment     Row Name 22 1530          PT Time and Intention    Document Type progress note;daily treatment  -RF     Mode of Treatment individual therapy;physical therapy  -RF     Patient/Family/Caregiver Comments/Observations Pt c/o fatigue and increased pain with activity. Pt c/o intra session of blurred vision and dizziness. ARISTEO brush.  -RF     Row Name 22 7931          General Information    Patient Profile Reviewed yes  -RF     Existing Precautions/Restrictions  fall;left;hip, posterior  -RF     Row Name 09/02/22 1534          Cognition/Psychosocial    Affect/Mental Status (Cognition) WFL;anxious  -RF     Orientation Status (Cognition) oriented to;person;place;situation  -RF     Follows Commands (Cognition) follows one-step commands;verbal cues/prompting required  -RF     Personal Safety Interventions gait belt;nonskid shoes/slippers when out of bed;fall prevention program maintained  -RF     Row Name 09/02/22 1534          Bed Mobility    Bed Mobility sit-supine  -RF     Rolling Left [place] (Bed Mobility) supervision;nonverbal cues (demo/gesture);verbal cues  For bathing and dressing  -RF     Rolling Right [place] (Bed Mobility) supervision;nonverbal cues (demo/gesture);verbal cues  For bathing and dressing  -RF     Sit-Supine [place] (Bed Mobility) minimum assist (75% patient effort);verbal cues  -RF     Assistive Device (Bed Mobility) bed rails  -RF     Comment, (Bed Mobility) Requires assistance with LEs.  -RF     Row Name 09/02/22 1534          Transfer Assessment/Treatment    Transfers chair-bed transfer  -RF     Row Name 09/02/22 1534          Transfers    Bed-Chair [place] (Transfers) minimum assist (75% patient effort);contact guard  -RF     Chair-Bed [place] (Transfers) contact guard;minimum assist (75% patient effort);verbal cues  -RF     Assistive Device (Bed-Chair Transfers) wheelchair  -RF     Sit-Stand [place] (Transfers) contact guard;minimum assist (75% patient effort)  -RF     Stand-Sit [place] (Transfers) contact guard;minimum assist (75% patient effort)  -RF     Row Name 09/02/22 1534          Chair-Bed Transfer    Assistive Device (Chair-Bed Transfers) wheelchair  -RF     Row Name 09/02/22 1534          Sit-Stand Transfer    Assistive Device (Sit-Stand Transfers) wheelchair  -RF     Row Name 09/02/22 1534          Stand-Sit Transfer    Assistive Device (Stand-Sit Transfers) wheelchair  -     Row Name 09/02/22 1534           Gait/Stairs (Locomotion)    Comment, (Gait/Stairs) Unable due to hypotension and related symptoms.  -RF     Row Name 09/02/22 1534          Safety Issues, Functional Mobility    Impairments Affecting Function (Mobility) balance;endurance/activity tolerance;pain;postural/trunk control;range of motion (ROM);strength  -     Row Name 09/02/22 1534          Balance    Dynamic Sitting Balance supervision  unsupported sit EOB for dressing; no LOB noted.  -     Row Name 09/02/22 1534          Hip (Therapeutic Exercise)    Hip Strengthening (Therapeutic Exercise) bilateral;flexion;extension;aBduction;aDduction;heel slides;marching while seated;sitting;resistance band;red;2 sets;10 repetitions;other (see comments)  SLE in supine multiple times for bathing and dressing.  -     Row Name 09/02/22 1534          Knee (Therapeutic Exercise)    Knee Strengthening (Therapeutic Exercise) bilateral;flexion;extension;heel slides;marching while seated;LAQ (long arc quad);hamstring curls;sitting;resistance band;red;2 sets;10 repetitions  -     Row Name 09/02/22 1534          Ankle (Therapeutic Exercise)    Ankle Strengthening (Therapeutic Exercise) bilateral;dorsiflexion;plantarflexion;sitting;2 sets;10 repetitions  -     Row Name 09/02/22 1534          Positioning and Restraints    Pre-Treatment Position in bed  -RF     Post Treatment Position bed  -RF     In Bed supine;call light within reach;encouraged to call for assist  -     Row Name 09/02/22 1534          Vital Signs    Pre Systolic BP Rehab 88  while performing sitting TE; c/o pain, dizziness, and blurred vision noted.  -RF     Pre Treatment Diastolic BP 61  -RF     Intra Systolic BP Rehab 95  abdominal binder donned; pt sitting at rest  -RF     Intra Treatment Diastolic BP 67  -RF     Post Systolic BP Rehab 126  supine  -RF     Post Treatment Diastolic BP 80  -RF     Row Name 09/02/22 1534          Daily Progress Summary (PT)    Daily Progress Summary (PT)  Decreased tolerance observed this date as patient demonstrated symptoms associated with low BP wit exertion and pain. Improving hip strength noted with sitting TE, however. Continued skilled care required for further LE strengthening and gait training to ensure maximum safe function upon D/C.  -RF     Impairments Still Limiting Function (PT) functional activity tolerance impairment;pain;strength deficit  -RF     Recommendations (PT) Continue per current POC  -RF     Row Name 09/02/22 1534          Bed Mobility Goal 1 (PT-IRF)    Progress/Outcomes (Bed Mobility Goal 1, PT-IRF) goal met  -RF     Row Name 09/02/22 1534          Bed Mobility Goal 2 (PT-IRF)    Progress/Outcomes (Bed Mobility Goal 2, PT-IRF) goal ongoing  -RF     Row Name 09/02/22 1534          Transfer Goal 1 (PT-IRF)    Progress/Outcomes (Transfer Goal 1, PT-IRF) goal met  -RF     Row Name 09/02/22 1534          Transfer Goal 2 (PT-IRF)    Progress/Outcomes (Transfer Goal 2, PT-IRF) goal ongoing  -RF     Row Name 09/02/22 1534          Gait/Walking Locomotion Goal 1 (PT-IRF)    Progress/Outcomes (Gait/Walking Locomotion Goal 1, PT-IRF) goal ongoing  -RF     Row Name 09/02/22 1534          Gait/Walking Locomotion Goal 2 (PT-IRF)    Progress/Outcomes (Gait/Walking Locomotion Goal 2, PT-IRF) goal ongoing  -RF           User Key  (r) = Recorded By, (t) = Taken By, (c) = Cosigned By    Initials Name Provider Type    RF Keshia Douglass, PTA Physical Therapist Assistant              Wound 08/18/22 2300 Left palm Skin Tear (Active)   Dressing Appearance dry;intact 09/02/22 0836   Closure None 09/02/22 0836   Base red;dry 09/02/22 0836   Drainage Amount none 09/01/22 1925   Care, Wound antimicrobial agent applied 09/02/22 0836   Dressing Care dressing changed 09/02/22 0836       Wound Left anterior greater trochanter (Active)   Dressing Appearance dry;intact 09/02/22 0836   Closure Approximated 09/02/22 0836   Base clean;dry;pink;red 09/02/22 0836   Drainage  Amount none 09/02/22 0836     Physical Therapy Education                 Title: PT OT SLP Therapies (Done)     Topic: Physical Therapy (Done)     Point: Mobility training (Done)     Learning Progress Summary           Patient Acceptance, E,TB, VU by RF at 9/2/2022 1540      Show all documentation for this point (6)                 Point: Home exercise program (Done)     Learning Progress Summary           Patient Acceptance, E,TB, VU by RF at 9/2/2022 1540      Show all documentation for this point (6)                 Point: Body mechanics (Done)     Learning Progress Summary           Patient Acceptance, E,TB, VU by RF at 9/2/2022 1540      Show all documentation for this point (6)                 Point: Precautions (Done)     Learning Progress Summary           Patient Acceptance, E,TB, VU by RF at 9/2/2022 1540      Show all documentation for this point (6)                             User Key     Initials Effective Dates Name Provider Type Discipline     06/16/21 -  Keshia Douglass, PTA Physical Therapist Assistant PT                PT Recommendation and Plan          Daily Progress Summary (PT)  Functional Goal Overall Progress (PT): progressing toward functional goals as expected  Daily Progress Summary (PT): Decreased tolerance observed this date as patient demonstrated symptoms associated with low BP wit exertion and pain. Improving hip strength noted with sitting TE, however. Continued skilled care required for further LE strengthening and gait training to ensure maximum safe function upon D/C.  Impairments Still Limiting Function (PT): functional activity tolerance impairment, pain, strength deficit  Recommendations (PT): Continue per current POC               Time Calculation:      PT Charges     Row Name 09/02/22 1540             Time Calculation    Start Time 0915  -RF      Stop Time 1040  -RF      Time Calculation (min) 85 min  -RF      PT Received On 09/02/22  -RF      PT - Next Appointment  09/03/22  -RF      PT Goal Re-Cert Due Date 09/09/22  -RF              Time Calculation- PT    Total Timed Code Minutes- PT 85 minute(s)  -RF            User Key  (r) = Recorded By, (t) = Taken By, (c) = Cosigned By    Initials Name Provider Type    RF Keshia Douglass PTA Physical Therapist Assistant                Therapy Charges for Today     Code Description Service Date Service Provider Modifiers Qty    42800000330 HC PT THER PROC EA 15 MIN 9/2/2022 Keshia Douglass PTA GP, CQ 3    24930735599 HC PT THERAPEUTIC ACT EA 15 MIN 9/2/2022 Keshia Douglass PTA GP, CQ 3            PT G-Codes  AM-PAC 6 Clicks Score (PT): 6      Keshia Douglass PTA  9/2/2022

## 2022-09-02 NOTE — PROGRESS NOTES
CC: Follow-up on left hip fracture    Interview History/HPI: Patient is still having moderate pain in her left hip post ORIF for fractured hip, obviously worse when she is up on it..  No fever or chills.  But states the leg feels better, she is actually able to lift this off the bed for me today.  Denies chest pain shortness of breath or cough.          Current Hospital Meds:  apixaban, 5 mg, Oral, Q12H  atorvastatin, 80 mg, Oral, Nightly  calcium carbonate (oyster shell), 500 mg, Oral, Daily  cetirizine, 10 mg, Oral, Daily  clopidogrel, 75 mg, Oral, Daily  estrogens (conjugated), 0.3 mg, Oral, Daily  famotidine, 20 mg, Oral, BID AC  metoprolol succinate XL, 125 mg, Oral, Q24H  multivitamin, 1 tablet, Oral, Daily  neomycin-bacitracin-polymyxin, 1 application, Topical, Q12H  polyethylene glycol, 17 g, Oral, Daily  valsartan, 80 mg, Oral, Q24H  vitamin B-12, 1,000 mcg, Oral, Daily  vitamin B-6, 25 mg, Oral, TID  vitamin D3, 5,000 Units, Oral, Daily  vitamin E, 400 Units, Oral, BID         Vitals:    09/02/22 0735   BP: 121/74   Pulse: 91   Resp: 18   Temp: 98.4 °F (36.9 °C)   SpO2: 98%         Intake/Output Summary (Last 24 hours) at 9/2/2022 1455  Last data filed at 9/2/2022 1300  Gross per 24 hour   Intake 1080 ml   Output 700 ml   Net 380 ml       EXAM: She can lift her left leg off the bed about 5 to 6 inches.  Mood is good skin warm dry lungs have bilateral breath sounds no rhonchi rales wheezing heard today, heart is a mild tachycardic irregular regular rhythm without murmur rub or gallop, abdomen soft benign bowel sounds are active.      Diet Regular        LABS:     Lab Results (last 48 hours)     Procedure Component Value Units Date/Time    Comprehensive Metabolic Panel [[id number]]  (Abnormal) Collected: 09/02/22 0728    Specimen: Blood Updated: 09/02/22 7957     Glucose 119 mg/dL      BUN 11 mg/dL      Creatinine 0.96 mg/dL      Sodium 137 mmol/L      Potassium 4.2 mmol/L      Chloride 103 mmol/L       CO2 23.5 mmol/L      Calcium 9.4 mg/dL      Total Protein 6.0 g/dL      Albumin 2.61 g/dL      ALT (SGPT) 24 U/L      AST (SGOT) 36 U/L      Alkaline Phosphatase 107 U/L      Total Bilirubin 1.3 mg/dL      Globulin 3.4 gm/dL      A/G Ratio 0.8 g/dL      BUN/Creatinine Ratio 11.5     Anion Gap 10.5 mmol/L      eGFR 59.6 mL/min/1.73      Comment: National Kidney Foundation and American Society of Nephrology (ASN) Task Force recommended calculation based on the Chronic Kidney Disease Epidemiology Collaboration (CKD-EPI) equation refit without adjustment for race.       Narrative:      GFR Normal >60  Chronic Kidney Disease <60  Kidney Failure <15      CK [[id number]]  (Normal) Collected: 09/02/22 0728    Specimen: Blood Updated: 09/02/22 0759     Creatine Kinase 43 U/L     CBC & Differential [[id number]]  (Abnormal) Collected: 09/02/22 0728    Specimen: Blood Updated: 09/02/22 0739    Narrative:      The following orders were created for panel order CBC & Differential.  Procedure                               Abnormality         Status                     ---------                               -----------         ------                     CBC Auto Differential[[id number]]        Abnormal            Final result                 Please view results for these tests on the individual orders.    CBC Auto Differential [[id number]]  (Abnormal) Collected: 09/02/22 0728    Specimen: Blood Updated: 09/02/22 0739     WBC 9.64 10*3/mm3      RBC 2.99 10*6/mm3      Hemoglobin 9.4 g/dL      Hematocrit 30.7 %      .7 fL      MCH 31.4 pg      MCHC 30.6 g/dL      RDW 17.0 %      RDW-SD 62.3 fl      MPV 9.2 fL      Platelets 286 10*3/mm3      Neutrophil % 79.1 %      Lymphocyte % 11.2 %      Monocyte % 6.4 %      Eosinophil % 2.3 %      Basophil % 0.4 %      Immature Grans % 0.6 %      Neutrophils, Absolute 7.62 10*3/mm3      Lymphocytes, Absolute 1.08 10*3/mm3      Monocytes, Absolute 0.62 10*3/mm3      Eosinophils, Absolute 0.22  10*3/mm3      Basophils, Absolute 0.04 10*3/mm3      Immature Grans, Absolute 0.06 10*3/mm3      nRBC 0.0 /100 WBC                Radiology:    Imaging Results (Last 72 Hours)     Procedure Component Value Units Date/Time    XR Hip With or Without Pelvis 2 - 3 View Left [[id number]] Collected: 09/01/22 1141     Updated: 09/01/22 1143    Narrative:      EXAM:    XR Left Hip With Pelvis When Performed, 2 or 3 Views     EXAM DATE:    9/1/2022 10:26 AM     CLINICAL HISTORY:    poor mobility post fracture repair     TECHNIQUE:    Two or three views of the left hip with pelvis when performed.     COMPARISON:    No relevant prior studies available.     FINDINGS:    Bones/joints:  Left hip prosthesis. Alignment anatomic.  No acute  fracture.    Soft tissues:  Unremarkable.       Impression:        Left hip prosthesis. Alignment anatomic.     This report was finalized on 9/1/2022 11:41 AM by Dr. Joshua Chaudhry MD.             Results for orders placed during the hospital encounter of 08/18/22    Adult Transthoracic Echo Complete W/ Cont if Necessary Per Protocol    Interpretation Summary  · Estimated left ventricular EF = 66% Left ventricular ejection fraction appears to be 66 - 70%. Left ventricular systolic function is hyperdynamic (EF > 70%).  · Mild to moderate aortic valve regurgitation is present.  · Left ventricular wall thickness is consistent with hypertrophy.  · Left ventricular diastolic function was indeterminate.  · There is calcification of the aortic valve.  · Estimated right ventricular systolic pressure from tricuspid regurgitation is normal (<35 mmHg). Calculated right ventricular systolic pressure from tricuspid regurgitation is 33 mmHg.      Assessment/Plan:   Left hip fracture, patient is participating with therapy, continue OT and PT. Patient is progressing towards functional goals slower than expected.    Atrial fibrillation with RVR, on Eliquis for stroke prevention which also serves for DVT  prophylaxis, Toprol was increased first dose of the increased this morning, continue to monitor closely especially with therapy.    Coronary artery disease she is status post bypass in the past with PCI as well post bypass.  This was remote, patient continues on Plavix and statin.    Acute blood loss anemia, now stable.    Clinically insignificant hyponatremia, stable    Macrocytosis with macrocytic anemia, will check B12 and folate in the a.m.    History of hypertension, in order to increase her Toprol I decreased her Diovan, blood pressure appears good at this time, follow    Kalen Galvez MD

## 2022-09-03 NOTE — PROGRESS NOTES
Rehabilitation Nursing  Inpatient Rehabilitation Plan of Care Note    Plan of Care  Copy from [facility]    Bed/Chair/Wheelchair (Active)  Current Status (8/25/2022 12:00:00 AM): Increase in physical activity  Weekly Goal: Joint mobility maintained  Discharge Goal: Demonstrate use of adaptive equipment.    Safety    Potential for Injury (Active)  Current Status (8/25/2022 12:00:00 AM): No falls this shift  Weekly Goal: No falls this hospital stay  Discharge Goal: Identy measures to prevent injury    Signed by: Echo Alvarez Nurse

## 2022-09-03 NOTE — THERAPY TREATMENT NOTE
Inpatient Rehabilitation - Physical Therapy Treatment Note       [facility]     Patient Name: Eloina Batista  : 1940  MRN: [id number]    Today's Date: 9/3/2022                    Admit Date: 2022      Visit Dx:   No diagnosis found.    Patient Active Problem List   Diagnosis   • Chest pain   • CAD (coronary artery disease)   • Essential hypertension   • Hyperlipidemia   • Sebaceous cyst   • Left displaced femoral neck fracture (HCC)   • Fall, initial encounter   • Atrial fibrillation with rapid ventricular response (HCC)   • Stage 3a chronic kidney disease (HCC)   • S/p left hip fracture       Past Medical History:   Diagnosis Date   • Arthritis    • Coronary artery disease    • GERD (gastroesophageal reflux disease)    • Hyperlipidemia    • Hypertension        Past Surgical History:   Procedure Laterality Date   • APPENDECTOMY     • BLADDER REPAIR     • CARDIAC CATHETERIZATION     • CARDIAC CATHETERIZATION N/A 3/22/2017    Procedure: Left Heart Cath;  Surgeon: Alex Blanco MD;  Location: [facility] INVASIVE LOCATION;  Service:    • CARDIAC SURGERY     • CHOLECYSTECTOMY     • CORONARY ARTERY BYPASS GRAFT     • CORONARY STENT PLACEMENT     • HERNIA REPAIR     • HIP HEMIARTHROPLASTY Left 2022    Procedure: HIP HEMIARTHROPLASTY LEFT;  Surgeon: Jeramy Reid Jr., MD;  Location: [facility] OR;  Service: Orthopedics;  Laterality: Left;   • HYSTERECTOMY         PT ASSESSMENT (last 12 hours)     IRF PT Evaluation and Treatment     Row Name 22 1108          PT Time and Intention    Document Type daily treatment  -CT     Mode of Treatment individual therapy;physical therapy  -CT     Patient/Family/Caregiver Comments/Observations Pt c/o dizziness with walking  -CT     Row Name 22 1101          General Information    Patient Profile Reviewed yes  -CT     Existing Precautions/Restrictions fall;left;hip, posterior  -CT     Row Name 22 1109          Cognition/Psychosocial    Affect/Mental  Status (Cognition) WFL;anxious  -CT     Orientation Status (Cognition) oriented to;person;place;situation  -CT     Follows Commands (Cognition) follows one-step commands;verbal cues/prompting required  -CT     Row Name 09/03/22 1108          Bed Mobility    Bed Mobility sit-supine  -CT     Rolling Left Howard (Bed Mobility) supervision;nonverbal cues (demo/gesture);verbal cues  For bathing and dressing  -CT     Rolling Right Howard (Bed Mobility) supervision;nonverbal cues (demo/gesture);verbal cues  For bathing and dressing  -CT     Sit-Supine Howard (Bed Mobility) minimum assist (75% patient effort);verbal cues  -CT     Assistive Device (Bed Mobility) bed rails  -CT     Row Name 09/03/22 1108          Transfer Assessment/Treatment    Transfers chair-bed transfer  -CT     Row Name 09/03/22 1108          Transfers    Bed-Chair Howard (Transfers) minimum assist (75% patient effort);contact guard  -CT     Chair-Bed Howard (Transfers) contact guard;minimum assist (75% patient effort);verbal cues  -CT     Assistive Device (Bed-Chair Transfers) wheelchair  -CT     Sit-Stand Howard (Transfers) contact guard;minimum assist (75% patient effort)  -CT     Stand-Sit Howard (Transfers) contact guard;minimum assist (75% patient effort)  -CT     Row Name 09/03/22 1108          Chair-Bed Transfer    Assistive Device (Chair-Bed Transfers) wheelchair  -CT     Row Name 09/03/22 1108          Sit-Stand Transfer    Assistive Device (Sit-Stand Transfers) wheelchair  -CT     Row Name 09/03/22 1108          Stand-Sit Transfer    Assistive Device (Stand-Sit Transfers) wheelchair  -CT     Row Name 09/03/22 1108          Gait/Stairs (Locomotion)    Howard Level (Gait) contact guard;verbal cues;nonverbal cues (demo/gesture)  -CT     Assistive Device (Gait) walker, front-wheeled  -CT     Distance in Feet (Gait) 8  -CT     Pattern (Gait) step-to  -CT     Deviations/Abnormal Patterns (Gait)  antalgic;ifeanyi decreased;gait speed decreased;stride length decreased;weight shifting decreased  -CT     Bilateral Gait Deviations forward flexed posture  -CT     Row Name 09/03/22 1108          Safety Issues, Functional Mobility    Impairments Affecting Function (Mobility) balance;endurance/activity tolerance;pain;postural/trunk control;range of motion (ROM);strength  -CT     Row Name 09/03/22 1108          Motor Skills    Therapeutic Exercise --  BLE sitting ther ex  -CT     Row Name 09/03/22 1108          Positioning and Restraints    Pre-Treatment Position in bed  -CT     Post Treatment Position wheelchair  -CT     In Wheelchair sitting;call light within reach;encouraged to call for assist  -CT     Row Name 09/03/22 1108          Daily Progress Summary (PT)    Impairments Still Limiting Function (PT) functional activity tolerance impairment;pain;strength deficit  -CT           User Key  (r) = Recorded By, (t) = Taken By, (c) = Cosigned By    Initials Name Provider Type    CT Kallie Burnett, PT Physical Therapist              Wound 08/18/22 2300 Left palm Skin Tear (Active)   Dressing Appearance dry;intact 09/02/22 1925   Closure None 09/02/22 1925   Base clean;dry 09/02/22 1925   Drainage Amount none 09/02/22 1925   Care, Wound antimicrobial agent applied 09/02/22 1925   Dressing Care dressing changed 09/02/22 1925       Wound Left anterior greater trochanter (Active)   Dressing Appearance dry;intact 09/02/22 1925     Physical Therapy Education                 Title: PT OT SLP Therapies (Done)     Topic: Physical Therapy (Done)     Point: Mobility training (Done)     Learning Progress Summary           Patient Acceptance, E,TB, VU by RF at 9/2/2022 1540      Show all documentation for this point (6)                 Point: Home exercise program (Done)     Learning Progress Summary           Patient Acceptance, E,TB, VU by RF at 9/2/2022 1540      Show all documentation for this point (6)                 Point:  Body mechanics (Done)     Learning Progress Summary           Patient Acceptance, E,TB, VU by RF at 9/2/2022 1540      Show all documentation for this point (6)                 Point: Precautions (Done)     Learning Progress Summary           Patient Acceptance, E,TB, VU by RF at 9/2/2022 1540      Show all documentation for this point (6)                             User Key     Initials Effective Dates Name Provider Type [facility] 06/16/21 -  Keshia Douglass PTA Physical Therapist Assistant PT                PT Recommendation and Plan          Daily Progress Summary (PT)  Impairments Still Limiting Function (PT): functional activity tolerance impairment, pain, strength deficit               Time Calculation:      PT Charges     Row Name 09/03/22 1110             Time Calculation    Start Time 0745  -CT      Stop Time 0825  -CT      Time Calculation (min) 40 min  -CT      PT Received On 09/03/22  -CT            User Key  (r) = Recorded By, (t) = Taken By, (c) = Cosigned By    Initials Name Provider Type    CT Kallie Burnett, PT Physical Therapist                Therapy Charges for Today     Code Description Service Date Service Provider Modifiers Qty    47914783505 HC GAIT TRAINING EA 15 MIN 9/3/2022 Kallie Burnett, PT GP 1    78211667139 HC PT THERAPEUTIC ACT EA 15 MIN 9/3/2022 Kallie Burnett, PT GP 1    14680217295 HC PT THER PROC EA 15 MIN 9/3/2022 Kallie Burnett, PT GP 1            PT G-Codes  AM-PAC 6 Clicks Score (PT): 6      Kallie Burnett PT  9/3/2022

## 2022-09-03 NOTE — PROGRESS NOTES
"Assisted By: RN assiting\"B\" bed    CC: Follow-up on left hip fracture.    Interview History/HPI: Patient states she is doing well, she was having some increasing pain in her left hip around the incision however after being given Flexeril last night with her other analgesia, she did fall asleep and slept well through the rest of the night.  She states she has tolerated her diet.  I have not seen their note as of yet however patient states she walked 15 feet with a walker with therapy this morning.  Overall she feels like she is improving.  Denies chest pain or shortness of breath.          Current Hospital Meds:  apixaban, 5 mg, Oral, Q12H  atorvastatin, 80 mg, Oral, Nightly  calcium carbonate (oyster shell), 500 mg, Oral, Daily  cetirizine, 10 mg, Oral, Daily  clopidogrel, 75 mg, Oral, Daily  estrogens (conjugated), 0.3 mg, Oral, Daily  famotidine, 20 mg, Oral, BID AC  metoprolol succinate XL, 125 mg, Oral, Q24H  multivitamin, 1 tablet, Oral, Daily  neomycin-bacitracin-polymyxin, 1 application, Topical, Q12H  polyethylene glycol, 17 g, Oral, Daily  valsartan, 80 mg, Oral, Q24H  vitamin B-12, 1,000 mcg, Oral, Daily  vitamin B-6, 25 mg, Oral, TID  vitamin D3, 5,000 Units, Oral, Daily  vitamin E, 400 Units, Oral, BID         Vitals:    09/03/22 0730   BP: 115/76   Pulse: 99   Resp: 16   Temp: 98.1 °F (36.7 °C)   SpO2: 93%         Intake/Output Summary (Last 24 hours) at 9/3/2022 1040  Last data filed at 9/3/2022 0445  Gross per 24 hour   Intake 720 ml   Output 650 ml   Net 70 ml       EXAM: Pleasant and in no distress, sleeping on arrival easily awakened, she can still lift her left leg off the bed.  She moves her toes well.  Sensation intact in the foot, lungs are clear, heart is irregular irregular controlled rhythm in the 80s currently.      Diet Regular        LABS:     Lab Results (last 48 hours)     Procedure Component Value Units Date/Time    TSH [[id number]]  (Normal) Collected: 09/03/22 0530    Specimen: Blood " Updated: 09/03/22 0614     TSH 2.170 uIU/mL     Vitamin B12 [[id number]] Collected: 09/03/22 0530    Specimen: Blood Updated: 09/03/22 0543    Folate [[id number]] Collected: 09/03/22 0530    Specimen: Blood Updated: 09/03/22 0543    Comprehensive Metabolic Panel [[id number]]  (Abnormal) Collected: 09/02/22 0728    Specimen: Blood Updated: 09/02/22 0759     Glucose 119 mg/dL      BUN 11 mg/dL      Creatinine 0.96 mg/dL      Sodium 137 mmol/L      Potassium 4.2 mmol/L      Chloride 103 mmol/L      CO2 23.5 mmol/L      Calcium 9.4 mg/dL      Total Protein 6.0 g/dL      Albumin 2.61 g/dL      ALT (SGPT) 24 U/L      AST (SGOT) 36 U/L      Alkaline Phosphatase 107 U/L      Total Bilirubin 1.3 mg/dL      Globulin 3.4 gm/dL      A/G Ratio 0.8 g/dL      BUN/Creatinine Ratio 11.5     Anion Gap 10.5 mmol/L      eGFR 59.6 mL/min/1.73      Comment: National Kidney Foundation and American Society of Nephrology (ASN) Task Force recommended calculation based on the Chronic Kidney Disease Epidemiology Collaboration (CKD-EPI) equation refit without adjustment for race.       Narrative:      GFR Normal >60  Chronic Kidney Disease <60  Kidney Failure <15      CK [[id number]]  (Normal) Collected: 09/02/22 0728    Specimen: Blood Updated: 09/02/22 0759     Creatine Kinase 43 U/L     CBC & Differential [[id number]]  (Abnormal) Collected: 09/02/22 0728    Specimen: Blood Updated: 09/02/22 0739    Narrative:      The following orders were created for panel order CBC & Differential.  Procedure                               Abnormality         Status                     ---------                               -----------         ------                     CBC Auto Differential[[id number]]        Abnormal            Final result                 Please view results for these tests on the individual orders.    CBC Auto Differential [[id number]]  (Abnormal) Collected: 09/02/22 0728    Specimen: Blood Updated: 09/02/22 0739     WBC 9.64 10*3/mm3      RBC  2.99 10*6/mm3      Hemoglobin 9.4 g/dL      Hematocrit 30.7 %      .7 fL      MCH 31.4 pg      MCHC 30.6 g/dL      RDW 17.0 %      RDW-SD 62.3 fl      MPV 9.2 fL      Platelets 286 10*3/mm3      Neutrophil % 79.1 %      Lymphocyte % 11.2 %      Monocyte % 6.4 %      Eosinophil % 2.3 %      Basophil % 0.4 %      Immature Grans % 0.6 %      Neutrophils, Absolute 7.62 10*3/mm3      Lymphocytes, Absolute 1.08 10*3/mm3      Monocytes, Absolute 0.62 10*3/mm3      Eosinophils, Absolute 0.22 10*3/mm3      Basophils, Absolute 0.04 10*3/mm3      Immature Grans, Absolute 0.06 10*3/mm3      nRBC 0.0 /100 WBC                Radiology:    Imaging Results (Last 72 Hours)     Procedure Component Value Units Date/Time    XR Hip With or Without Pelvis 2 - 3 View Left [[id number]] Collected: 09/01/22 1141     Updated: 09/01/22 1143    Narrative:      EXAM:    XR Left Hip With Pelvis When Performed, 2 or 3 Views     EXAM DATE:    9/1/2022 10:26 AM     CLINICAL HISTORY:    poor mobility post fracture repair     TECHNIQUE:    Two or three views of the left hip with pelvis when performed.     COMPARISON:    No relevant prior studies available.     FINDINGS:    Bones/joints:  Left hip prosthesis. Alignment anatomic.  No acute  fracture.    Soft tissues:  Unremarkable.       Impression:        Left hip prosthesis. Alignment anatomic.     This report was finalized on 9/1/2022 11:41 AM by Dr. Joshua Chaudhry MD.             Results for orders placed during the hospital encounter of 08/18/22    Adult Transthoracic Echo Complete W/ Cont if Necessary Per Protocol    Interpretation Summary  · Estimated left ventricular EF = 66% Left ventricular ejection fraction appears to be 66 - 70%. Left ventricular systolic function is hyperdynamic (EF > 70%).  · Mild to moderate aortic valve regurgitation is present.  · Left ventricular wall thickness is consistent with hypertrophy.  · Left ventricular diastolic function was indeterminate.  · There is  calcification of the aortic valve.  · Estimated right ventricular systolic pressure from tricuspid regurgitation is normal (<35 mmHg). Calculated right ventricular systolic pressure from tricuspid regurgitation is 33 mmHg.      Assessment/Plan:   Left hip fracture, patient is participating with therapy and day by day improving, continue OT and PT.      Atrial fibrillation with RVR, on Eliquis for stroke prevention which also serves for DVT prophylaxis, Toprol was increased due to poor rate control and to be able to tolerate this I did decrease her Diovan.  Blood pressure and pulse are more acceptable with these changes.     Coronary artery disease she is status post bypass in the past with PCI as well post bypass.  This was remote, patient continues on Plavix and statin.     Acute blood loss anemia, now stable.     Clinically insignificant hyponatremia, stable     Macrocytic anemia, B12 and folate are pending.  TSH normal     History of hypertension, stable with the above medication adjustments I had made earlier.    DVT prophylaxis, Eliquis will serve for this as well    No bowel movement listed in the last 36 hours, will continue to monitor consider additional cathartic tomorrow if no BM overnight.  Patient is on MiraLAX.    Kalen Galvez MD

## 2022-09-03 NOTE — PLAN OF CARE
Goal Outcome Evaluation:   Continues to progress medically and physically with all therapies.  Continue current POA.

## 2022-09-03 NOTE — PROGRESS NOTES
Occupational Therapy:    Physical Therapy: Individual: 40 minutes.    Speech Language Pathology:    Signed by: Kallie Burnett PT

## 2022-09-04 NOTE — PROGRESS NOTES
Assisted By: Nichelle ALBERTS    CC: Follow-up on fractured left hip    Interview History/HPI: Patient states she had a good night, she had some pain in her hip but overall slept well.  No palpitations no chest pain no shortness of breath.          Current Hospital Meds:  apixaban, 5 mg, Oral, Q12H  atorvastatin, 80 mg, Oral, Nightly  calcium carbonate (oyster shell), 500 mg, Oral, Daily  cetirizine, 10 mg, Oral, Daily  clopidogrel, 75 mg, Oral, Daily  estrogens (conjugated), 0.3 mg, Oral, Daily  famotidine, 20 mg, Oral, BID AC  metoprolol succinate XL, 125 mg, Oral, Q24H  multivitamin, 1 tablet, Oral, Daily  neomycin-bacitracin-polymyxin, 1 application, Topical, Q12H  polyethylene glycol, 17 g, Oral, Daily  valsartan, 80 mg, Oral, Q24H  vitamin B-12, 1,000 mcg, Oral, Daily  vitamin B-6, 25 mg, Oral, TID  vitamin D3, 5,000 Units, Oral, Daily  vitamin E, 400 Units, Oral, BID         Vitals:    09/04/22 0902   BP: 105/70   Pulse: 113   Resp: 16   Temp: 98 °F (36.7 °C)   SpO2: 96%         Intake/Output Summary (Last 24 hours) at 9/4/2022 1303  Last data filed at 9/4/2022 0902  Gross per 24 hour   Intake 960 ml   Output 1400 ml   Net -440 ml       EXAM: Pleasant, awake alert, answers questions appropriately, can still lift her left leg off the bed, upper extremity strength is symmetric, lungs are clear, heart is irregularly irregular rhythm without murmur abdomen soft, benign.      Diet Regular        LABS:     Lab Results (last 48 hours)     Procedure Component Value Units Date/Time    Comprehensive Metabolic Panel [[id number]]  (Abnormal) Collected: 09/04/22 0032    Specimen: Blood Updated: 09/04/22 0147     Glucose 108 mg/dL      BUN 10 mg/dL      Creatinine 0.94 mg/dL      Sodium 135 mmol/L      Potassium 4.1 mmol/L      Chloride 104 mmol/L      CO2 21.4 mmol/L      Calcium 9.6 mg/dL      Total Protein 6.1 g/dL      Albumin 2.78 g/dL      ALT (SGPT) 26 U/L      AST (SGOT) 35 U/L      Alkaline Phosphatase 111 U/L      Total  Bilirubin 1.1 mg/dL      Globulin 3.3 gm/dL      A/G Ratio 0.8 g/dL      BUN/Creatinine Ratio 10.6     Anion Gap 9.6 mmol/L      eGFR 61.1 mL/min/1.73      Comment: National Kidney Foundation and American Society of Nephrology (ASN) Task Force recommended calculation based on the Chronic Kidney Disease Epidemiology Collaboration (CKD-EPI) equation refit without adjustment for race.       Narrative:      GFR Normal >60  Chronic Kidney Disease <60  Kidney Failure <15      CBC & Differential [[id number]]  (Abnormal) Collected: 09/04/22 0032    Specimen: Blood Updated: 09/04/22 0128    Narrative:      The following orders were created for panel order CBC & Differential.  Procedure                               Abnormality         Status                     ---------                               -----------         ------                     CBC Auto Differential[[id number]]        Abnormal            Final result                 Please view results for these tests on the individual orders.    CBC Auto Differential [[id number]]  (Abnormal) Collected: 09/04/22 0032    Specimen: Blood Updated: 09/04/22 0128     WBC 5.40 10*3/mm3      RBC 3.28 10*6/mm3      Hemoglobin 10.2 g/dL      Hematocrit 33.3 %      .5 fL      MCH 31.1 pg      MCHC 30.6 g/dL      RDW 16.6 %      RDW-SD 60.6 fl      MPV 8.9 fL      Platelets 284 10*3/mm3      Neutrophil % 55.1 %      Lymphocyte % 29.3 %      Monocyte % 11.1 %      Eosinophil % 3.5 %      Basophil % 0.6 %      Immature Grans % 0.4 %      Neutrophils, Absolute 2.98 10*3/mm3      Lymphocytes, Absolute 1.58 10*3/mm3      Monocytes, Absolute 0.60 10*3/mm3      Eosinophils, Absolute 0.19 10*3/mm3      Basophils, Absolute 0.03 10*3/mm3      Immature Grans, Absolute 0.02 10*3/mm3      nRBC 0.0 /100 WBC     Folate [[id number]]  (Normal) Collected: 09/03/22 0530    Specimen: Blood Updated: 09/03/22 1245     Folate 6.27 ng/mL     Narrative:      Results may be falsely increased if patient  taking Biotin.      Vitamin B12 [[id number]]  (Abnormal) Collected: 09/03/22 0530    Specimen: Blood Updated: 09/03/22 1245     Vitamin B-12 1,612 pg/mL     Narrative:      Results may be falsely increased if patient taking Biotin.      TSH [[id number]]  (Normal) Collected: 09/03/22 0530    Specimen: Blood Updated: 09/03/22 0614     TSH 2.170 uIU/mL                Radiology:    Imaging Results (Last 72 Hours)     ** No results found for the last 72 hours. **          Results for orders placed during the hospital encounter of 08/18/22    Adult Transthoracic Echo Complete W/ Cont if Necessary Per Protocol    Interpretation Summary  · Estimated left ventricular EF = 66% Left ventricular ejection fraction appears to be 66 - 70%. Left ventricular systolic function is hyperdynamic (EF > 70%).  · Mild to moderate aortic valve regurgitation is present.  · Left ventricular wall thickness is consistent with hypertrophy.  · Left ventricular diastolic function was indeterminate.  · There is calcification of the aortic valve.  · Estimated right ventricular systolic pressure from tricuspid regurgitation is normal (<35 mmHg). Calculated right ventricular systolic pressure from tricuspid regurgitation is 33 mmHg.      Assessment/Plan:   Left hip fracture, patient actually according to her took 16 steps, there is 8 feet mentioned on the PT note.  This is progress.  Pain is overall controlled, continue intensive therapy both physical and occupational.    Atrial fibrillation with RVR, Toprol adjusted last week with decreasing Diovan to be able to tolerate the Toprol, blood pressures controlled as is heart rate, continue to monitor closely while undergoing rehab.  On Eliquis for stroke prevention.    Coronary artery disease, status post bypass and PCI in the past, continue Plavix and statin.  Transaminases acceptable on statin.    Constipation, improved after cathartics.    Macrocytic anemia, B12 folate and TSH normal, monitor,  stable    DVT prophylaxis, Eliquis will serve for this.    Clinically insignificant hyponatremia    Hypertension, controlled    Kalen Galvez MD

## 2022-09-04 NOTE — PROGRESS NOTES
Rehabilitation Nursing  Inpatient Rehabilitation Plan of Care Note    Plan of Care  Copy from POC    Mobility    Bed/Chair/Wheelchair (Active)  Current Status (8/25/2022 12:00:00 AM): Increase in physical activity  Weekly Goal: Joint mobility maintained  Discharge Goal: Demonstrate use of adaptive equipment.    Safety    Potential for Injury (Active)  Current Status (8/25/2022 12:00:00 AM): No falls this shift  Weekly Goal: No falls this hospital stay  Discharge Goal: Identy measures to prevent injury    RN Interventions    Safety -  RN: Non skid foot wear when out of bed. SR up x 2. Call bell and bedside items  within reach at all times.  [RN]    Mobility -  RN: PT and OT consults. Assess moility every shift and prn. Assist with  repositioning every 2 hours. Assess skin every shift. Assist with ADLs.  [RN]    Self Care -  OT: ADL retraining/AE education, fxal mobility, BUE ther ex/act, G[facility] [RN]    Signed by: Nurse Betina

## 2022-09-04 NOTE — PLAN OF CARE
Problem: Rehabilitation (IRF) Plan of Care  Goal: Plan of Care Review  Outcome: Ongoing, Progressing  Flowsheets (Taken 9/4/2022 0455)  Progress: improving  Plan of Care Reviewed With: patient  Outcome Evaluation:   pt resting well throughout the night   calm and cooperative   no acute distress  Goal: Patient-Specific Goal (Individualized)  Outcome: Ongoing, Progressing  Goal: Absence of New-Onset Illness or Injury  Outcome: Ongoing, Progressing  Intervention: Prevent Fall and Fall Injury  Recent Flowsheet Documentation  Taken 9/4/2022 0401 by Mirlande Ward, RN  Safety Promotion/Fall Prevention:   safety round/check completed   clutter free environment maintained   nonskid shoes/slippers when out of bed   fall prevention program maintained  Taken 9/4/2022 0216 by Mirlande Ward RN  Safety Promotion/Fall Prevention:   nonskid shoes/slippers when out of bed   clutter free environment maintained   safety round/check completed   fall prevention program maintained  Taken 9/3/2022 2355 by Mirlande Ward, RN  Safety Promotion/Fall Prevention:   safety round/check completed   fall prevention program maintained   nonskid shoes/slippers when out of bed   clutter free environment maintained  Taken 9/3/2022 2200 by Mirlande Ward, RN  Safety Promotion/Fall Prevention:   room organization consistent   safety round/check completed   clutter free environment maintained   fall prevention program maintained   nonskid shoes/slippers when out of bed  Taken 9/3/2022 2000 by Mirlande Ward, RN  Safety Promotion/Fall Prevention:   safety round/check completed   nonskid shoes/slippers when out of bed   fall prevention program maintained   clutter free environment maintained  Goal: Optimal Comfort and Wellbeing  Outcome: Ongoing, Progressing  Goal: Home and Community Transition Plan Established  Outcome: Ongoing, Progressing  Goal: Plan of Care Review  Outcome: Ongoing,  Progressing  Flowsheets (Taken 9/4/2022 0455)  Progress: improving  Plan of Care Reviewed With: patient  Outcome Evaluation:   pt resting well throughout the night   calm and cooperative   no acute distress  Goal: Patient-Specific Goal (Individualized)  Outcome: Ongoing, Progressing  Goal: Absence of New-Onset Illness or Injury  Outcome: Ongoing, Progressing  Intervention: Prevent Fall and Fall Injury  Recent Flowsheet Documentation  Taken 9/4/2022 0401 by Mirlande Ward, RN  Safety Promotion/Fall Prevention:   safety round/check completed   clutter free environment maintained   nonskid shoes/slippers when out of bed   fall prevention program maintained  Taken 9/4/2022 0216 by Mirlande Ward RN  Safety Promotion/Fall Prevention:   nonskid shoes/slippers when out of bed   clutter free environment maintained   safety round/check completed   fall prevention program maintained  Taken 9/3/2022 2355 by Mirlande Ward, RN  Safety Promotion/Fall Prevention:   safety round/check completed   fall prevention program maintained   nonskid shoes/slippers when out of bed   clutter free environment maintained  Taken 9/3/2022 2200 by Mirlande Ward, RN  Safety Promotion/Fall Prevention:   room organization consistent   safety round/check completed   clutter free environment maintained   fall prevention program maintained   nonskid shoes/slippers when out of bed  Taken 9/3/2022 2000 by Mirlande Ward, RN  Safety Promotion/Fall Prevention:   safety round/check completed   nonskid shoes/slippers when out of bed   fall prevention program maintained   clutter free environment maintained  Goal: Optimal Comfort and Wellbeing  Outcome: Ongoing, Progressing  Goal: Home and Community Transition Plan Established  Outcome: Ongoing, Progressing   Goal Outcome Evaluation:  Plan of Care Reviewed With: patient        Progress: improving  Outcome Evaluation: pt resting well throughout the night;  calm and cooperative; no acute distress

## 2022-09-04 NOTE — PLAN OF CARE
Goal Outcome Evaluation:  Plan of Care Reviewed With: patient        Progress: improving  Outcome Evaluation: patient is resting in bed at this time. prn meds given for pain. patient assisted to the bathroom and requested enema. miralax also given with daily meds. continue plan of care.

## 2022-09-05 NOTE — PLAN OF CARE
Goal Outcome Evaluation:  Plan of Care Reviewed With: patient        Progress: improving  Outcome Evaluation: patient up with therapy this shift. prn meds being given for pain. continue plan of care.

## 2022-09-05 NOTE — PLAN OF CARE
Problem: Rehabilitation (IRF) Plan of Care  Goal: Plan of Care Review  Outcome: Ongoing, Progressing  Flowsheets (Taken 9/5/2022 0511)  Progress: improving  Plan of Care Reviewed With: patient  Outcome Evaluation:   pt resting well throughout the night   calm and cooperative   no acute distress noted.  Goal: Patient-Specific Goal (Individualized)  Outcome: Ongoing, Progressing  Goal: Absence of New-Onset Illness or Injury  Outcome: Ongoing, Progressing  Intervention: Prevent Fall and Fall Injury  Recent Flowsheet Documentation  Taken 9/5/2022 0401 by Mirlande Ward, RN  Safety Promotion/Fall Prevention:   safety round/check completed   fall prevention program maintained   clutter free environment maintained   nonskid shoes/slippers when out of bed  Taken 9/5/2022 0201 by Mirlande Ward, RN  Safety Promotion/Fall Prevention:   safety round/check completed   clutter free environment maintained   nonskid shoes/slippers when out of bed  Taken 9/5/2022 0001 by Mirlande Ward, RN  Safety Promotion/Fall Prevention:   safety round/check completed   fall prevention program maintained   nonskid shoes/slippers when out of bed   clutter free environment maintained  Taken 9/4/2022 2200 by Mirlande Ward, RN  Safety Promotion/Fall Prevention:   safety round/check completed   clutter free environment maintained   fall prevention program maintained   nonskid shoes/slippers when out of bed  Taken 9/4/2022 2000 by Mirlande Ward, RN  Safety Promotion/Fall Prevention:   clutter free environment maintained   nonskid shoes/slippers when out of bed   room organization consistent   safety round/check completed  Goal: Optimal Comfort and Wellbeing  Outcome: Ongoing, Progressing  Goal: Home and Community Transition Plan Established  Outcome: Ongoing, Progressing  Goal: Plan of Care Review  Outcome: Ongoing, Progressing  Flowsheets (Taken 9/5/2022 0511)  Progress: improving  Plan of Care  Reviewed With: patient  Outcome Evaluation:   pt resting well throughout the night   calm and cooperative   no acute distress noted.  Goal: Patient-Specific Goal (Individualized)  Outcome: Ongoing, Progressing  Goal: Absence of New-Onset Illness or Injury  Outcome: Ongoing, Progressing  Intervention: Prevent Fall and Fall Injury  Recent Flowsheet Documentation  Taken 9/5/2022 0401 by Mirlande Ward, RN  Safety Promotion/Fall Prevention:   safety round/check completed   fall prevention program maintained   clutter free environment maintained   nonskid shoes/slippers when out of bed  Taken 9/5/2022 0201 by Mirlande Ward, RN  Safety Promotion/Fall Prevention:   safety round/check completed   clutter free environment maintained   nonskid shoes/slippers when out of bed  Taken 9/5/2022 0001 by Mirlande Ward, RN  Safety Promotion/Fall Prevention:   safety round/check completed   fall prevention program maintained   nonskid shoes/slippers when out of bed   clutter free environment maintained  Taken 9/4/2022 2200 by Mirlande Ward, RN  Safety Promotion/Fall Prevention:   safety round/check completed   clutter free environment maintained   fall prevention program maintained   nonskid shoes/slippers when out of bed  Taken 9/4/2022 2000 by Mirlande Ward, RN  Safety Promotion/Fall Prevention:   clutter free environment maintained   nonskid shoes/slippers when out of bed   room organization consistent   safety round/check completed  Goal: Optimal Comfort and Wellbeing  Outcome: Ongoing, Progressing  Goal: Home and Community Transition Plan Established  Outcome: Ongoing, Progressing   Goal Outcome Evaluation:  Plan of Care Reviewed With: patient        Progress: improving  Outcome Evaluation: pt resting well throughout the night; calm and cooperative; no acute distress noted.

## 2022-09-05 NOTE — PROGRESS NOTES
Inpatient Rehabilitation Functional Measures Assessment and Plan of Care    Plan of Care  Self Care    [OT] Dressing (Upper)(Resolved)  Current Status(09/05/2022): setup  Weekly Goal(09/06/2022): setup  Discharge Goal: setup    [OT] Dressing (Lower)(Active)  Current Status(09/05/2022): maxA  Weekly Goal(09/13/2022): modA  Discharge Goal: modA    Performed Intervention(s)  ADL retraining/AE education, fxal mobility, BUE ther ex/act, GMC/FMC    Functional Measures  CUBA Eating:  CUBA Grooming:  CUBA Bathing:  CUBA Upper Body Dressing:  CUBA Lower Body Dressing:  CUBA Toileting:    CUBA Bladder Management  Level of Assistance:  Frequency/Number of Accidents this Shift:    CUBA Bowel Management  Level of Assistance:  Frequency/Number of Accidents this Shift:    CUBA Bed/Chair/Wheelchair Transfer:  CUBA Toilet Transfer:  CUBA Tub/Shower Transfer:    Previously Documented Mode of Locomotion at Discharge:  CUBA Expected Mode of Locomotion at Discharge:  CUBA Walk/Wheelchair:  CUBA Stairs:    CUBA Comprehension:  CUBA Expression:  CUBA Social Interaction:  CUBA Problem Solving:  CUBA Memory:    Therapy Mode Minutes  Occupational Therapy: Individual: 90 minutes.  Physical Therapy:  Speech Language Pathology:    Discharge Functional Goals:    Signed by: Karen Lombardi OT

## 2022-09-05 NOTE — PROGRESS NOTES
[facility]  PROGRESS NOTE     Patient Identification:  Name:  Eloina Batista  Age:  82 y.o.  Sex:  female  :  1940  MRN:  [id number]  Visit Number:  [id number]  ROOM: [facility]/     Primary Care Provider:  Alex Og MD    Length of stay in inpatient status:  11    Subjective     Chief Compliant:  No chief complaint on file.      History of Presenting Illness: 82-year-old female who is status post left hip fracture with repair.  Patient states the pain is reasonably well controlled and that she is making progress with her therapy goals at this time.  Has no new complaints    Objective     Current Hospital Meds:apixaban, 5 mg, Oral, Q12H  atorvastatin, 80 mg, Oral, Nightly  calcium carbonate (oyster shell), 500 mg, Oral, Daily  cetirizine, 10 mg, Oral, Daily  clopidogrel, 75 mg, Oral, Daily  estrogens (conjugated), 0.3 mg, Oral, Daily  famotidine, 20 mg, Oral, BID AC  metoprolol succinate XL, 125 mg, Oral, Q24H  multivitamin, 1 tablet, Oral, Daily  neomycin-bacitracin-polymyxin, 1 application, Topical, Q12H  polyethylene glycol, 17 g, Oral, Daily  valsartan, 80 mg, Oral, Q24H  vitamin B-12, 1,000 mcg, Oral, Daily  vitamin B-6, 25 mg, Oral, TID  vitamin D3, 5,000 Units, Oral, Daily  vitamin E, 400 Units, Oral, BID       ----------------------------------------------------------------------------------------------------------------------  Vital Signs:  Temp:  [97.9 °F (36.6 °C)-98.3 °F (36.8 °C)] 97.9 °F (36.6 °C)  Heart Rate:  [] 108  Resp:  [18-20] 20  BP: (126-131)/(70-83) 126/83  SpO2:  [94 %-96 %] 94 %  on   ;   Device (Oxygen Therapy): room air  Body mass index is 28.29 kg/m².    Wt Readings from Last 3 Encounters:   22 77.1 kg (170 lb)   22 77.5 kg (170 lb 13.7 oz)   21 77.1 kg (170 lb)     Intake & Output (last 3 days)        0701   0700  0701   07 07 07 0701   0700    P.O. 960 1320 1320 240    Total Intake(mL/kg)  960 (12.5) 1320 (17.1) 1320 (17.1) 240 (3.1)    Urine (mL/kg/hr) 650 (0.4) 1400 (0.8) 1550 (0.8)     Stool   0     Total Output 650 1400 1550     Net +310 -80 -230 +240            Urine Unmeasured Occurrence 3 x       Stool Unmeasured Occurrence   1 x         Diet Regular  ----------------------------------------------------------------------------------------------------------------------  Physical exam:  Constitutional:   No acute distress  HEENT: Normocephalic atraumatic  Neck: Supple   Cardiovascular: Irregularly irregular, ventricular rate reasonably well controlled in the 90s  Pulmonary/Chest: Clear to auscultation  Abdominal: Positive bowel sounds soft.   Musculoskeletal: Status post left hip repair  Neurological: No focal deficits  Skin: No rash  Peripheral vascular:  Genitourinary:  ----------------------------------------------------------------------------------------------------------------------    Last echocardiogram:  Results for orders placed during the hospital encounter of 08/18/22    Adult Transthoracic Echo Complete W/ Cont if Necessary Per Protocol    Interpretation Summary  · Estimated left ventricular EF = 66% Left ventricular ejection fraction appears to be 66 - 70%. Left ventricular systolic function is hyperdynamic (EF > 70%).  · Mild to moderate aortic valve regurgitation is present.  · Left ventricular wall thickness is consistent with hypertrophy.  · Left ventricular diastolic function was indeterminate.  · There is calcification of the aortic valve.  · Estimated right ventricular systolic pressure from tricuspid regurgitation is normal (<35 mmHg). Calculated right ventricular systolic pressure from tricuspid regurgitation is 33 mmHg.    ----------------------------------------------------------------------------------------------------------------------  Results from last 7 days   Lab Units 09/04/22  0032 09/02/22  0728   WBC 10*3/mm3 5.40 9.64   HEMOGLOBIN g/dL 10.2* 9.4*   HEMATOCRIT  % 33.3* 30.7*   MCV fL 101.5* 102.7*   MCHC g/dL 30.6* 30.6*   PLATELETS 10*3/mm3 284 286         Results from last 7 days   Lab Units 09/04/22  0032 09/02/22  0728   SODIUM mmol/L 135* 137   POTASSIUM mmol/L 4.1 4.2   CHLORIDE mmol/L 104 103   CO2 mmol/L 21.4* 23.5   BUN mg/dL 10 11   CREATININE mg/dL 0.94 0.96   CALCIUM mg/dL 9.6 9.4   GLUCOSE mg/dL 108* 119*   ALBUMIN g/dL 2.78* 2.61*   BILIRUBIN mg/dL 1.1 1.3*   ALK PHOS U/L 111 107   AST (SGOT) U/L 35* 36*   ALT (SGPT) U/L 26 24   Estimated Creatinine Clearance: 48.2 mL/min (by C-G formula based on SCr of 0.94 mg/dL).  No results found for: AMMONIA  Results from last 7 days   Lab Units 09/02/22  0728   CK TOTAL U/L 43             No results found for: HGBA1C, POCGLU  Lab Results   Component Value Date    TSH 2.170 09/03/2022     No results found for: PREGTESTUR, PREGSERUM, HCG, HCGQUANT  Pain Management Panel    There is no flowsheet data to display.       Brief Urine Lab Results  (Last result in the past 365 days)      Color   Clarity   Blood   Leuk Est   Nitrite   Protein   CREAT   Urine HCG        08/21/22 0805 Yellow   Clear   Negative   Moderate (2+)   Negative   Negative               No results found for: BLOODCX      No results found for: URINECX  No results found for: WOUNDCX  No results found for: STOOLCX        I have personally looked at the labs and they are summarized above.  ----------------------------------------------------------------------------------------------------------------------  Detailed radiology reports for the last 24 hours:    Imaging Results (Last 24 Hours)     ** No results found for the last 24 hours. **        Final impressions for the last 30 days of radiology reports:    XR Hand 2 View Left    Result Date: 8/28/2022    No acute findings in the left hand.  This report was finalized on 8/28/2022 12:17 PM by Dr. Joshua Chaudhry MD.      XR Femur 2 View Left    Result Date: 8/20/2022  Left hip arthroplasty noted in place. There  is no evidence of additional, previously unidentified fracture or dislocation. The knee joint is partially imaged and appears normally aligned.  This report was finalized on 8/20/2022 9:13 AM by Aristeo Duvall.      CT Pelvis Without Contrast    Result Date: 8/18/2022  Acute noncomminuted subcapital neck fracture of left proximal femur. Associated, relatively mild diffuse subcutaneous bruising. No evidence of acute trauma elsewhere.  This report was finalized on 8/18/2022 11:36 PM by Dr. Vinayak Birch MD.      XR Chest 1 View    Result Date: 8/21/2022  No acute cardiopulmonary abnormality. Electronically signed by:  Jose Schulz M.D.  8/21/2022 5:46 AM Mountain Time    XR Chest 1 View    Result Date: 8/19/2022  1.  No evidence for acute cardiopulmonary process.  This report was finalized on 8/19/2022 8:54 AM by Jeramy Mejia MD.      CT Lower Extremity Left Without Contrast    Result Date: 8/21/2022  1.  Status post left hip arthroplasty. No evidence of periprosthetic fracture within limitations of artifact from the arthroplasty. Electronically signed by:  Roger Rucker M.D.  8/21/2022 12:08 AM Mountain Time    CT Lower Extremity Left Without Contrast    Result Date: 8/20/2022  No evidence of acute fracture. Arthroplasty hardware appears in appropriate position. There is a large hematoma present posterior to the femoral component and deep to the gluteus musculature, measuring 8.8 x 4.8 x 5.5 cm.  This report was finalized on 8/20/2022 3:06 PM by Aristeo Duvall.      XR Spine Lumbar Complete 4+VW    Result Date: 8/18/2022  Arthritic change, but no acute bony abnormality  This report was finalized on 8/18/2022 7:59 AM by Dr. Joshua Chaudhry MD.      XR Hip With or Without Pelvis 1 View Left    Result Date: 8/20/2022  Postarthroplasty changes without identified complication. Electronically signed by:  Jose Schulz M.D.  8/20/2022 5:02 AM Mountain Time    XR Hip With or Without Pelvis 2 - 3 View Left    Result Date:  9/1/2022    Left hip prosthesis. Alignment anatomic.  This report was finalized on 9/1/2022 11:41 AM by Dr. Joshua Chaudhry MD.      XR Hip With or Without Pelvis 2 - 3 View Left    Result Date: 8/20/2022  Postsurgical findings of interval left total hip arthroplasty without evidence of immediate hardware complication. Expected soft tissue edema and subcutaneous emphysema.    This report was finalized on 8/20/2022 5:54 AM by Aristeo Duvall.      XR Hips Bilateral With or Without Pelvis 5 View    Result Date: 8/18/2022  Mild arthritic change.  This report was finalized on 8/18/2022 9:31 AM by Dr. Joshua Chaudhry MD.      I have personally looked at the radiology images and read the final radiology report.    Assessment & Plan    Status post left hip fracture with ORIF--patient requiring supervision for up with bed mobility; contact-guard to minimum assist for transfers; ambulated 8 feet with a front wheel walker and contact-guard last week.  Patient requiring minimum assist for upper body dressing moderate assistance for bathing; maximum assist for lower body dressing set up for grooming; moderate assistance for toileting.    Atrial fibrillation continue Eliquis 5 mg twice daily.  With Toprol- mg daily    CAD--currently on Lipitor 80 mg daily, Plavix 75 mg daily    Hypertension continue metoprolol, valsartan.    Anemia stable    VTE Prophylaxis:   Mechanical Order History:     None      Pharmalogical Order History:      Ordered     Dose Route Frequency Stop    08/25/22 2136  apixaban (ELIQUIS) tablet 5 mg         5 mg PO Every 12 Hours Scheduled --                    Jose Fatima MD  [facility]  09/05/22  10:16 EDT

## 2022-09-05 NOTE — THERAPY TREATMENT NOTE
Inpatient Rehabilitation - Physical Therapy Treatment Note       [facility]     Patient Name: Eloina Batista  : 1940  MRN: [id number]    Today's Date: 2022                    Admit Date: 2022      Visit Dx:   No diagnosis found.    Patient Active Problem List   Diagnosis   • Chest pain   • CAD (coronary artery disease)   • Essential hypertension   • Hyperlipidemia   • Sebaceous cyst   • Left displaced femoral neck fracture (HCC)   • Fall, initial encounter   • Atrial fibrillation with rapid ventricular response (HCC)   • Stage 3a chronic kidney disease (HCC)   • S/p left hip fracture       Past Medical History:   Diagnosis Date   • Arthritis    • Coronary artery disease    • GERD (gastroesophageal reflux disease)    • Hyperlipidemia    • Hypertension        Past Surgical History:   Procedure Laterality Date   • APPENDECTOMY     • BLADDER REPAIR     • CARDIAC CATHETERIZATION     • CARDIAC CATHETERIZATION N/A 3/22/2017    Procedure: Left Heart Cath;  Surgeon: Alex Blanco MD;  Location: [facility] INVASIVE LOCATION;  Service:    • CARDIAC SURGERY     • CHOLECYSTECTOMY     • CORONARY ARTERY BYPASS GRAFT     • CORONARY STENT PLACEMENT     • HERNIA REPAIR     • HIP HEMIARTHROPLASTY Left 2022    Procedure: HIP HEMIARTHROPLASTY LEFT;  Surgeon: Jeramy Reid Jr., MD;  Location: [facility] OR;  Service: Orthopedics;  Laterality: Left;   • HYSTERECTOMY         PT ASSESSMENT (last 12 hours)     IRF PT Evaluation and Treatment     Row Name 22 1000          PT Time and Intention    Document Type daily treatment  -KM     Mode of Treatment individual therapy;physical therapy  -KM     Patient/Family/Caregiver Comments/Observations Pt. c/o dizziness and fatigue throughout PT session  -KM     Row Name 22 1000          General Information    Patient Profile Reviewed yes  -KM     Existing Precautions/Restrictions fall;left;hip, posterior  -KM     Row Name 22 1000           Cognition/Psychosocial    Affect/Mental Status (Cognition) WFL;anxious  -KM     Follows Commands (Cognition) follows one-step commands;verbal cues/prompting required  -KM     Row Name 09/05/22 1000          Bed Mobility    Bed Mobility supine-sit;sit-supine  -KM     Supine-Sit [place] (Bed Mobility) contact guard  -KM     Sit-Supine [place] (Bed Mobility) minimum assist (75% patient effort)  -KM     Assistive Device (Bed Mobility) bed rails  -KM     Row Name 09/05/22 1000          Transfer Assessment/Treatment    Transfers sit-stand transfer;bed-chair transfer;chair-bed transfer;stand-sit transfer  -KM     Row Name 09/05/22 1000          Transfers    Bed-Chair [place] (Transfers) contact guard;minimum assist (75% patient effort)  -KM     Chair-Bed [place] (Transfers) contact guard;minimum assist (75% patient effort)  -KM     Assistive Device (Bed-Chair Transfers) wheelchair  -KM     Sit-Stand [place] (Transfers) contact guard  -KM     Stand-Sit [place] (Transfers) contact guard  -KM     Row Name 09/05/22 1000          Chair-Bed Transfer    Assistive Device (Chair-Bed Transfers) wheelchair  -KM     Row Name 09/05/22 1000          Sit-Stand Transfer    Assistive Device (Sit-Stand Transfers) wheelchair  -KM     Row Name 09/05/22 1000          Stand-Sit Transfer    Assistive Device (Stand-Sit Transfers) wheelchair  -KM     Row Name 09/05/22 1000          Gait/Stairs (Locomotion)    Gait/Stairs Locomotion gait/ambulation independence;distance ambulated  -KM     [place] Level (Gait) contact guard;verbal cues  -KM     Assistive Device (Gait) walker, front-wheeled  -KM     Distance in Feet (Gait) 40  -KM     Pattern (Gait) step-to  -KM     Deviations/Abnormal Patterns (Gait) antalgic;ifeanyi decreased;gait speed decreased;stride length decreased;weight shifting decreased  -KM     Bilateral Gait Deviations forward flexed posture  -KM     Row Name 09/05/22 1000          Safety Issues,  Functional Mobility    Impairments Affecting Function (Mobility) balance;endurance/activity tolerance;pain;postural/trunk control;range of motion (ROM);strength  -KM     Row Name 09/05/22 1000          Motor Skills    Therapeutic Exercise hip;knee;ankle  -KM     Row Name 09/05/22 1000          Hip (Therapeutic Exercise)    Hip (Therapeutic Exercise) AROM (active range of motion)  -KM     Hip AROM (Therapeutic Exercise) bilateral;flexion;aBduction;aDduction  -KM     Row Name 09/05/22 1000          Knee (Therapeutic Exercise)    Knee (Therapeutic Exercise) AROM (active range of motion)  -KM     Knee AROM (Therapeutic Exercise) bilateral;flexion;extension  -KM     Row Name 09/05/22 1000          Ankle (Therapeutic Exercise)    Ankle (Therapeutic Exercise) AROM (active range of motion)  -KM     Ankle AROM (Therapeutic Exercise) bilateral;dorsiflexion;plantarflexion  -KM     Row Name 09/05/22 1000          Daily Progress Summary (PT)    Functional Goal Overall Progress (PT) progressing toward functional goals slower than expected  -KM     Daily Progress Summary (PT) Pt. demonstrates improved mobilty skills. She is able to improve ambulation distance. She has ongoing complaints of dizziness during PT sessions. Pt. would continue to benefit from skilled PT services.  -KM     Impairments Still Limiting Function (PT) functional activity tolerance impairment;pain;strength deficit  -KM           User Key  (r) = Recorded By, (t) = Taken By, (c) = Cosigned By    Initials Name Provider Type    Sergey Rajan, PT Physical Therapist              Wound 08/18/22 2300 Left palm Skin Tear (Active)   Closure None 09/04/22 2108   Base clean;dry 09/04/22 2108   Drainage Amount none 09/04/22 2108     Physical Therapy Education                 Title: PT OT SLP Therapies (Done)     Topic: Physical Therapy (Done)     Point: Mobility training (Done)     Learning Progress Summary           Patient Acceptance, E,TB, VU by  at 9/2/2022 4359       Show all documentation for this point (6)                 Point: Home exercise program (Done)     Learning Progress Summary           Patient Acceptance, E,TB, VU by  at 9/2/2022 1540      Show all documentation for this point (6)                 Point: Body mechanics (Done)     Learning Progress Summary           Patient Acceptance, E,TB, VU by  at 9/2/2022 1540      Show all documentation for this point (6)                 Point: Precautions (Done)     Learning Progress Summary           Patient Acceptance, E,TB, VU by  at 9/2/2022 1540      Show all documentation for this point (6)                             User Key     Initials Effective Dates Name Provider Type Discipline     06/16/21 -  Keshia Douglass, PTA Physical Therapist Assistant PT                PT Recommendation and Plan          Daily Progress Summary (PT)  Functional Goal Overall Progress (PT): progressing toward functional goals slower than expected  Daily Progress Summary (PT): Pt. demonstrates improved mobilty skills. She is able to improve ambulation distance. She has ongoing complaints of dizziness during PT sessions. Pt. would continue to benefit from skilled PT services.  Impairments Still Limiting Function (PT): functional activity tolerance impairment, pain, strength deficit               Time Calculation:      PT Charges     Row Name 09/05/22 1038             Time Calculation    Start Time 0745  -KM      Stop Time 0915  -KM      Time Calculation (min) 90 min  -KM      PT Received On 09/05/22  -KM              Time Calculation- PT    Total Timed Code Minutes- PT 90 minute(s)  -KM            User Key  (r) = Recorded By, (t) = Taken By, (c) = Cosigned By    Initials Name Provider Type     Sergey Finch, PT Physical Therapist                Therapy Charges for Today     Code Description Service Date Service Provider Modifiers Qty    44701154393  GAIT TRAINING EA 15 MIN 9/5/2022 Sergey Finch, PT GP 1    72795334149 HC PT  THER PROC EA 15 MIN 9/5/2022 Sergey Finch, PT GP 3    59949414409 HC PT THERAPEUTIC ACT EA 15 MIN 9/5/2022 Sergey Finch, PT GP 2            PT G-Codes  AM-PAC 6 Clicks Score (PT): 6      Sergey Finch, PT  9/5/2022

## 2022-09-05 NOTE — THERAPY TREATMENT NOTE
Inpatient Rehabilitation - Occupational Therapy Treatment Note    [facility]     Patient Name: Eloina Batista  : 1940  MRN: [id number]    Today's Date: 2022                 Admit Date: 2022       No diagnosis found.    Patient Active Problem List   Diagnosis   • Chest pain   • CAD (coronary artery disease)   • Essential hypertension   • Hyperlipidemia   • Sebaceous cyst   • Left displaced femoral neck fracture (HCC)   • Fall, initial encounter   • Atrial fibrillation with rapid ventricular response (HCC)   • Stage 3a chronic kidney disease (HCC)   • S/p left hip fracture       Past Medical History:   Diagnosis Date   • Arthritis    • Coronary artery disease    • GERD (gastroesophageal reflux disease)    • Hyperlipidemia    • Hypertension        Past Surgical History:   Procedure Laterality Date   • APPENDECTOMY     • BLADDER REPAIR     • CARDIAC CATHETERIZATION     • CARDIAC CATHETERIZATION N/A 3/22/2017    Procedure: Left Heart Cath;  Surgeon: Alex Blanco MD;  Location: [facility] INVASIVE LOCATION;  Service:    • CARDIAC SURGERY     • CHOLECYSTECTOMY     • CORONARY ARTERY BYPASS GRAFT     • CORONARY STENT PLACEMENT     • HERNIA REPAIR     • HIP HEMIARTHROPLASTY Left 2022    Procedure: HIP HEMIARTHROPLASTY LEFT;  Surgeon: Jeramy Reid Jr., MD;  Location: [facility] OR;  Service: Orthopedics;  Laterality: Left;   • HYSTERECTOMY               IRF OT ASSESSMENT FLOWSHEET (last 12 hours)     IRF OT Evaluation and Treatment     Row Name 22 1452          OT Time and Intention    Document Type daily treatment  -TM     Mode of Treatment occupational therapy  -TM     Patient Effort adequate  -TM     Symptoms Noted During/After Treatment none  -TM     Row Name 22 1454          General Information    General Observations of Patient Pt agreeable for therapy.  -TM     Existing Precautions/Restrictions fall;left;hip, posterior  -TM     Row Name 22 1456          Cognition/Psychosocial  [place] [facility] (San Vicente Jacqueline Reynolds MD fp sc [id number]       VOICEMAIL DOCUMENTATION - ERASE IF NOT USED  Hi, this message is for  NATHAN  This is ALEXX from [address] [place] [address] [phone]*. Have a great day.    Orientation Status (Cognition) oriented x 4  -TM     Follows Commands (Cognition) follows one-step commands;verbal cues/prompting required  -TM     Row Name 09/05/22 1452          Upper Body Dressing    [place] Level (Upper Body Dressing) set up assistance  -TM     Position (Upper Body Dressing) supported sitting  -TM     Row Name 09/05/22 1452          Lower Body Dressing    Comment (Lower Body Dressing) maxA  -TM     Row Name 09/05/22 1452          Grooming    [place] Level (Grooming) set up  -TM     Position (Grooming) supported sitting  -TM     Row Name 09/05/22 1452          Toileting    [place] Level (Toileting) minimum assist (75% patient effort);moderate assist (50% patient effort);verbal cues  -TM     Assistive Device Use (Toileting) raised toilet seat;grab bar/safety frame  -TM     Position (Toileting) supported sitting  -TM     Row Name 09/05/22 1452          Transfers    Bed-Chair [place] (Transfers) minimum assist (75% patient effort);verbal cues  -TM     Chair-Bed [place] (Transfers) minimum assist (75% patient effort);verbal cues  -TM     Assistive Device (Bed-Chair Transfers) wheelchair  -TM     [place] Level (Toilet Transfer) minimum assist (75% patient effort);verbal cues  -TM     Assistive Device (Toilet Transfer) grab bars/safety frame;raised toilet seat;walker, front-wheeled;wheelchair  -TM     Row Name 09/05/22 1452          Chair-Bed Transfer    Assistive Device (Chair-Bed Transfers) walker, front-wheeled;wheelchair  -TM     Row Name 09/05/22 1452          Motor Skills    Motor Skills coordination;functional endurance;motor control/coordination interventions  -     Motor Control/Coordination Interventions therapeutic exercise/ROM;gross motor coordination activities;fine motor manipulation/dexterity activities;other (see comments)  BUE ther ex/act, GMC/FMC  -TM           User Key  (r) = Recorded By, (t) = Taken By, (c) = Cosigned By    Initials Name Effective  Dates    TM Karen Lombardi OT 06/16/21 -                  Occupational Therapy Education                 Title: PT OT SLP Therapies (Done)     Topic: Occupational Therapy (Done)     Point: ADL training (Done)     Description:   Instruct learner(s) on proper safety adaptation and remediation techniques during self care or transfers.   Instruct in proper use of assistive devices.              Learning Progress Summary           Patient Acceptance, E,D, VU,NR by  at 9/5/2022 1451      Show all documentation for this point (7)                 Point: Precautions (Done)     Description:   Instruct learner(s) on prescribed precautions during self-care and functional transfers.              Learning Progress Summary           Patient Acceptance, E,D, VU,NR by  at 9/5/2022 1451      Show all documentation for this point (7)                             User Key     Initials Effective Dates Name Provider Type Discipline     06/16/21 -  Karen Lombardi OT Occupational Therapist OT                    OT Recommendation and Plan    Planned Therapy Interventions (OT): activity tolerance training, BADL retraining, adaptive equipment training, IADL retraining, occupation/activity based interventions, patient/caregiver education/training, ROM/therapeutic exercise, strengthening exercise, transfer/mobility retraining                    Time Calculation:      Time Calculation- OT     Row Name 09/05/22 1451             Time Calculation- OT    OT Start Time 1245  -TM      OT Stop Time 1415  -TM      OT Time Calculation (min) 90 min  -TM      Total Timed Code Minutes- OT 90 minute(s)  -TM      OT Non-Billable Time (min) 15 min  -TM            User Key  (r) = Recorded By, (t) = Taken By, (c) = Cosigned By    Initials Name Provider Type     Karen Lombardi OT Occupational Therapist              Therapy Charges for Today     Code Description Service Date Service Provider Modifiers Qty    68877927954 HC OT SELF  CARE/MGMT/TRAIN EA 15 MIN 9/5/2022 Karen Lombardi, OT GO 2    94219732366 HC OT THERAPEUTIC ACT EA 15 MIN 9/5/2022 Karen Lombardi, OT GO 3    46070641301 HC OT THER PROC EA 15 MIN 9/5/2022 Karen Lombardi, OT GO 1                   Karen Lombardi, OT  9/5/2022

## 2022-09-06 NOTE — THERAPY TREATMENT NOTE
Inpatient Rehabilitation - Physical Therapy Treatment Note       [facility]     Patient Name: Eloina Batista  : 1940  MRN: [id number]    Today's Date: 2022                    Admit Date: 2022      Visit Dx:   No diagnosis found.    Patient Active Problem List   Diagnosis   • Chest pain   • CAD (coronary artery disease)   • Essential hypertension   • Hyperlipidemia   • Sebaceous cyst   • Left displaced femoral neck fracture (HCC)   • Fall, initial encounter   • Atrial fibrillation with rapid ventricular response (HCC)   • Stage 3a chronic kidney disease (HCC)   • S/p left hip fracture       Past Medical History:   Diagnosis Date   • Arthritis    • Coronary artery disease    • GERD (gastroesophageal reflux disease)    • Hyperlipidemia    • Hypertension        Past Surgical History:   Procedure Laterality Date   • APPENDECTOMY     • BLADDER REPAIR     • CARDIAC CATHETERIZATION     • CARDIAC CATHETERIZATION N/A 3/22/2017    Procedure: Left Heart Cath;  Surgeon: Alex Blanco MD;  Location: [facility] INVASIVE LOCATION;  Service:    • CARDIAC SURGERY     • CHOLECYSTECTOMY     • CORONARY ARTERY BYPASS GRAFT     • CORONARY STENT PLACEMENT     • HERNIA REPAIR     • HIP HEMIARTHROPLASTY Left 2022    Procedure: HIP HEMIARTHROPLASTY LEFT;  Surgeon: Jeramy Reid Jr., MD;  Location: [facility] OR;  Service: Orthopedics;  Laterality: Left;   • HYSTERECTOMY         PT ASSESSMENT (last 12 hours)     IRF PT Evaluation and Treatment     Row Name 22 1529          PT Time and Intention    Document Type daily treatment  -KM     Mode of Treatment individual therapy;physical therapy  -KM     Patient/Family/Caregiver Comments/Observations Pt. c/o fatigue throughout session  -KM     Row Name 22 1529          General Information    Patient Profile Reviewed yes  -KM     Existing Precautions/Restrictions fall;left;hip, posterior  -KM     Row Name 22 1529          Cognition/Psychosocial     Affect/Mental Status (Cognition) WFL;anxious  -KM     Follows Commands (Cognition) follows one-step commands;verbal cues/prompting required  -KM     Row Name 09/06/22 1529          Bed Mobility    Bed Mobility supine-sit;sit-supine  -KM     Supine-Sit [place] (Bed Mobility) contact guard  -KM     Assistive Device (Bed Mobility) bed rails  -KM     Row Name 09/06/22 1529          Transfer Assessment/Treatment    Transfers sit-stand transfer;bed-chair transfer;stand-sit transfer  -KM     Row Name 09/06/22 1529          Transfers    Bed-Chair [place] (Transfers) contact guard;minimum assist (75% patient effort)  -KM     Assistive Device (Bed-Chair Transfers) wheelchair  -KM     Sit-Stand [place] (Transfers) contact guard  -KM     Stand-Sit [place] (Transfers) contact guard  -KM     Row Name 09/06/22 1529          Sit-Stand Transfer    Assistive Device (Sit-Stand Transfers) wheelchair  -KM     Row Name 09/06/22 1529          Stand-Sit Transfer    Assistive Device (Stand-Sit Transfers) wheelchair  -KM     Row Name 09/06/22 1529          Gait/Stairs (Locomotion)    Gait/Stairs Locomotion gait/ambulation independence;distance ambulated  -KM     [place] Level (Gait) contact guard;verbal cues  -KM     Assistive Device (Gait) walker, front-wheeled  -KM     Distance in Feet (Gait) 60  -KM     Pattern (Gait) step-to  -KM     Deviations/Abnormal Patterns (Gait) antalgic;ifeanyi decreased;gait speed decreased;stride length decreased;weight shifting decreased  -KM     Bilateral Gait Deviations forward flexed posture  -KM     Row Name 09/06/22 1529          Safety Issues, Functional Mobility    Impairments Affecting Function (Mobility) balance;endurance/activity tolerance;pain;postural/trunk control;range of motion (ROM);strength  -KM     Row Name 09/06/22 1529          Motor Skills    Therapeutic Exercise hip;knee;ankle  -KM     Row Name 09/06/22 1529          Hip (Therapeutic Exercise)    Hip (Therapeutic  Exercise) AROM (active range of motion)  -     Hip AROM (Therapeutic Exercise) bilateral;flexion;aBduction;aDduction  -     Row Name 09/06/22 1529          Knee (Therapeutic Exercise)    Knee (Therapeutic Exercise) AROM (active range of motion)  -     Knee AROM (Therapeutic Exercise) bilateral;flexion;extension  -     Row Name 09/06/22 1529          Ankle (Therapeutic Exercise)    Ankle (Therapeutic Exercise) AROM (active range of motion)  -     Ankle AROM (Therapeutic Exercise) bilateral;dorsiflexion;plantarflexion  -     Row Name 09/06/22 1529          Daily Progress Summary (PT)    Daily Progress Summary (PT) Pt. demonstrates functional mobility skills at same level as prior session. Pt. continues to c/o fatigue throughout PT sessions and takes long breaks between activities. Pt. was able to ambulate an increased distance before needing to stop for shortness of breath. Pt. would benefit from continued skilled PT services.  -KM     Impairments Still Limiting Function (PT) functional activity tolerance impairment;pain;strength deficit  -KM           User Key  (r) = Recorded By, (t) = Taken By, (c) = Cosigned By    Initials Name Provider Type     Sergey Finch, PT Physical Therapist              Wound 08/18/22 2300 Left palm Skin Tear (Active)   Dressing Appearance dry;intact 09/06/22 0705   Closure None 09/05/22 1920   Base clean;dry;pink;red 09/06/22 1008   Drainage Amount none 09/05/22 1920   Care, Wound antimicrobial agent applied 09/06/22 1008   Dressing Care dressing changed 09/06/22 1008       Wound Left anterior greater trochanter (Active)   Dressing Appearance dry;intact 09/06/22 0705     Physical Therapy Education                 Title: PT OT SLP Therapies (Done)     Topic: Physical Therapy (Done)     Point: Mobility training (Done)     Learning Progress Summary           Patient Acceptance, E,TB, VU by  at 9/2/2022 1540      Show all documentation for this point (6)                 Point:  Home exercise program (Done)     Learning Progress Summary           Patient Acceptance, E,TB, VU by  at 9/2/2022 1540      Show all documentation for this point (6)                 Point: Body mechanics (Done)     Learning Progress Summary           Patient Acceptance, E,TB, VU by  at 9/2/2022 1540      Show all documentation for this point (6)                 Point: Precautions (Done)     Learning Progress Summary           Patient Acceptance, E,TB, VU by  at 9/2/2022 1540      Show all documentation for this point (6)                             User Key     Initials Effective Dates Name Provider Type Discipline     06/16/21 -  Keshia Douglass, PTA Physical Therapist Assistant PT                PT Recommendation and Plan          Daily Progress Summary (PT)  Functional Goal Overall Progress (PT): progressing toward functional goals slower than expected  Daily Progress Summary (PT): Pt. demonstrates functional mobility skills at same level as prior session. Pt. continues to c/o fatigue throughout PT sessions and takes long breaks between activities. Pt. was able to ambulate an increased distance before needing to stop for shortness of breath. Pt. would benefit from continued skilled PT services.  Impairments Still Limiting Function (PT): functional activity tolerance impairment, pain, strength deficit               Time Calculation:      PT Charges     Row Name 09/06/22 1528 09/06/22 1527          Time Calculation    Start Time 1445  -KM 0755  -KM     Stop Time 1455  -KM 0915  -KM     Time Calculation (min) 10 min  -KM 80 min  -KM     PT Received On -- 09/06/22  -KM            Time Calculation- PT    Total Timed Code Minutes- PT 10 minute(s)  -KM 80 minute(s)  -KM           User Key  (r) = Recorded By, (t) = Taken By, (c) = Cosigned By    Initials Name Provider Type    Sergey Rajan, PT Physical Therapist                Therapy Charges for Today     Code Description Service Date Service Provider  Modifiers Qty    55031451746 HC GAIT TRAINING EA 15 MIN 9/5/2022 Sergey Finch, PT GP 1    69367812286 HC PT THER PROC EA 15 MIN 9/5/2022 Sergey Finch, PT GP 3    09941581922 HC PT THERAPEUTIC ACT EA 15 MIN 9/5/2022 Sergey Finch, PT GP 2    55863880646 HC GAIT TRAINING EA 15 MIN 9/6/2022 Sergey Finch, PT GP 1    82704044192 HC PT THER PROC EA 15 MIN 9/6/2022 Sergey Finch, PT GP 3    51552213433 HC PT THERAPEUTIC ACT EA 15 MIN 9/6/2022 Sergey Finch, PT GP 2            PT G-[place]  AM-PAC 6 Clicks Score (PT): 6      Sergey Finch, PT  9/6/2022

## 2022-09-06 NOTE — PLAN OF CARE
Goal Outcome Evaluation:  Plan of Care Reviewed With: patient        Progress: improving  Outcome Evaluation: patient has had therapies this shift. prn meds given for pain and nausea. continue plan of care.

## 2022-09-06 NOTE — PROGRESS NOTES
Occupational Therapy: Individual: 90 minutes.    Physical Therapy:    Speech Language Pathology:    Signed by: Shital Roper OT

## 2022-09-06 NOTE — PROGRESS NOTES
[facility]  PROGRESS NOTE     Patient Identification:  Name:  Eloina Batista  Age:  82 y.o.  Sex:  female  :  1940  MRN:  [id number]  Visit Number:  [id number]  ROOM: [facility]     Primary Care Provider:  Alex Og MD    Length of stay in inpatient status:  12    Subjective     Chief Compliant:  No chief complaint on file.      History of Presenting Illness: 82-year-old female who is status post left hip fracture with repair.  Patient states she is improving every day at this time.  Has no new complaints.    Objective     Current Hospital Meds:apixaban, 5 mg, Oral, Q12H  atorvastatin, 80 mg, Oral, Nightly  calcium carbonate (oyster shell), 500 mg, Oral, Daily  cetirizine, 10 mg, Oral, Daily  clopidogrel, 75 mg, Oral, Daily  estrogens (conjugated), 0.3 mg, Oral, Daily  famotidine, 20 mg, Oral, BID AC  metoprolol succinate XL, 125 mg, Oral, Q24H  multivitamin, 1 tablet, Oral, Daily  neomycin-bacitracin-polymyxin, 1 application, Topical, Q12H  polyethylene glycol, 17 g, Oral, Daily  valsartan, 80 mg, Oral, Q24H  vitamin B-12, 1,000 mcg, Oral, Daily  vitamin B-6, 25 mg, Oral, TID  vitamin D3, 5,000 Units, Oral, Daily  vitamin E, 400 Units, Oral, BID       ----------------------------------------------------------------------------------------------------------------------  Vital Signs:  Temp:  [98 °F (36.7 °C)-98.1 °F (36.7 °C)] 98.1 °F (36.7 °C)  Heart Rate:  [85-92] 92  Resp:  [16-18] 16  BP: (127-140)/(79-89) 127/79  SpO2:  [97 %-99 %] 99 %  on   ;   Device (Oxygen Therapy): room air  Body mass index is 28.29 kg/m².    Wt Readings from Last 3 Encounters:   22 77.1 kg (170 lb)   22 77.5 kg (170 lb 13.7 oz)   21 77.1 kg (170 lb)     Intake & Output (last 3 days)        0701   0700  07 07 0701   0700  0701   0700    P.O. 1320 1320 1200 240    Total Intake(mL/kg) 1320 (17.1) 1320 (17.1) 1200 (15.6) 240 (3.1)    Urine (mL/kg/hr)  1400 (0.8) 1550 (0.8) 800 (0.4)     Stool  0      Total Output 1400 1550 800     Net -80 -230 +400 +240            Urine Unmeasured Occurrence   4 x 1 x    Stool Unmeasured Occurrence  1 x  1 x        Diet Regular  ----------------------------------------------------------------------------------------------------------------------  Physical exam:  Constitutional:   No acute distress  HEENT: Normocephalic atraumatic  Neck: Supple   Cardiovascular: Irregular irregular  Pulmonary/Chest: Clear to auscultation  Abdominal: Positive bowel sounds soft.   Musculoskeletal: Status post left hip repair  Neurological: No focal deficits  Skin: No rash  Peripheral vascular:  Genitourinary:  ----------------------------------------------------------------------------------------------------------------------    Last echocardiogram:  Results for orders placed during the hospital encounter of 08/18/22    Adult Transthoracic Echo Complete W/ Cont if Necessary Per Protocol    Interpretation Summary  · Estimated left ventricular EF = 66% Left ventricular ejection fraction appears to be 66 - 70%. Left ventricular systolic function is hyperdynamic (EF > 70%).  · Mild to moderate aortic valve regurgitation is present.  · Left ventricular wall thickness is consistent with hypertrophy.  · Left ventricular diastolic function was indeterminate.  · There is calcification of the aortic valve.  · Estimated right ventricular systolic pressure from tricuspid regurgitation is normal (<35 mmHg). Calculated right ventricular systolic pressure from tricuspid regurgitation is 33 mmHg.    ----------------------------------------------------------------------------------------------------------------------  Results from last 7 days   Lab Units 09/04/22  0032 09/02/22  0721   WBC 10*3/mm3 5.40 9.64   HEMOGLOBIN g/dL 10.2* 9.4*   HEMATOCRIT % 33.3* 30.7*   MCV fL 101.5* 102.7*   MCHC g/dL 30.6* 30.6*   PLATELETS 10*3/mm3 284 286         Results from last 7  days   Lab Units 09/04/22  0032 09/02/22  0728   SODIUM mmol/L 135* 137   POTASSIUM mmol/L 4.1 4.2   CHLORIDE mmol/L 104 103   CO2 mmol/L 21.4* 23.5   BUN mg/dL 10 11   CREATININE mg/dL 0.94 0.96   CALCIUM mg/dL 9.6 9.4   GLUCOSE mg/dL 108* 119*   ALBUMIN g/dL 2.78* 2.61*   BILIRUBIN mg/dL 1.1 1.3*   ALK PHOS U/L 111 107   AST (SGOT) U/L 35* 36*   ALT (SGPT) U/L 26 24   Estimated Creatinine Clearance: 48.2 mL/min (by C-G formula based on SCr of 0.94 mg/dL).  No results found for: AMMONIA  Results from last 7 days   Lab Units 09/02/22  0728   CK TOTAL U/L 43             No results found for: HGBA1C, POCGLU  Lab Results   Component Value Date    TSH 2.170 09/03/2022     No results found for: PREGTESTUR, PREGSERUM, HCG, HCGQUANT  Pain Management Panel    There is no flowsheet data to display.       Brief Urine Lab Results  (Last result in the past 365 days)      Color   Clarity   Blood   Leuk Est   Nitrite   Protein   CREAT   Urine HCG        08/21/22 0805 Yellow   Clear   Negative   Moderate (2+)   Negative   Negative               No results found for: BLOODCX      No results found for: URINECX  No results found for: WOUNDCX  No results found for: STOOLCX        I have personally looked at the labs and they are summarized above.  ----------------------------------------------------------------------------------------------------------------------  Detailed radiology reports for the last 24 hours:    Imaging Results (Last 24 Hours)     ** No results found for the last 24 hours. **        Final impressions for the last 30 days of radiology reports:    XR Hand 2 View Left    Result Date: 8/28/2022    No acute findings in the left hand.  This report was finalized on 8/28/2022 12:17 PM by Dr. Joshua Chaudhry MD.      XR Femur 2 View Left    Result Date: 8/20/2022  Left hip arthroplasty noted in place. There is no evidence of additional, previously unidentified fracture or dislocation. The knee joint is partially imaged and  appears normally aligned.  This report was finalized on 8/20/2022 9:13 AM by Aristeo Duvall.      CT Pelvis Without Contrast    Result Date: 8/18/2022  Acute noncomminuted subcapital neck fracture of left proximal femur. Associated, relatively mild diffuse subcutaneous bruising. No evidence of acute trauma elsewhere.  This report was finalized on 8/18/2022 11:36 PM by Dr. Vinayak Birch MD.      XR Chest 1 View    Result Date: 8/21/2022  No acute cardiopulmonary abnormality. Electronically signed by:  Jose Schulz M.D.  8/21/2022 5:46 AM Mountain Time    XR Chest 1 View    Result Date: 8/19/2022  1.  No evidence for acute cardiopulmonary process.  This report was finalized on 8/19/2022 8:54 AM by Jeramy Mejia MD.      CT Lower Extremity Left Without Contrast    Result Date: 8/21/2022  1.  Status post left hip arthroplasty. No evidence of periprosthetic fracture within limitations of artifact from the arthroplasty. Electronically signed by:  Roger Rucker M.D.  8/21/2022 12:08 AM Mountain Time    CT Lower Extremity Left Without Contrast    Result Date: 8/20/2022  No evidence of acute fracture. Arthroplasty hardware appears in appropriate position. There is a large hematoma present posterior to the femoral component and deep to the gluteus musculature, measuring 8.8 x 4.8 x 5.5 cm.  This report was finalized on 8/20/2022 3:06 PM by Aristeo Duvall.      XR Spine Lumbar Complete 4+VW    Result Date: 8/18/2022  Arthritic change, but no acute bony abnormality  This report was finalized on 8/18/2022 7:59 AM by Dr. Joshua Chaudhry MD.      XR Hip With or Without Pelvis 1 View Left    Result Date: 8/20/2022  Postarthroplasty changes without identified complication. Electronically signed by:  Jose Schulz M.D.  8/20/2022 5:02 AM Mountain Time    XR Hip With or Without Pelvis 2 - 3 View Left    Result Date: 9/1/2022    Left hip prosthesis. Alignment anatomic.  This report was finalized on 9/1/2022 11:41 AM by Dr. Lewis  MD Richa.      XR Hip With or Without Pelvis 2 - 3 View Left    Result Date: 8/20/2022  Postsurgical findings of interval left total hip arthroplasty without evidence of immediate hardware complication. Expected soft tissue edema and subcutaneous emphysema.    This report was finalized on 8/20/2022 5:54 AM by Aristeo Duvall.      XR Hips Bilateral With or Without Pelvis 5 View    Result Date: 8/18/2022  Mild arthritic change.  This report was finalized on 8/18/2022 9:31 AM by Dr. Joshua Chaudhry MD.      I have personally looked at the radiology images and read the final radiology report.    Assessment & Plan    Status post left hip fracture with ORIF--patient requiring set up for upper body dressing maximum assistance for lower body dressing; set up for grooming; minimum assist for toileting.  Requiring contact-guard for transfers; minimum assist for bed mobility; ambulated 40 feet yesterday with front wheel walker and contact-guard    Atrial fibrillation continue Toprol- mg a day and Eliquis 5 mg twice daily    CAD stable.  Continue high-dose statin therapy and Plavix 75 mg a day    Hypertension continue metoprolol and valsartan    Anemia stable    VTE Prophylaxis:   Mechanical Order History:     None      Pharmalogical Order History:      Ordered     Dose Route Frequency Stop    08/25/22 2136  apixaban (ELIQUIS) tablet 5 mg         5 mg PO Every 12 Hours Scheduled --                    Jose Fatima MD  Psychiatric Hospitalist  09/06/22  11:21 EDT

## 2022-09-06 NOTE — PROGRESS NOTES
Patient Identification:  Name:  Eloina Batista  Age:  82 y.o.  Sex:  female  :  1940  MRN:  [id number]   Visit Number:  [id number]  Primary Care Physician:  Alex Og MD     Subjective     Chief complaint:     Wound left hand     History of presenting illness:   Patient is a 82 y.o. female who we are consulted for wound to the LOCATION: left hand, palmar surface. CONTEXT/ONSET: traumatic. Reported fell at home. Laceration/skin tear to the area. Exact date unknown. ASSOCIATED SIGNS AND SYMPTOMS: none reported. No other acute issues reported other than some pain to the area. No change to quality or intensity of pain     Interval History:  2022: Patient seen in resting in bed. Wound remains present to left hand. Denies any new issues or concerns. Tolerating current treatment without concerns. Vital signs stable, afebrile.   ---------------------------------------------------------------------------------------------------------------------   Review of Systems:  Review of Systems   Constitutional: Negative for chills and fever.   Respiratory: Negative for cough and shortness of breath.    Cardiovascular: Negative for leg swelling.   Gastrointestinal: Negative for nausea and vomiting.   Musculoskeletal: Positive for back pain and gait problem.   Skin: Positive for wound.   Neurological: Negative for weakness.   Hematological: Does not bruise/bleed easily.     ---------------------------------------------------------------------------------------------------------------------   Past Medical History:   Diagnosis Date   • Arthritis    • Coronary artery disease    • GERD (gastroesophageal reflux disease)    • Hyperlipidemia    • Hypertension      Past Surgical History:   Procedure Laterality Date   • APPENDECTOMY     • BLADDER REPAIR     • CARDIAC CATHETERIZATION     • CARDIAC CATHETERIZATION N/A 3/22/2017    Procedure: Left Heart Cath;  Surgeon: Alex Blanco MD;  Location: [facility] INVASIVE  LOCATION;  Service:    • CARDIAC SURGERY     • CHOLECYSTECTOMY     • CORONARY ARTERY BYPASS GRAFT     • CORONARY STENT PLACEMENT     • HERNIA REPAIR     • HIP HEMIARTHROPLASTY Left 8/19/2022    Procedure: HIP HEMIARTHROPLASTY LEFT;  Surgeon: Jeramy Reid Jr., MD;  Location: [facility];  Service: Orthopedics;  Laterality: Left;   • HYSTERECTOMY       Family History   Problem Relation Age of Onset   • Heart attack Mother    • Anuerysm Father    • Heart attack Sister    • Heart disease Sister    • Heart disease Brother      Social History     Socioeconomic History   • Marital status:    Tobacco Use   • Smoking status: Never Smoker   • Smokeless tobacco: Never Used   Substance and Sexual Activity   • Alcohol use: No   • Drug use: No   • Sexual activity: Defer     ---------------------------------------------------------------------------------------------------------------------   Allergies:  Clindamycin/lincomycin, Doxycycline, Latex, Penicillins, and Sulfa antibiotics  ---------------------------------------------------------------------------------------------------------------------   Medications below are reported home medications pulling from within the system; at this time, these medications have not been reconciled unless otherwise specified and are in the verification process for further verifcation as current home medications.    Prior to Admission Medications     Prescriptions Last Dose Informant Patient Reported? Taking?    amLODIPine (NORVASC) 5 MG tablet Unknown Child Yes No    Take 5 mg by mouth Every Night.    aspirin 81 MG EC tablet Unknown Child Yes No    Take 81 mg by mouth Every Night.    atorvastatin (LIPITOR) 80 MG tablet Unknown Child Yes No    Take 80 mg by mouth Every Night.    cetirizine (zyrTEC) 10 MG tablet Unknown Child Yes No    Take 10 mg by mouth Daily.    cholecalciferol (VITAMIN D3) 25 MCG (1000 UT) tablet Unknown Child Yes No    Take 1,000 Units by mouth Daily With Lunch.     clopidogrel (PLAVIX) 75 MG tablet Unknown Child Yes No    Take 75 mg by mouth Daily.    cycloSPORINE (RESTASIS) 0.05 % ophthalmic emulsion Unknown Child Yes No    Administer 1 drop to both eyes 2 (Two) Times a Day.    famotidine (PEPCID) 20 MG tablet Unknown Child Yes No    Take 20 mg by mouth 2 (Two) Times a Day.    gabapentin (NEURONTIN) 100 MG capsule Unknown Child Yes No    Take 100 mg by mouth Every Night.    isosorbide mononitrate (IMDUR) 120 MG 24 hr tablet Unknown Child Yes No    Take 60 mg by mouth Daily.    metoprolol succinate XL (TOPROL-XL) 100 MG 24 hr tablet Unknown Child Yes No    Take 100 mg by mouth Daily. am    metoprolol succinate XL (TOPROL-XL) 100 MG 24 hr tablet Unknown Child Yes No    Take 50 mg by mouth Every Night.    ranolazine (RANEXA) 500 MG 12 hr tablet Unknown Child Yes No    Take 500 mg by mouth 2 (Two) Times a Day.    traMADol (ULTRAM) 50 MG tablet Unknown Child Yes No    Take 100 mg by mouth 2 (Two) Times a Day As Needed for Moderate Pain .    traMADol (ULTRAM) 50 MG tablet Unknown Child Yes No    Take 50 mg by mouth Every Night.    valsartan (DIOVAN) 320 MG tablet Unknown Child Yes No    Take 320 mg by mouth Daily.    vitamin B-12 (CYANOCOBALAMIN) 1000 MCG tablet Unknown Child Yes No    Take 1,000 mcg by mouth Daily With Lunch.    Vitamin E 180 MG (400 UNIT) capsule capsule Unknown Child Yes No    Take 1 capsule by mouth 2 (Two) Times a Day.        ---------------------------------------------------------------------------------------------------------------------  Objective     Hospital Scheduled Meds:  apixaban, 5 mg, Oral, Q12H  atorvastatin, 80 mg, Oral, Nightly  calcium carbonate (oyster shell), 500 mg, Oral, Daily  cetirizine, 10 mg, Oral, Daily  clopidogrel, 75 mg, Oral, Daily  estrogens (conjugated), 0.3 mg, Oral, Daily  famotidine, 20 mg, Oral, BID AC  metoprolol succinate XL, 125 mg, Oral, Q24H  multivitamin, 1 tablet, Oral, Daily  neomycin-bacitracin-polymyxin, 1  application, Topical, Q12H  polyethylene glycol, 17 g, Oral, Daily  valsartan, 80 mg, Oral, Q24H  vitamin B-12, 1,000 mcg, Oral, Daily  vitamin B-6, 25 mg, Oral, TID  vitamin D3, 5,000 Units, Oral, Daily  vitamin E, 400 Units, Oral, BID           Current listed hospital scheduled medications may not yet reflect those currently placed in orders that are signed and held, awaiting patient's arrival to floor/unit.    ---------------------------------------------------------------------------------------------------------------------   Vital Signs:  Temp:  [98 °F (36.7 °C)-98.1 °F (36.7 °C)] 98.1 °F (36.7 °C)  Heart Rate:  [85-92] 92  Resp:  [16-18] 16  BP: (127-140)/(79-89) 127/79  No data found.  SpO2 Percentage    09/05/22 0730 09/05/22 1907 09/06/22 0715   SpO2: 94% 97% 99%     SpO2:  [97 %-99 %] 99 %  on   ;   Device (Oxygen Therapy): room air    Body mass index is 28.29 kg/m².  Wt Readings from Last 3 Encounters:   08/25/22 77.1 kg (170 lb)   08/21/22 77.5 kg (170 lb 13.7 oz)   04/13/21 77.1 kg (170 lb)     ---------------------------------------------------------------------------------------------------------------------   Physical Exam:  Physical Exam  Constitutional: Vital sign were reviewed (temperature, pulse, respiration, and blood pressure) and found to be within expected limits, general appearance was assessed and the patient was found to be in no distress and calm and comfortable appears  Respiratory: Normal respiratory effort and symmetrical chest expansion  Left palm. Thenar eminence. Brusing noted. Skin tear/laceration noted. No purulence or malodor noted. Tender to palpation of the area   ---------------------------------------------------------------------------------------------------------------------   Results from last 7 days   Lab Units 09/02/22  0728   CK TOTAL U/L 43           Results from last 7 days   Lab Units 09/04/22  0032 09/02/22  0728   WBC 10*3/mm3 5.40 9.64   HEMOGLOBIN g/dL 10.2*  9.4*   HEMATOCRIT % 33.3* 30.7*   MCV fL 101.5* 102.7*   MCHC g/dL 30.6* 30.6*   PLATELETS 10*3/mm3 284 286     Results from last 7 days   Lab Units 09/04/22  0032 09/02/22  0728   SODIUM mmol/L 135* 137   POTASSIUM mmol/L 4.1 4.2   CHLORIDE mmol/L 104 103   CO2 mmol/L 21.4* 23.5   BUN mg/dL 10 11   CREATININE mg/dL 0.94 0.96   CALCIUM mg/dL 9.6 9.4   GLUCOSE mg/dL 108* 119*   ALBUMIN g/dL 2.78* 2.61*   BILIRUBIN mg/dL 1.1 1.3*   ALK PHOS U/L 111 107   AST (SGOT) U/L 35* 36*   ALT (SGPT) U/L 26 24   Estimated Creatinine Clearance: 48.2 mL/min (by C-G formula based on SCr of 0.94 mg/dL).  No results found for: AMMONIA    No results found for: HGBA1C, POCGLU  Lab Results   Component Value Date    HGBA1C 5.80 (H) 03/22/2017     Lab Results   Component Value Date    TSH 2.170 09/03/2022       No results found for: BLOODCX  No results found for: URINECX  No results found for: WOUNDCX  No results found for: STOOLCX  No results found for: RESPCX  No results found for: MRSACX  Pain Management Panel    There is no flowsheet data to display.       I have personally reviewed the above laboratory results.   ---------------------------------------------------------------------------------------------------------------------    Assessment & Plan      Recommend adequate hydration, along with protein intake. Open wounds can serve as a nidus for local and systemic infection. Needs monitoring.      #Traumatic laceration left hand- triple antibiotic to area BID to provide a level of local antibacterial coverage and donate moisture.    ALLEY Cadena   [facility]  09/06/22  12:09 EDT

## 2022-09-06 NOTE — THERAPY TREATMENT NOTE
Inpatient Rehabilitation - Occupational Therapy Treatment Note    [facility]     Patient Name: Eloina Batista  : 1940  MRN: [id number]    Today's Date: 2022                 Admit Date: 2022       No diagnosis found.    Patient Active Problem List   Diagnosis   • Chest pain   • CAD (coronary artery disease)   • Essential hypertension   • Hyperlipidemia   • Sebaceous cyst   • Left displaced femoral neck fracture (HCC)   • Fall, initial encounter   • Atrial fibrillation with rapid ventricular response (HCC)   • Stage 3a chronic kidney disease (HCC)   • S/p left hip fracture       Past Medical History:   Diagnosis Date   • Arthritis    • Coronary artery disease    • GERD (gastroesophageal reflux disease)    • Hyperlipidemia    • Hypertension        Past Surgical History:   Procedure Laterality Date   • APPENDECTOMY     • BLADDER REPAIR     • CARDIAC CATHETERIZATION     • CARDIAC CATHETERIZATION N/A 3/22/2017    Procedure: Left Heart Cath;  Surgeon: Alex Blanco MD;  Location: [facility] INVASIVE LOCATION;  Service:    • CARDIAC SURGERY     • CHOLECYSTECTOMY     • CORONARY ARTERY BYPASS GRAFT     • CORONARY STENT PLACEMENT     • HERNIA REPAIR     • HIP HEMIARTHROPLASTY Left 2022    Procedure: HIP HEMIARTHROPLASTY LEFT;  Surgeon: Jeramy Reid Jr., MD;  Location: [facility] OR;  Service: Orthopedics;  Laterality: Left;   • HYSTERECTOMY               IRF OT ASSESSMENT FLOWSHEET (last 12 hours)     IRF OT Evaluation and Treatment     Row Name 22 1219          OT Time and Intention    Document Type daily treatment  -HB     Mode of Treatment individual therapy;occupational therapy  -HB     Total Minutes, Occupational Therapy 90  -HB     Patient Effort adequate  -HB     Symptoms Noted During/After Treatment none  -HB     Row Name 22 1219          General Information    Patient Profile Reviewed yes  -HB     Patient/Family/Caregiver Comments/Observations Patient and RN okd OT this date.  -HB      General Observations of Patient Patient feeling nauseated and SOA from mask. RN informed. RN and pt agreeable to OT tx. Pt requested tx to be completed at bedside.  -HB     Existing Precautions/Restrictions fall;left;hip, posterior  -     Row Name 09/06/22 1219          Pain Assessment    Pretreatment Pain Rating 2/10  -HB     Posttreatment Pain Rating 2/10  -HB     Pain Location - Side/Orientation Left  -HB     Pain Location - hip  -     Row Name 09/06/22 1219          Cognition/Psychosocial    Affect/Mental Status (Cognition) WFL  -     Orientation Status (Cognition) oriented x 4  -HB     Follows Commands (Cognition) follows one-step commands;verbal cues/prompting required  -     Row Name 09/06/22 1219          Grooming    [place] Level (Grooming) grooming skills;set up  -     Position (Grooming) supported sitting  -     Row Name 09/06/22 1219          Bed Mobility    Sit-Supine [place] (Bed Mobility) verbal cues;nonverbal cues (demo/gesture);minimum assist (75% patient effort)  -     Row Name 09/06/22 1219          Transfers    Chair-Bed [place] (Transfers) minimum assist (75% patient effort);nonverbal cues (demo/gesture);verbal cues  -     Sit-Stand [place] (Transfers) minimum assist (75% patient effort);nonverbal cues (demo/gesture);verbal cues  -HB     Stand-Sit [place] (Transfers) contact guard;nonverbal cues (demo/gesture);verbal cues  -     Row Name 09/06/22 1219          Chair-Bed Transfer    Assistive Device (Chair-Bed Transfers) wheelchair  -     Row Name 09/06/22 1219          Sit-Stand Transfer    Assistive Device (Sit-Stand Transfers) wheelchair  -     Row Name 09/06/22 1219          Stand-Sit Transfer    Assistive Device (Stand-Sit Transfers) wheelchair  -     Row Name 09/06/22 1219          Motor Skills    Motor Skills coordination;functional endurance;motor control/coordination interventions  -     Motor Control/Coordination Interventions fine  motor manipulation/dexterity activities;therapeutic exercise/ROM  -     Therapeutic Exercise shoulder;elbow/forearm;hand;wrist  -     Additional Documentation --  BUE TA to increase ADL status and endurance  -     Row Name 09/06/22 1219          Positioning and Restraints    Pre-Treatment Position sitting in chair/recliner  -     Post Treatment Position bed  -     In Bed call light within reach;encouraged to call for assist  -           User Key  (r) = Recorded By, (t) = Taken By, (c) = Cosigned By    Initials Name Effective Dates     Shital Roper OT 05/25/21 -                  Occupational Therapy Education                 Title: PT OT SLP Therapies (Done)     Topic: Occupational Therapy (Done)     Point: ADL training (Done)     Description:   Instruct learner(s) on proper safety adaptation and remediation techniques during self care or transfers.   Instruct in proper use of assistive devices.              Learning Progress Summary           Patient Acceptance, E, VU,NR by  at 9/6/2022 1228      Show all documentation for this point (8)                 Point: Precautions (Done)     Description:   Instruct learner(s) on prescribed precautions during self-care and functional transfers.              Learning Progress Summary           Patient Acceptance, E, VU,NR by  at 9/6/2022 1228      Show all documentation for this point (8)                             User Key     Initials Effective Dates Name Provider Type Discipline     05/25/21 -  Shital Roper OT Occupational Therapist OT                    OT Recommendation and Plan                         Time Calculation:      Time Calculation- OT     Row Name 09/06/22 1229             Time Calculation- OT    OT Start Time 0915  -      OT Stop Time 1045  -      OT Time Calculation (min) 90 min  -      Total Timed Code Minutes- OT 90 minute(s)  -      OT Non-Billable Time (min) 10 min  -            User Key  (r) = Recorded By, (t) = Taken  By, (c) = Cosigned By    Initials Name Provider Type     Shital Roper OT Occupational Therapist              Therapy Charges for Today     Code Description Service Date Service Provider Modifiers Qty    58838514056 HC OT SELF CARE/MGMT/TRAIN EA 15 MIN 9/6/2022 Shital Roper OT GO 2    17641495686 HC OT THER PROC EA 15 MIN 9/6/2022 Shital Roper OT GO 1    79881851304 HC OT THERAPEUTIC ACT EA 15 MIN 9/6/2022 Shital Roper OT GO 3                   Shital Roper OT  9/6/2022

## 2022-09-06 NOTE — PROGRESS NOTES
Occupational Therapy:    Physical Therapy: Individual: 90 minutes.    Speech Language Pathology:    Signed by: Sergey Finch PT

## 2022-09-07 NOTE — PROGRESS NOTES
Rehabilitation Nursing  Inpatient Rehabilitation Plan of Care Note    Plan of Care  Copy from [facility]    Bed/Chair/Wheelchair (Active)  Current Status (8/25/2022 12:00:00 AM): Increase in physical activity  Weekly Goal: Joint mobility maintained  Discharge Goal: Demonstrate use of adaptive equipment.    Safety    Potential for Injury (Active)  Current Status (8/25/2022 12:00:00 AM): No falls this shift  Weekly Goal: No falls this hospital stay  Discharge Goal: Identy measures to prevent injury    Signed by: Gabriela Frye Nurse

## 2022-09-07 NOTE — PROGRESS NOTES
Occupational Therapy:    Physical Therapy: Individual: 100 minutes.    Speech Language Pathology:    Signed by: Roberta Sanchez PTA

## 2022-09-07 NOTE — PROGRESS NOTES
Occupational Therapy: Individual: 100 minutes.    Physical Therapy:    Speech Language Pathology:    Signed by: Karen Lombardi OT

## 2022-09-07 NOTE — SIGNIFICANT NOTE
09/07/22 0850   Plan   Plan Spoke to pt about plans for discharge on 9-9-22, recommendations for home health PT, OT, bedside commode and 20 inch standard wheelchair with swing away leg rests, anti-tippers, basic cushion, and how she is doing in therapy.  Pt prefers [facility] for DME and whoever spouse wants for home health.  DME can be delivered to her home.  Pt says spouse plans to hire someone to assist her with caregiving needs.  SS will follow-up with spouse today.   Patient/Family in Agreement with Plan yes   Provided Post Acute Provider List? Yes   Post Acute Provider List DME Supplier;Home Health   Delivered To Patient   Method of Delivery In person

## 2022-09-07 NOTE — SIGNIFICANT NOTE
09/07/22 1045   Plan   Plan Spoke to spouse [phone] about plans for discharge on 9-9-22, how pt is doing in therapy, recommendations for home health PT, OT, wheelchair, and bedside commode.  Spouse says pt will go to their son Jeramy's home at discharge until he gets a new bathroom installed on the main floor of their home.  Pt will sleep in an adjustable bed at son's home.  Spouse was concerned about pt having a bed on the main level of their home and asked about a hospital bed.  Spoke to PT who recommends hospital bed if pt/family want to use one.  Spouse will inform SS on 9-8-22 if he wants to get a hospital bed for pt when she comes home.  Spouse is working on getting pt's friend to stay with her 5-6 hours during the day but does not know yet if she will be able to stay.  Informed spouse about in-home caregiving agencies; SS provided brochures.  Spouse says he will stay with pt if he cannot get a caregiver.  [facility] preferred by spouse.   [facility] can contact spouse about delivery of DME.  Spouse says pt has his rolling walker to use at home.   Informed spouse he can bring RW to rehab at discharge for PT to adjust to pt's height.  Spouse to come to rehab for discharge around 11:00 am on 9-9-22; he will transport too.   Patient/Family in Agreement with Plan yes   Post Acute Provider List Home Health   Provided Post Acute Provider Quality & Resource List? Yes   Post Acute Provider Quality and Resource List Home Health   Delivered To Support Person   Support Person Trent Batista-spouse   Method of Delivery Telephone

## 2022-09-07 NOTE — THERAPY TREATMENT NOTE
Inpatient Rehabilitation - Physical Therapy Treatment Note       [facility]     Patient Name: Eloina Batista  : 1940  MRN: [id number]    Today's Date: 2022                    Admit Date: 2022      Visit Dx:   No diagnosis found.    Patient Active Problem List   Diagnosis   • Chest pain   • CAD (coronary artery disease)   • Essential hypertension   • Hyperlipidemia   • Sebaceous cyst   • Left displaced femoral neck fracture (HCC)   • Fall, initial encounter   • Atrial fibrillation with rapid ventricular response (HCC)   • Stage 3a chronic kidney disease (HCC)   • S/p left hip fracture       Past Medical History:   Diagnosis Date   • Arthritis    • Coronary artery disease    • GERD (gastroesophageal reflux disease)    • Hyperlipidemia    • Hypertension        Past Surgical History:   Procedure Laterality Date   • APPENDECTOMY     • BLADDER REPAIR     • CARDIAC CATHETERIZATION     • CARDIAC CATHETERIZATION N/A 3/22/2017    Procedure: Left Heart Cath;  Surgeon: Alex Blanco MD;  Location: [facility] INVASIVE LOCATION;  Service:    • CARDIAC SURGERY     • CHOLECYSTECTOMY     • CORONARY ARTERY BYPASS GRAFT     • CORONARY STENT PLACEMENT     • HERNIA REPAIR     • HIP HEMIARTHROPLASTY Left 2022    Procedure: HIP HEMIARTHROPLASTY LEFT;  Surgeon: Jeramy Reid Jr., MD;  Location: [facility] OR;  Service: Orthopedics;  Laterality: Left;   • HYSTERECTOMY         PT ASSESSMENT (last 12 hours)     IRF PT Evaluation and Treatment     Row Name 22 1432          PT Time and Intention    Document Type daily treatment  PM session  -LL     Mode of Treatment individual therapy;physical therapy  -LL     Patient/Family/Caregiver Comments/Observations Patient & RN in agreement for participation with PM PT session.  Patient requested no ambulation in PM.  -LL     Row Name 22 1438          General Information    Patient Profile Reviewed yes  -LL     Existing Precautions/Restrictions fall;left;hip,  posterior  -LL     Row Name 09/07/22 1432          Cognition/Psychosocial    Affect/Mental Status (Cognition) WFL  -LL     Orientation Status (Cognition) oriented x 4  -LL     Follows Commands (Cognition) follows one-step commands;verbal cues/prompting required  -LL     Personal Safety Interventions gait belt;nonskid shoes/slippers when out of bed  -LL     Cognitive Function WFL  -LL     Row Name 09/07/22 1432          Bed Mobility    Bed Mobility supine-sit;sit-supine  -LL     Supine-Sit [place] (Bed Mobility) contact guard  -LL     Sit-Supine [place] (Bed Mobility) minimum assist (75% patient effort)  w/ LE's  -LL     Assistive Device (Bed Mobility) bed rails  -LL     Row Name 09/07/22 1432          Transfer Assessment/Treatment    Transfers sit-stand transfer;bed-chair transfer;stand-sit transfer  -     Row Name 09/07/22 1432          Transfers    Chair-Bed [place] (Transfers) contact guard  -LL     Sit-Stand [place] (Transfers) contact guard  -LL     Stand-Sit [place] (Transfers) contact guard  -LL     [place] Level (Toilet Transfer) contact guard;verbal cues;nonverbal cues (demo/gesture)  -     Assistive Device (Toilet Transfer) grab bars/safety frame;wheelchair  -LL     Row Name 09/07/22 1432          Chair-Bed Transfer    Assistive Device (Chair-Bed Transfers) wheelchair  bedrail  -LL     Row Name 09/07/22 1432          Sit-Stand Transfer    Assistive Device (Sit-Stand Transfers) wheelchair  -LL     Row Name 09/07/22 1432          Stand-Sit Transfer    Assistive Device (Stand-Sit Transfers) wheelchair  -LL     Row Name 09/07/22 1432          Toilet Transfer    Type (Toilet Transfer) stand pivot/stand step  -LL     Row Name 09/07/22 1432          Gait/Stairs (Locomotion)    Comment, (Gait/Stairs) Requested no ambulation in PM  -LL     Row Name 09/07/22 1432          Safety Issues, Functional Mobility    Impairments Affecting Function (Mobility) balance;endurance/activity  tolerance;pain;postural/trunk control;range of motion (ROM);strength  -LL     Row Name 09/07/22 1432          Hip (Therapeutic Exercise)    Hip Strengthening (Therapeutic Exercise) bilateral;flexion;extension;aBduction;aDduction;marching while seated;sitting;resistance band;green  -     Row Name 09/07/22 1432          Knee (Therapeutic Exercise)    Knee Strengthening (Therapeutic Exercise) bilateral;flexion;extension;marching while seated;sitting;resistance band;green  -     Row Name 09/07/22 1432          Ankle (Therapeutic Exercise)    Ankle Strengthening (Therapeutic Exercise) bilateral;dorsiflexion;plantarflexion;sitting  -     Row Name 09/07/22 1432          Positioning and Restraints    Pre-Treatment Position --  WC w/ OT  -LL     Post Treatment Position bed  -LL     In Bed supine;call light within reach;encouraged to call for assist;side rails up x2;RUE elevated;LUE elevated;legs elevated  -     Row Name 09/07/22 1432          Daily Progress Summary (PT)    Impairments Still Limiting Function (PT) functional activity tolerance impairment;pain;strength deficit  -           User Key  (r) = Recorded By, (t) = Taken By, (c) = Cosigned By    Initials Name Provider Type     Roberta Sanchez PTA Physical Therapist Assistant              Wound 08/18/22 2300 Left palm Skin Tear (Active)   Dressing Appearance open to air 09/07/22 0848   Closure None 09/07/22 0848   Base clean;dry;pink;red 09/07/22 0848   Drainage Amount none 09/07/22 0848   Care, Wound antimicrobial agent applied 09/07/22 0848   Dressing Care dressing changed 09/07/22 0848       Wound Left anterior greater trochanter (Active)   Dressing Appearance dry;intact 09/07/22 0848     Physical Therapy Education                 Title: PT OT SLP Therapies (Done)     Topic: Physical Therapy (Done)     Point: Mobility training (Done)     Learning Progress Summary           Patient Acceptance, E,D, VU,NR by  at 9/7/2022 1438      Show all documentation  for this point (7)                 Point: Home exercise program (Done)     Learning Progress Summary           Patient Acceptance, E,D, VU,NR by  at 9/7/2022 1438      Show all documentation for this point (7)                 Point: Body mechanics (Done)     Learning Progress Summary           Patient Acceptance, E,D, VU,NR by  at 9/7/2022 1438      Show all documentation for this point (7)                 Point: Precautions (Done)     Learning Progress Summary           Patient Acceptance, E,D, VU,NR by  at 9/7/2022 1438      Show all documentation for this point (7)                             User Key     Initials Effective Dates Name Provider Type Discipline     05/02/16 -  Roberta Sanchez PTA Physical Therapist Assistant PT                PT Recommendation and Plan          Daily Progress Summary (PT)  Impairments Still Limiting Function (PT): functional activity tolerance impairment, pain, strength deficit               Time Calculation:      PT Charges     Row Name 09/07/22 1438             Time Calculation    Start Time 1330  -LL      Stop Time 1425  -LL      Time Calculation (min) 55 min  -LL      PT Received On 09/07/22  -              Time Calculation- PT    Total Timed Code Minutes- PT 55 minute(s)  -LL            User Key  (r) = Recorded By, (t) = Taken By, (c) = Cosigned By    Initials Name Provider Type    LL Roberta Sanchez PTA Physical Therapist Assistant                Therapy Charges for Today     Code Description Service Date Service Provider Modifiers Qty    96603764376 HC PT THERAPEUTIC ACT EA 15 MIN 9/7/2022 Roberta Sanchez PTA GP, CQ 2    72894535293 HC PT THER PROC EA 15 MIN 9/7/2022 Roberta Sanchez PTA GP, CQ 2            PT G-Codes  AM-PAC 6 Clicks Score (PT): 6      Mirtha Sanchez PTA  9/7/2022

## 2022-09-07 NOTE — SIGNIFICANT NOTE
09/06/22 1853   Plan   Plan Team conference held.  Pt is recommended for discharge on 9-9-22 with home health PT/OT, bedside commode and 20 inch standard wheelchair with swing away leg rests, basic cushion, and anti-tippers.

## 2022-09-07 NOTE — PROGRESS NOTES
Case Management  Inpatient Rehabilitation Team Conference    Conference Date/Time: 9/6/2022 6:52:25 AM    Team Conference Attendees:  MD Eloina Larkin SW Jessica Bill, ARISTEO,   ARISTEO Moffett, PT  Shital Roper OT    Demographics            Age: 82Y            Gender: Female    Admission Date: 8/25/2022 9:31:00 PM  Rehabilitation Diagnosis:  left hip hemiarthroplasty  Comorbidities: D62 Acute posthemorrhagic anemia  I10 Essential (primary) hypertension  I25.10 Atherosclerotic heart disease of native coronary artery without angina  pectoris  I48.91 Unspecified atrial fibrillation  Z79.01 Long term (current) use of anticoagulants  Z95.1 Presence of aortocoronary bypass graft  Z95.5 Presence of coronary angioplasty implant and graft  Z79.899 Other long term (current) drug therapy  M19.90 Unspecified osteoarthritis, unspecified site  Z79.02 Long term (current) use of antithrombotics/antiplatelets  E78.5 Hyperlipidemia, unspecified  K21.9 Gastro-esophageal reflux disease without esophagitis  W19.XXXA Unspecified fall, initial encounter  Y93.9 Activity, unspecified  Y92.009 Unspecified place in unspecified non-institutional (private) residence  as the place of occurrence of the external cause      Plan of Care  Anticipated Discharge Date/Estimated Length of Stay: 9-9-22  Anticipated Discharge Destination: Community discharge with assistance  Discharge Plan : Pt plans to return home with spouse at discharge.  Spouse and  family will assist with caregiving needs.  Medical Necessity Expected Level Rationale: fair  Intensity and Duration: an average of 3 hours/5 days per week  Medical Supervision and 24 Hour Rehab Nursing: x  Physical Therapy: x  PT Intensity/Duration: PT 1.5 hours per day/5 days per week  Occupational Therapy: x  OT Intensity/Duration: OT 1.5 hours per day/5 days per week  Social Work: x  Therapeutic Recreation: x  Updated (if changes indicated)    Anticipated  Discharge Date/Estimated Length of Stay:   9-9-22      Discharge Plan of Care: Home Health Services Physical Therapy strengthening,  endurance, gait training, transfer training, balance, therapeutic exercise, bed  mobility, home safety, functional transfers, steps/stairs 3 times per week for 4  weeks Occupational Therapy ADL re-training, home safety, functional mobility,  strengthening,  endurance 3 times per week for 4 weeks Ambulatory: walker with  wheels Commodes: Bedside commode Wheelchairs: Standard wheelchair  Size:  20 inch anti-tippers, basic cushion    Based on the patient's medical and functional status, their prognosis and  expected level of functional improvement is: good      Interdisciplinary Problem/Goals/Status  Mobility    [RN] Bed/Chair/Wheelchair(Active)  Current Status(08/25/2022): Increase in physical activity  Weekly Goal(10/08/2022): Joint mobility maintained  Discharge Goal: Demonstrate use of adaptive equipment.    [PT] Bed/Chair/Wheelchair(Active)  Current Status(08/26/2022): max A  Weekly Goal(09/02/2022): min A  Discharge Goal: Sup    [PT] Walk(Active)  Current Status(08/26/2022): unable  Weekly Goal(09/02/2022): amb 50' RW min A  Discharge Goal: amb 300' RW Sup        Safety    [RN] Potential for Injury(Active)  Current Status(08/25/2022): No falls this shift  Weekly Goal(10/08/2022): No falls this hospital stay  Discharge Goal: Identy measures to prevent injury        Self Care    [OT] Dressing (Upper)(Resolved)  Current Status(09/05/2022): setup  Weekly Goal(09/06/2022): setup  Discharge Goal: setup    [OT] Dressing (Lower)(Active)  Current Status(09/05/2022): maxA  Weekly Goal(09/13/2022): modA  Discharge Goal: modA    Comments: Pt plans to return home with spouse at discharge.  Spouse and family  to assist pt at home.  Spouse may hire a caregiver to assist too.    Signed by: JULISSA Arechiga    Physician CoSigned By: Jose Fatima 09/07/2022 09:17:48

## 2022-09-07 NOTE — THERAPY TREATMENT NOTE
Inpatient Rehabilitation - Occupational Therapy Treatment Note    [facility]     Patient Name: Eloina Batista  : 1940  MRN: [id number]    Today's Date: 2022                 Admit Date: 2022       No diagnosis found.    Patient Active Problem List   Diagnosis   • Chest pain   • CAD (coronary artery disease)   • Essential hypertension   • Hyperlipidemia   • Sebaceous cyst   • Left displaced femoral neck fracture (HCC)   • Fall, initial encounter   • Atrial fibrillation with rapid ventricular response (HCC)   • Stage 3a chronic kidney disease (HCC)   • S/p left hip fracture       Past Medical History:   Diagnosis Date   • Arthritis    • Coronary artery disease    • GERD (gastroesophageal reflux disease)    • Hyperlipidemia    • Hypertension        Past Surgical History:   Procedure Laterality Date   • APPENDECTOMY     • BLADDER REPAIR     • CARDIAC CATHETERIZATION     • CARDIAC CATHETERIZATION N/A 3/22/2017    Procedure: Left Heart Cath;  Surgeon: Alex Blanco MD;  Location: [facility] INVASIVE LOCATION;  Service:    • CARDIAC SURGERY     • CHOLECYSTECTOMY     • CORONARY ARTERY BYPASS GRAFT     • CORONARY STENT PLACEMENT     • HERNIA REPAIR     • HIP HEMIARTHROPLASTY Left 2022    Procedure: HIP HEMIARTHROPLASTY LEFT;  Surgeon: Jeramy Reid Jr., MD;  Location: [facility] OR;  Service: Orthopedics;  Laterality: Left;   • HYSTERECTOMY               IRF OT ASSESSMENT FLOWSHEET (last 12 hours)     IRF OT Evaluation and Treatment     Row Name 22 1045          OT Time and Intention    Document Type daily treatment  -TM     Mode of Treatment occupational therapy  -TM     Patient Effort adequate  -TM     Symptoms Noted During/After Treatment none  -TM     Row Name 22 1045          General Information    General Observations of Patient Pt agreeable for therapy.  -TM     Existing Precautions/Restrictions fall;left;hip, posterior  -TM     Row Name 22 1045          Cognition/Psychosocial     Orientation Status (Cognition) oriented x 4  -TM     Follows Commands (Cognition) follows one-step commands;verbal cues/prompting required  -TM     Row Name 09/07/22 1045          Bathing    [place] Level (Bathing) minimum assist (75% patient effort);verbal cues  -TM     Assistive Device (Bathing) long-handled sponge  -TM     Position (Bathing) edge of bed sitting  -TM     Row Name 09/07/22 1045          Upper Body Dressing    [place] Level (Upper Body Dressing) set up assistance  -TM     Position (Upper Body Dressing) edge of bed sitting  -TM     Row Name 09/07/22 1045          Lower Body Dressing    [place] Level (Lower Body Dressing) moderate assist (50% patient effort);verbal cues  -TM     Assistive Device Use (Lower Body Dressing) sock-aid;reacher;other (see comments)  assistance with AE; pt reported she doesn't plan to utilize AE at home, that family will assist with bathing/dressing.  -TM     Position (Lower Body Dressing) edge of bed sitting  -TM     Row Name 09/07/22 1045          Grooming    [place] Level (Grooming) set up  -TM     Position (Grooming) supported sitting  -TM     Row Name 09/07/22 1045          Transfers    Bed-Chair [place] (Transfers) minimum assist (75% patient effort);contact guard;verbal cues  -TM     Assistive Device (Bed-Chair Transfers) wheelchair;walker, front-wheeled  -TM     Row Name 09/07/22 1045          Motor Skills    Motor Skills coordination;functional endurance;motor control/coordination interventions  -TM     Motor Control/Coordination Interventions therapeutic exercise/ROM;fine motor manipulation/dexterity activities;gross motor coordination activities;other (see comments)  BUE ther ex/act, GMC/FMC  -TM           User Key  (r) = Recorded By, (t) = Taken By, (c) = Cosigned By    Initials Name Effective Dates    TM Karen Lombardi OT 06/16/21 -                  Occupational Therapy Education                 Title: PT OT SLP Therapies  (Done)     Topic: Occupational Therapy (Done)     Point: ADL training (Done)     Description:   Instruct learner(s) on proper safety adaptation and remediation techniques during self care or transfers.   Instruct in proper use of assistive devices.              Learning Progress Summary           Patient Acceptance, E,D, VU,NR by  at 9/7/2022 1044      Show all documentation for this point (9)                 Point: Precautions (Done)     Description:   Instruct learner(s) on prescribed precautions during self-care and functional transfers.              Learning Progress Summary           Patient Acceptance, E,D, VU,NR by TM at 9/7/2022 1044      Show all documentation for this point (9)                             User Key     Initials Effective Dates Name Provider Type Discipline     06/16/21 -  Karen Lombardi OT Occupational Therapist OT                    OT Recommendation and Plan    Planned Therapy Interventions (OT): activity tolerance training, BADL retraining, adaptive equipment training, IADL retraining, occupation/activity based interventions, patient/caregiver education/training, ROM/therapeutic exercise, strengthening exercise, transfer/mobility retraining                    Time Calculation:      Time Calculation- OT     Row Name 09/07/22 1436 09/07/22 1043          Time Calculation- OT    OT Start Time 1235  -TM 1000  -TM     OT Stop Time 1330  -TM 1045  -TM     OT Time Calculation (min) 55 min  -TM 45 min  -TM     Total Timed Code Minutes- OT 55 minute(s)  -TM 45 minute(s)  -TM     OT Non-Billable Time (min) -- 15 min  -TM           User Key  (r) = Recorded By, (t) = Taken By, (c) = Cosigned By    Initials Name Provider Type     Karen Lombardi OT Occupational Therapist              Therapy Charges for Today     Code Description Service Date Service Provider Modifiers Qty    49401195238  OT SELF CARE/MGMT/TRAIN EA 15 MIN 9/7/2022 Karen Lombardi OT GO 3    [id number]   OT THERAPEUTIC ACT EA 15 MIN 9/7/2022 Karen Lombardi, OT GO 3    [id number]  OT THER PROC EA 15 MIN 9/7/2022 Karen Lombardi, BIANCA GO 1                   Karen Lombardi OT  9/7/2022

## 2022-09-07 NOTE — THERAPY TREATMENT NOTE
Inpatient Rehabilitation - Physical Therapy Treatment Note       [facility]     Patient Name: Eloina Batista  : 1940  MRN: [id number]    Today's Date: 2022                    Admit Date: 2022      Visit Dx:   No diagnosis found.    Patient Active Problem List   Diagnosis   • Chest pain   • CAD (coronary artery disease)   • Essential hypertension   • Hyperlipidemia   • Sebaceous cyst   • Left displaced femoral neck fracture (HCC)   • Fall, initial encounter   • Atrial fibrillation with rapid ventricular response (HCC)   • Stage 3a chronic kidney disease (HCC)   • S/p left hip fracture       Past Medical History:   Diagnosis Date   • Arthritis    • Coronary artery disease    • GERD (gastroesophageal reflux disease)    • Hyperlipidemia    • Hypertension        Past Surgical History:   Procedure Laterality Date   • APPENDECTOMY     • BLADDER REPAIR     • CARDIAC CATHETERIZATION     • CARDIAC CATHETERIZATION N/A 3/22/2017    Procedure: Left Heart Cath;  Surgeon: Alex Blanco MD;  Location: [facility] INVASIVE LOCATION;  Service:    • CARDIAC SURGERY     • CHOLECYSTECTOMY     • CORONARY ARTERY BYPASS GRAFT     • CORONARY STENT PLACEMENT     • HERNIA REPAIR     • HIP HEMIARTHROPLASTY Left 2022    Procedure: HIP HEMIARTHROPLASTY LEFT;  Surgeon: Jeramy Reid Jr., MD;  Location: [facility] OR;  Service: Orthopedics;  Laterality: Left;   • HYSTERECTOMY         PT ASSESSMENT (last 12 hours)     IRF PT Evaluation and Treatment     Row Name 22 1447 22 1432       PT Time and Intention    Document Type daily treatment  PM session  -RG daily treatment  PM session  -LL    Mode of Treatment individual therapy;physical therapy  -RG individual therapy;physical therapy  -LL    Patient/Family/Caregiver Comments/Observations Pt and nursing in agreement for skilled Pt on this date.  -RG Patient & RN in agreement for participation with PM PT session.  Patient requested no ambulation in PM.  -LL    Row  Name 09/07/22 1447 09/07/22 1432       General Information    Patient Profile Reviewed yes  -RG yes  -LL    Existing Precautions/Restrictions fall;left;hip, posterior  -RG fall;left;hip, posterior  -LL    Row Name 09/07/22 1447 09/07/22 1432       Cognition/Psychosocial    Affect/Mental Status (Cognition) WFL  -RG WFL  -LL    Orientation Status (Cognition) oriented x 4  -RG oriented x 4  -LL    Follows Commands (Cognition) follows one-step commands;verbal cues/prompting required  -RG follows one-step commands;verbal cues/prompting required  -LL    Personal Safety Interventions gait belt;nonskid shoes/slippers when out of bed;fall prevention program maintained  -RG gait belt;nonskid shoes/slippers when out of bed  -LL    Cognitive Function WFL  -RG WFL  -LL    Row Name 09/07/22 1447 09/07/22 1432       Bed Mobility    Bed Mobility supine-sit;sit-supine  -RG supine-sit;sit-supine  -LL    Supine-Sit [place] (Bed Mobility) contact guard  -RG contact guard  -LL    Sit-Supine [place] (Bed Mobility) minimum assist (75% patient effort)  w/ LE's  -RG minimum assist (75% patient effort)  w/ LE's  -LL    Assistive Device (Bed Mobility) bed rails  -RG bed rails  -LL    Row Name 09/07/22 1447 09/07/22 1432       Transfer Assessment/Treatment    Transfers sit-stand transfer;bed-chair transfer;stand-sit transfer  -RG sit-stand transfer;bed-chair transfer;stand-sit transfer  -LL    Row Name 09/07/22 1447 09/07/22 1432       Transfers    Chair-Bed [place] (Transfers) contact guard  -RG contact guard  -LL    Sit-Stand [place] (Transfers) contact guard  -RG contact guard  -LL    Stand-Sit [place] (Transfers) contact guard  -RG contact guard  -LL    [place] Level (Toilet Transfer) contact guard;verbal cues;nonverbal cues (demo/gesture)  -RG contact guard;verbal cues;nonverbal cues (demo/gesture)  -LL    Assistive Device (Toilet Transfer) grab bars/safety frame;wheelchair  -RG grab bars/safety  frame;wheelchair  -    Row Name 09/07/22 1447 09/07/22 1432       Chair-Bed Transfer    Assistive Device (Chair-Bed Transfers) wheelchair  bedrail  -RG wheelchair  bedrail  -LL    Row Name 09/07/22 1447 09/07/22 1432       Sit-Stand Transfer    Assistive Device (Sit-Stand Transfers) wheelchair  -RG wheelchair  -LL    Row Name 09/07/22 1447 09/07/22 1432       Stand-Sit Transfer    Assistive Device (Stand-Sit Transfers) wheelchair  -RG wheelchair  -LL    Row Name 09/07/22 1447 09/07/22 1432       Toilet Transfer    Type (Toilet Transfer) stand pivot/stand step  -RG stand pivot/stand step  -    Row Name 09/07/22 1447 09/07/22 1432       Gait/Stairs (Locomotion)    [place] Level (Gait) contact guard;verbal cues;nonverbal cues (demo/gesture)  -RG --    Assistive Device (Gait) walker, front-wheeled  -RG --    Distance in Feet (Gait) 60  -RG --    Pattern (Gait) step-to  -RG --    Deviations/Abnormal Patterns (Gait) antalgic;gait speed decreased;ifeanyi decreased;weight shifting decreased  -RG --    Comment, (Gait/Stairs) Pt encouraged to ambulate farther than previous session but declined.  -RG Requested no ambulation in PM  -    Row Name 09/07/22 1447 09/07/22 1432       Safety Issues, Functional Mobility    Impairments Affecting Function (Mobility) balance;endurance/activity tolerance;pain;postural/trunk control;range of motion (ROM);strength  -RG balance;endurance/activity tolerance;pain;postural/trunk control;range of motion (ROM);strength  -    Row Name 09/07/22 1447 09/07/22 1432       Hip (Therapeutic Exercise)    Hip Strengthening (Therapeutic Exercise) bilateral;flexion;aBduction;aDduction;marching while seated;sitting;2 lb free weight;resistance band;green;10 repetitions;2 sets  -RG bilateral;flexion;extension;aBduction;aDduction;marching while seated;sitting;resistance band;green  -    Row Name 09/07/22 1447 09/07/22 1432       Knee (Therapeutic Exercise)    Knee Strengthening (Therapeutic  Exercise) bilateral;flexion;extension;heel slides;marching while seated;LAQ (long arc quad);sitting;2 lb free weight;resistance band;green;10 repetitions;2 sets  -RG bilateral;flexion;extension;marching while seated;sitting;resistance band;green  -LL    Row Name 09/07/22 1447 09/07/22 1432       Ankle (Therapeutic Exercise)    Ankle Strengthening (Therapeutic Exercise) bilateral;sitting;dorsiflexion;plantarflexion;10 repetitions;2 sets  -RG bilateral;dorsiflexion;plantarflexion;sitting  -LL    Row Name 09/07/22 1447 09/07/22 1432       Positioning and Restraints    Pre-Treatment Position sitting in chair/recliner  -RG --  WC w/ OT  -LL    Post Treatment Position wheelchair  -RG bed  -LL    In Bed -- supine;call light within reach;encouraged to call for assist;side rails up x2;RUE elevated;LUE elevated;legs elevated  -LL    In Wheelchair notified nsg;sitting;call light within reach;encouraged to call for assist;legs elevated  up for lunch  -RG --    Row Name 09/07/22 1447 09/07/22 1432       Daily Progress Summary (PT)    Impairments Still Limiting Function (PT) functional activity tolerance impairment;pain;strength deficit  -RG functional activity tolerance impairment;pain;strength deficit  -LL          User Key  (r) = Recorded By, (t) = Taken By, (c) = Cosigned By    Initials Name Provider Type    LL Roberta Sanchez, PTA Physical Therapist Assistant    Duc Baker PTA Physical Therapist Assistant              Wound 08/18/22 2300 Left palm Skin Tear (Active)   Dressing Appearance open to air 09/07/22 0848   Closure None 09/07/22 0848   Base clean;dry;pink;red 09/07/22 0848   Drainage Amount none 09/07/22 0848   Care, Wound antimicrobial agent applied 09/07/22 0848   Dressing Care dressing changed 09/07/22 0848       Wound Left anterior greater trochanter (Active)   Dressing Appearance dry;intact 09/07/22 0848     Physical Therapy Education                 Title: PT OT SLP Therapies (Done)     Topic: Physical  Therapy (Done)     Point: Mobility training (Done)     Learning Progress Summary           Patient Acceptance, E,D, VU,NR by  at 9/7/2022 1451      Show all documentation for this point (8)                 Point: Home exercise program (Done)     Learning Progress Summary           Patient Acceptance, E,D, VU,NR by  at 9/7/2022 1451      Show all documentation for this point (8)                 Point: Body mechanics (Done)     Learning Progress Summary           Patient Acceptance, E,D, VU,NR by  at 9/7/2022 1451      Show all documentation for this point (8)                 Point: Precautions (Done)     Learning Progress Summary           Patient Acceptance, E,D, VU,NR by  at 9/7/2022 1451      Show all documentation for this point (8)                             User Key     Initials Effective Dates Name Provider Type Discipline     06/16/21 -  Duc Dahl PTA Physical Therapist Assistant PT                PT Recommendation and Plan    Frequency of Treatment (PT): 5 times per week  Anticipated Equipment Needs at Discharge (PT Eval):  (tbd)  Daily Progress Summary (PT)  Impairments Still Limiting Function (PT): functional activity tolerance impairment, pain, strength deficit               Time Calculation:      PT Charges     Row Name 09/07/22 1452 09/07/22 1438          Time Calculation    Start Time 1045  - 1330  -     Stop Time 1130  - 1425  -     Time Calculation (min) 45 min  - 55 min  -LL     PT Received On 09/07/22  - 09/07/22  -LL            Time Calculation- PT    Total Timed Code Minutes- PT 45 minute(s)  -RG 55 minute(s)  -LL           User Key  (r) = Recorded By, (t) = Taken By, (c) = Cosigned By    Initials Name Provider Type    LL Roberta Sanchez PTA Physical Therapist Assistant    Duc Baker PTA Physical Therapist Assistant                Therapy Charges for Today     Code Description Service Date Service Provider Modifiers Qty    19108890006 HC GAIT TRAINING EA 15  MIN 9/7/2022 Duc Dahl PTA GP, CQ 1    22468047272 HC PT THERAPEUTIC ACT EA 15 MIN 9/7/2022 Duc Dahl PTA GP, CQ 1    32118209469 HC PT THER PROC EA 15 MIN 9/7/2022 Duc Dahl PTA GP, CQ 1            PT G-Codes  AM-PAC 6 Clicks Score (PT): 6      Duc Dahl PTA  9/7/2022

## 2022-09-07 NOTE — PROGRESS NOTES
[facility]  PROGRESS NOTE     Patient Identification:  Name:  Eloina Batista  Age:  82 y.o.  Sex:  female  :  1940  MRN:  [id number]  Visit Number:  [id number]  ROOM: [facility]     Primary Care Provider:  Alex Og MD    Length of stay in inpatient status:  13    Subjective     Chief Compliant:  No chief complaint on file.      History of Presenting Illness: 82-year-old female who is status post left hip fracture with repair.  Patient has a history of coronary disease, recent lumbar back surgery, atrial fibrillation, CAD, hypertension.  Patient states she is doing reasonably well this morning has no new complaints.  Patient making progress with her therapy efforts at this time    Objective     Current Hospital Meds:apixaban, 5 mg, Oral, Q12H  atorvastatin, 80 mg, Oral, Nightly  calcium carbonate (oyster shell), 500 mg, Oral, Daily  cetirizine, 10 mg, Oral, Daily  clopidogrel, 75 mg, Oral, Daily  estrogens (conjugated), 0.3 mg, Oral, Daily  famotidine, 20 mg, Oral, BID AC  metoprolol succinate XL, 125 mg, Oral, Q24H  multivitamin, 1 tablet, Oral, Daily  neomycin-bacitracin-polymyxin, 1 application, Topical, Q12H  polyethylene glycol, 17 g, Oral, Daily  valsartan, 80 mg, Oral, Q24H  vitamin B-12, 1,000 mcg, Oral, Daily  vitamin B-6, 25 mg, Oral, TID  vitamin D3, 5,000 Units, Oral, Daily  vitamin E, 400 Units, Oral, BID       ----------------------------------------------------------------------------------------------------------------------  Vital Signs:  Temp:  [97.5 °F (36.4 °C)-97.7 °F (36.5 °C)] 97.5 °F (36.4 °C)  Heart Rate:  [] 114  Resp:  [18-20] 18  BP: (105-113)/(47-75) 105/47  SpO2:  [95 %-96 %] 95 %  on   ;   Device (Oxygen Therapy): room air  Body mass index is 28.29 kg/m².    Wt Readings from Last 3 Encounters:   22 77.1 kg (170 lb)   22 77.5 kg (170 lb 13.7 oz)   21 77.1 kg (170 lb)     Intake & Output (last 3 days)        0701   07  0701 09/06 0700 09/06 0701 09/07 0700 09/07 0701 09/08 0700    P.O. 1320 1200 1080 240    Total Intake(mL/kg) 1320 (17.1) 1200 (15.6) 1080 (14) 240 (3.1)    Urine (mL/kg/hr) 1550 (0.8) 800 (0.4)      Stool 0       Total Output 1550 800      Net -230 +400 +1080 +240            Urine Unmeasured Occurrence  4 x 4 x 1 x    Stool Unmeasured Occurrence 1 x  3 x         Diet Regular  ----------------------------------------------------------------------------------------------------------------------  Physical exam:  Constitutional:   Elderly female no distress  HEENT: Normocephalic atraumatic  Neck: Supple   Cardiovascular: Irregularly irregular, ventricular rate   Pulmonary/Chest: Clear to auscultation  Abdominal: Positive bowel sounds soft.   Musculoskeletal: Status post hip repair  Neurological: No focal deficits  Skin: No rash  Peripheral vascular:  Genitourinary:  ----------------------------------------------------------------------------------------------------------------------    Last echocardiogram:  Results for orders placed during the hospital encounter of 08/18/22    Adult Transthoracic Echo Complete W/ Cont if Necessary Per Protocol    Interpretation Summary  · Estimated left ventricular EF = 66% Left ventricular ejection fraction appears to be 66 - 70%. Left ventricular systolic function is hyperdynamic (EF > 70%).  · Mild to moderate aortic valve regurgitation is present.  · Left ventricular wall thickness is consistent with hypertrophy.  · Left ventricular diastolic function was indeterminate.  · There is calcification of the aortic valve.  · Estimated right ventricular systolic pressure from tricuspid regurgitation is normal (<35 mmHg). Calculated right ventricular systolic pressure from tricuspid regurgitation is 33 mmHg.    ----------------------------------------------------------------------------------------------------------------------  Results from last 7 days   Lab Units 09/04/22  0032  09/02/22  0728   WBC 10*3/mm3 5.40 9.64   HEMOGLOBIN g/dL 10.2* 9.4*   HEMATOCRIT % 33.3* 30.7*   MCV fL 101.5* 102.7*   MCHC g/dL 30.6* 30.6*   PLATELETS 10*3/mm3 284 286         Results from last 7 days   Lab Units 09/04/22  0032 09/02/22  0728   SODIUM mmol/L 135* 137   POTASSIUM mmol/L 4.1 4.2   CHLORIDE mmol/L 104 103   CO2 mmol/L 21.4* 23.5   BUN mg/dL 10 11   CREATININE mg/dL 0.94 0.96   CALCIUM mg/dL 9.6 9.4   GLUCOSE mg/dL 108* 119*   ALBUMIN g/dL 2.78* 2.61*   BILIRUBIN mg/dL 1.1 1.3*   ALK PHOS U/L 111 107   AST (SGOT) U/L 35* 36*   ALT (SGPT) U/L 26 24   Estimated Creatinine Clearance: 48.2 mL/min (by C-G formula based on SCr of 0.94 mg/dL).  No results found for: AMMONIA  Results from last 7 days   Lab Units 09/02/22  0728   CK TOTAL U/L 43             No results found for: HGBA1C, POCGLU  Lab Results   Component Value Date    TSH 2.170 09/03/2022     No results found for: PREGTESTUR, PREGSERUM, HCG, HCGQUANT  Pain Management Panel    There is no flowsheet data to display.       Brief Urine Lab Results  (Last result in the past 365 days)      Color   Clarity   Blood   Leuk Est   Nitrite   Protein   CREAT   Urine HCG        08/21/22 0805 Yellow   Clear   Negative   Moderate (2+)   Negative   Negative               No results found for: BLOODCX      No results found for: URINECX  No results found for: WOUNDCX  No results found for: STOOLCX        I have personally looked at the labs and they are summarized above.  ----------------------------------------------------------------------------------------------------------------------  Detailed radiology reports for the last 24 hours:    Imaging Results (Last 24 Hours)     ** No results found for the last 24 hours. **        Final impressions for the last 30 days of radiology reports:    XR Hand 2 View Left    Result Date: 8/28/2022    No acute findings in the left hand.  This report was finalized on 8/28/2022 12:17 PM by Dr. Joshua Chaudhry MD.      XR Femur 2  View Left    Result Date: 8/20/2022  Left hip arthroplasty noted in place. There is no evidence of additional, previously unidentified fracture or dislocation. The knee joint is partially imaged and appears normally aligned.  This report was finalized on 8/20/2022 9:13 AM by Aristeo Duvall.      CT Pelvis Without Contrast    Result Date: 8/18/2022  Acute noncomminuted subcapital neck fracture of left proximal femur. Associated, relatively mild diffuse subcutaneous bruising. No evidence of acute trauma elsewhere.  This report was finalized on 8/18/2022 11:36 PM by Dr. Vinayak Birch MD.      XR Chest 1 View    Result Date: 8/21/2022  No acute cardiopulmonary abnormality. Electronically signed by:  Jose Schulz M.D.  8/21/2022 5:46 AM Mountain Time    XR Chest 1 View    Result Date: 8/19/2022  1.  No evidence for acute cardiopulmonary process.  This report was finalized on 8/19/2022 8:54 AM by Jeramy Mejia MD.      CT Lower Extremity Left Without Contrast    Result Date: 8/21/2022  1.  Status post left hip arthroplasty. No evidence of periprosthetic fracture within limitations of artifact from the arthroplasty. Electronically signed by:  Roger Rucker M.D.  8/21/2022 12:08 AM Mountain Time    CT Lower Extremity Left Without Contrast    Result Date: 8/20/2022  No evidence of acute fracture. Arthroplasty hardware appears in appropriate position. There is a large hematoma present posterior to the femoral component and deep to the gluteus musculature, measuring 8.8 x 4.8 x 5.5 cm.  This report was finalized on 8/20/2022 3:06 PM by Aristeo Duvall.      XR Spine Lumbar Complete 4+VW    Result Date: 8/18/2022  Arthritic change, but no acute bony abnormality  This report was finalized on 8/18/2022 7:59 AM by Dr. Joshua Chaudhry MD.      XR Hip With or Without Pelvis 1 View Left    Result Date: 8/20/2022  Postarthroplasty changes without identified complication. Electronically signed by:  Jose Schulz M.D.  8/20/2022 5:02  AM Mountain Time    XR Hip With or Without Pelvis 2 - 3 View Left    Result Date: 9/1/2022    Left hip prosthesis. Alignment anatomic.  This report was finalized on 9/1/2022 11:41 AM by Dr. Joshua Chaudhry MD.      XR Hip With or Without Pelvis 2 - 3 View Left    Result Date: 8/20/2022  Postsurgical findings of interval left total hip arthroplasty without evidence of immediate hardware complication. Expected soft tissue edema and subcutaneous emphysema.    This report was finalized on 8/20/2022 5:54 AM by Aristeo Duvall.      XR Hips Bilateral With or Without Pelvis 5 View    Result Date: 8/18/2022  Mild arthritic change.  This report was finalized on 8/18/2022 9:31 AM by Dr. Joshua Chaudhry MD.      I have personally looked at the radiology images and read the final radiology report.    Assessment & Plan    Status post left hip fracture with ORIF--requiring contact-guard for bed mobility; contact-guard for transfers; ambulated 60 feet with front wheel walker yesterday with contact-guard.  Continues to work on motor skills to improve ADLs and functional mobility.    Atrial fibrillation continue Toprol-XL and Eliquis.    CAD continue high-dose statin therapy and Plavix    Hypertension metoprolol and valsartan.  Patient's blood pressure borderline low this morning we will hold the valsartan at this time    Anemia stable    VTE Prophylaxis:   Mechanical Order History:     None      Pharmalogical Order History:      Ordered     Dose Route Frequency Stop    08/25/22 2136  apixaban (ELIQUIS) tablet 5 mg         5 mg PO Every 12 Hours Scheduled --                    Jose Fatima MD  [facility]  09/07/22  11:51 EDT

## 2022-09-07 NOTE — PLAN OF CARE
Goal Outcome Evaluation:  Plan of Care Reviewed With: patient        Progress: improving  Patient participating with therapies; making progress. Continue with plan of care

## 2022-09-08 NOTE — SIGNIFICANT NOTE
09/08/22 1010   Plan   Plan SS received call from spouse who says he wants hospital bed for pt to use at home and prefers fully electric bed.  Spouse agreeable to pay $200 upcharge fee for fully electric bed and wants it delivered next week; he will call [facility] when he is ready for delivery.  Pt is going to son Jeramy's home until bathroom project on main level is completed.  Son's address is [address] [place] [address].  Spouse says pt's friend Gabriela Hill will be staying with her during the day at discharge.  Spouse says BSC and W/C can be delivered to his business today or tomorrow; anahi Johnson works there.  Contacted [facility] [phone] per preference per Alexa with referral, discharge on 9-9-22 and need for PT/OT.  Pt will need PT 2-3 x wk for strengthening, endurance, gait training, transfer training, balance, therapeutic exercise, bed mobility, home safety, functional transfers, steps/stairs, OT 2-3 x wk for ADL re-training, home safety, functional mobility, and strengthening.  Faxed face sheet, H&P, PT/OT notes/evaluations and MD progress note to  via epic.  HH to be contacted at discharge.  Ambulatory referral for home health with face to face and discharge summary to be faxed to HH at discharge.  Contacted [facility] [phone] per preference per April with discharge on 9-9-22 and need for bedside commode, 20 inch standard wheelchair with swing away leg rests, anti-tippers, basic cushion and hospital bed (spouse prefers fully electric bed and agreeable to pay $200 upcharge fee).  BSC and W/C to be delivered to spouse's business today or tomorrow and spouse will call [facility] for delivery of hospital bed.  Faxed face sheet, H&P, PT/OT notes/evaluations and MD progress note to [facility] via [employer].   Patient/Family in Agreement with Plan yes

## 2022-09-08 NOTE — THERAPY TREATMENT NOTE
Inpatient Rehabilitation - Physical Therapy Treatment Note       [facility]     Patient Name: Eloina Batista  : 1940  MRN: [id number]    Today's Date: 2022                    Admit Date: 2022      Visit Dx:   No diagnosis found.    Patient Active Problem List   Diagnosis   • Chest pain   • CAD (coronary artery disease)   • Essential hypertension   • Hyperlipidemia   • Sebaceous cyst   • Left displaced femoral neck fracture (HCC)   • Fall, initial encounter   • Atrial fibrillation with rapid ventricular response (HCC)   • Stage 3a chronic kidney disease (HCC)   • S/p left hip fracture       Past Medical History:   Diagnosis Date   • Arthritis    • Coronary artery disease    • GERD (gastroesophageal reflux disease)    • Hyperlipidemia    • Hypertension        Past Surgical History:   Procedure Laterality Date   • APPENDECTOMY     • BLADDER REPAIR     • CARDIAC CATHETERIZATION     • CARDIAC CATHETERIZATION N/A 3/22/2017    Procedure: Left Heart Cath;  Surgeon: Alex Blanco MD;  Location: [facility] INVASIVE LOCATION;  Service:    • CARDIAC SURGERY     • CHOLECYSTECTOMY     • CORONARY ARTERY BYPASS GRAFT     • CORONARY STENT PLACEMENT     • HERNIA REPAIR     • HIP HEMIARTHROPLASTY Left 2022    Procedure: HIP HEMIARTHROPLASTY LEFT;  Surgeon: Jeramy Reid Jr., MD;  Location: [facility] OR;  Service: Orthopedics;  Laterality: Left;   • HYSTERECTOMY         PT ASSESSMENT (last 12 hours)     IRF PT Evaluation and Treatment     Row Name 22 1419          PT Time and Intention    Document Type daily treatment  PM session  -RG     Mode of Treatment individual therapy;physical therapy  -RG     Patient/Family/Caregiver Comments/Observations Pt and nursing in agreement for skilled PT on this date.  Pts c/o feeling dizzy. Pts BP was 78/55 nursing aware.  -RG     Row Name 22 1419          General Information    Patient Profile Reviewed yes  -RG     Existing Precautions/Restrictions fall;left;hip,  posterior  -[facility] Name 09/08/22 1419          Cognition/Psychosocial    Affect/Mental Status (Cognition) WFL  -     Orientation Status (Cognition) oriented x 4  -RG     Follows Commands (Cognition) follows one-step commands;verbal cues/prompting required  -RG     Personal Safety Interventions gait belt;nonskid shoes/slippers when out of bed;fall prevention program maintained  -RG     Cognitive Function WFL  -     Row Name 09/08/22 1419          Bed Mobility    Bed Mobility supine-sit;sit-supine  -RG     Sit-Supine [place] (Bed Mobility) --  w/ LE's  -[facility] Name 09/08/22 1419          Transfer Assessment/Treatment    Transfers sit-stand transfer;bed-chair transfer;stand-sit transfer;car transfer  -[facility] Name 09/08/22 1419          Transfers    Sit-Stand [place] (Transfers) contact guard  -     Stand-Sit [place] (Transfers) contact guard  -[facility] Name 09/08/22 1419          Chair-Bed Transfer    Assistive Device (Chair-Bed Transfers) --  bedrail  -[facility] Name 09/08/22 1419          Sit-Stand Transfer    Assistive Device (Sit-Stand Transfers) wheelchair  -[facility] Name 09/08/22 1419          Stand-Sit Transfer    Assistive Device (Stand-Sit Transfers) wheelchair  -[facility] Name 09/08/22 1419          Car Transfer    Type (Car Transfer) stand pivot/stand step  -     [place] Level (Car Transfer) contact guard;verbal cues;nonverbal cues (demo/gesture)  -[facility] Name 09/08/22 1419          Gait/Stairs (Locomotion)    Comment, (Gait/Stairs) Did not ambulate 2nd session due to low BP  -[facility] Name 09/08/22 1419          Safety Issues, Functional Mobility    Impairments Affecting Function (Mobility) balance;endurance/activity tolerance;pain;postural/trunk control;range of motion (ROM);strength  -[facility] Name 09/08/22 1419          Hip (Therapeutic Exercise)    Hip Strengthening (Therapeutic Exercise) bilateral;flexion;aBduction;aDduction;marching while  seated;sitting;10 repetitions;2 sets  -     Row Name 09/08/22 1419          Knee (Therapeutic Exercise)    Knee Strengthening (Therapeutic Exercise) bilateral;flexion;extension;marching while seated;LAQ (long arc quad);sitting;green;10 repetitions;2 sets  -     Row Name 09/08/22 1419          Ankle (Therapeutic Exercise)    Ankle Strengthening (Therapeutic Exercise) bilateral;dorsiflexion;plantarflexion;sitting;10 repetitions;2 sets  -     Row Name 09/08/22 1419          Positioning and Restraints    Pre-Treatment Position sitting in chair/recliner  -     Post Treatment Position wheelchair  -RG     In Wheelchair notified nsg;sitting;call light within reach;encouraged to call for assist;legs elevated  -     Row Name 09/08/22 1419          Vital Signs    Intra Systolic BP Rehab 78  -RG     Intra Treatment Diastolic BP 55  -     Intra Patient Position Sitting  -     Row Name 09/08/22 1419          Daily Progress Summary (PT)    Impairments Still Limiting Function (PT) functional activity tolerance impairment;pain;strength deficit  -           User Key  (r) = Recorded By, (t) = Taken By, (c) = Cosigned By    Initials Name Provider Type    RG Duc Dahl PTA Physical Therapist Assistant              Wound 08/18/22 2300 Left palm Skin Tear (Active)   Dressing Appearance dry;intact 09/08/22 0819   Closure None 09/08/22 0819   Base clean;dry;pink;red 09/08/22 0819   Drainage Amount none 09/08/22 0819   Care, Wound antimicrobial agent applied 09/08/22 0819   Dressing Care dressing changed 09/08/22 0819       Wound Left anterior greater trochanter (Active)   Dressing Appearance dry;intact 09/08/22 0819   Base clean;dry;pink;red 09/08/22 0819     Physical Therapy Education                 Title: PT OT SLP Therapies (Done)     Topic: Physical Therapy (Done)     Point: Mobility training (Done)     Learning Progress Summary           Patient Acceptance, E,D, VU,NR by  at 9/8/2022 1435      Show all  documentation for this point (9)                 Point: Home exercise program (Done)     Learning Progress Summary           Patient Acceptance, E,D, VU,NR by  at 9/8/2022 1435      Show all documentation for this point (9)                 Point: Body mechanics (Done)     Learning Progress Summary           Patient Acceptance, E,D, VU,NR by  at 9/8/2022 1435      Show all documentation for this point (9)                 Point: Precautions (Done)     Learning Progress Summary           Patient Acceptance, E,D, VU,NR by  at 9/8/2022 1435      Show all documentation for this point (9)                             User Key     Initials Effective Dates Name Provider Type Discipline     06/16/21 -  Duc Dahl PTA Physical Therapist Assistant PT                PT Recommendation and Plan    Frequency of Treatment (PT): 5 times per week  Anticipated Equipment Needs at Discharge (PT Eval):  (tbd)  Daily Progress Summary (PT)  Impairments Still Limiting Function (PT): functional activity tolerance impairment, pain, strength deficit               Time Calculation:      PT Charges     Row Name 09/08/22 1435             Time Calculation    Start Time 1045  -RG      Stop Time 1130  -RG      Time Calculation (min) 45 min  -RG      PT Received On 09/08/22  -              Time Calculation- PT    Total Timed Code Minutes- PT 45 minute(s)  -            User Key  (r) = Recorded By, (t) = Taken By, (c) = Cosigned By    Initials Name Provider Type     Duc Dahl PTA Physical Therapist Assistant                Therapy Charges for Today     Code Description Service Date Service Provider Modifiers Qty    73678860317 HC GAIT TRAINING EA 15 MIN 9/7/2022 Duc Dahl PTA GP, CQ 1    37395355292 HC PT THERAPEUTIC ACT EA 15 MIN 9/7/2022 Duc Dahl PTA GP, CQ 1    12935309297 HC PT THER PROC EA 15 MIN 9/7/2022 Duc Dahl PTA GP, CQ 1    06753155884 HC PT THERAPEUTIC ACT EA 15 MIN 9/8/2022 Duc Dahl PTA GP,  CQ 2    55389495669 HC PT THER PROC EA 15 MIN 9/8/2022 Duc Dahl, AUSTYN GP, CQ 1            PT G-Codes  AM-PAC 6 Clicks Score (PT): 6      Duc Dahl PTA  9/8/2022

## 2022-09-08 NOTE — PROGRESS NOTES
Rehabilitation Nursing  Inpatient Rehabilitation Plan of Care Note    Plan of Care  Copy from [facility]    Bed/Chair/Wheelchair (Active)  Current Status (8/25/2022 12:00:00 AM): Increase in physical activity  Weekly Goal: Joint mobility maintained  Discharge Goal: Demonstrate use of adaptive equipment.    Safety    Potential for Injury (Active)  Current Status (8/25/2022 12:00:00 AM): No falls this shift  Weekly Goal: No falls this hospital stay  Discharge Goal: Identy measures to prevent injury    Signed by: Gabriela Frye Nurse

## 2022-09-08 NOTE — PROGRESS NOTES
Occupational Therapy:    Physical Therapy: Individual: 90 minutes.    Speech Language Pathology:    Signed by: Duc Dahl PTA

## 2022-09-08 NOTE — SIGNIFICANT NOTE
09/08/22 1331   Plan   Plan Spoke to Alexa with [facility] [phone] about pt going to son Jeramy Batista's home at discharge for a short-term stay and provided his address.

## 2022-09-08 NOTE — DISCHARGE INSTR - OTHER ORDERS
[facility] [facility] [phone] for PT/OT 2-3 x week.    [facility] [phone] for bedside commode, 20 inch standard wheelchair with anti-tippers, swing away leg rests, basic cushion and hospital bed (spouse will pay $200 upcharge fee for fully electric bed).

## 2022-09-08 NOTE — THERAPY TREATMENT NOTE
Inpatient Rehabilitation - Occupational Therapy Treatment Note    [facility]     Patient Name: Eloina Batista  : 1940  MRN: [id number]    Today's Date: 2022                 Admit Date: 2022       No diagnosis found.    Patient Active Problem List   Diagnosis   • Chest pain   • CAD (coronary artery disease)   • Essential hypertension   • Hyperlipidemia   • Sebaceous cyst   • Left displaced femoral neck fracture (HCC)   • Fall, initial encounter   • Atrial fibrillation with rapid ventricular response (HCC)   • Stage 3a chronic kidney disease (HCC)   • S/p left hip fracture       Past Medical History:   Diagnosis Date   • Arthritis    • Coronary artery disease    • GERD (gastroesophageal reflux disease)    • Hyperlipidemia    • Hypertension        Past Surgical History:   Procedure Laterality Date   • APPENDECTOMY     • BLADDER REPAIR     • CARDIAC CATHETERIZATION     • CARDIAC CATHETERIZATION N/A 3/22/2017    Procedure: Left Heart Cath;  Surgeon: Alex Blanco MD;  Location: [facility] INVASIVE LOCATION;  Service:    • CARDIAC SURGERY     • CHOLECYSTECTOMY     • CORONARY ARTERY BYPASS GRAFT     • CORONARY STENT PLACEMENT     • HERNIA REPAIR     • HIP HEMIARTHROPLASTY Left 2022    Procedure: HIP HEMIARTHROPLASTY LEFT;  Surgeon: Jeramy Reid Jr., MD;  Location: [facility] OR;  Service: Orthopedics;  Laterality: Left;   • HYSTERECTOMY               IRF OT ASSESSMENT FLOWSHEET (last 12 hours)     IRF OT Evaluation and Treatment     Row Name 22 1344          OT Time and Intention    Document Type daily treatment  -TM     Mode of Treatment occupational therapy  -TM     Patient Effort good  -TM     Symptoms Noted During/After Treatment none  -TM     Row Name 22 1343          General Information    General Observations of Patient Pt agreeable for therapy.  -TM     Existing Precautions/Restrictions fall;left;hip, posterior  -TM     Row Name 22 1341          Cognition/Psychosocial     Orientation Status (Cognition) oriented x 4  -TM     Follows Commands (Cognition) follows one-step commands;verbal cues/prompting required  -TM     Row Name 09/08/22 1343          Upper Body Dressing    Position (Upper Body Dressing) edge of bed sitting  -TM     Comment (Upper Body Dressing) setup  -TM     Row Name 09/08/22 1343          Lower Body Dressing    Position (Lower Body Dressing) edge of bed sitting  -TM     Comment (Lower Body Dressing) modA  -TM     Row Name 09/08/22 1343          Grooming    [place] Level (Grooming) set up  -TM     Position (Grooming) supported sitting  -TM     Row Name 09/08/22 1343          Transfers    Bed-Chair [place] (Transfers) contact guard;verbal cues  -TM     Assistive Device (Bed-Chair Transfers) walker, front-wheeled;wheelchair  -TM     Row Name 09/08/22 1343          Motor Skills    Motor Skills coordination;functional endurance;motor control/coordination interventions  -TM     Motor Control/Coordination Interventions therapeutic exercise/ROM;gross motor coordination activities;fine motor manipulation/dexterity activities;other (see comments)  BUE ther ex/act, GMC/FMC  -TM           User Key  (r) = Recorded By, (t) = Taken By, (c) = Cosigned By    Initials Name Effective Dates     Karen Lombardi, OT 06/16/21 -                  Occupational Therapy Education                 Title: PT OT SLP Therapies (Done)     Topic: Occupational Therapy (Done)     Point: ADL training (Done)     Description:   Instruct learner(s) on proper safety adaptation and remediation techniques during self care or transfers.   Instruct in proper use of assistive devices.              Learning Progress Summary           Patient Acceptance, E,D, VU,NR by TM at 9/8/2022 1342      Show all documentation for this point (10)                 Point: Precautions (Done)     Description:   Instruct learner(s) on prescribed precautions during self-care and functional transfers.               Learning Progress Summary           Patient Acceptance, E,D, VU,NR by TM at 9/8/2022 1342      Show all documentation for this point (10)                             User Key     Initials Effective Dates Name Provider Type Discipline     06/16/21 -  Karen Lombardi OT Occupational Therapist OT                    OT Recommendation and Plan    Planned Therapy Interventions (OT): activity tolerance training, BADL retraining, adaptive equipment training, IADL retraining, occupation/activity based interventions, patient/caregiver education/training, ROM/therapeutic exercise, strengthening exercise, transfer/mobility retraining                    Time Calculation:      Time Calculation- OT     Row Name 09/08/22 1342 09/08/22 1341          Time Calculation- OT    OT Start Time 1240  -TM 0920  -TM     OT Stop Time 1330  -TM 1000  -TM     OT Time Calculation (min) 50 min  -TM 40 min  -TM     Total Timed Code Minutes- OT 50 minute(s)  -TM 40 minute(s)  -TM     OT Non-Billable Time (min) -- 15 min  -TM           User Key  (r) = Recorded By, (t) = Taken By, (c) = Cosigned By    Initials Name Provider Type     Karen Lombardi OT Occupational Therapist              Therapy Charges for Today     Code Description Service Date Service Provider Modifiers Qty    77070633557 HC OT SELF CARE/MGMT/TRAIN EA 15 MIN 9/7/2022 Karen Lombardi, OT GO 3    30728860725 HC OT THERAPEUTIC ACT EA 15 MIN 9/7/2022 Karen Lombardi OT GO 3    11719007692 HC OT THER PROC EA 15 MIN 9/7/2022 Karen Lombardi, OT GO 1    57739122100 HC OT SELF CARE/MGMT/TRAIN EA 15 MIN 9/8/2022 Karen Lombardi, OT GO 2    43062725003 HC OT THERAPEUTIC ACT EA 15 MIN 9/8/2022 Karen Lombardi OT GO 3    75347120871 HC OT THER PROC EA 15 MIN 9/8/2022 Karen Lombardi, OT GO 1                   Karen Lombardi OT  9/8/2022

## 2022-09-08 NOTE — PLAN OF CARE
Goal Outcome Evaluation:  Plan of Care Reviewed With: patient        Progress: improving  Progressing with therapies. Continue with plan of care.   Fall, initial encounter

## 2022-09-08 NOTE — THERAPY TREATMENT NOTE
Inpatient Rehabilitation - Physical Therapy Treatment Note        [facility]     Patient Name: Eloina Batista  : 1940  MRN: [id number]    Today's Date: 2022                    Admit Date: 2022      Visit Dx:   No diagnosis found.    Patient Active Problem List   Diagnosis   • Chest pain   • CAD (coronary artery disease)   • Essential hypertension   • Hyperlipidemia   • Sebaceous cyst   • Left displaced femoral neck fracture (HCC)   • Fall, initial encounter   • Atrial fibrillation with rapid ventricular response (HCC)   • Stage 3a chronic kidney disease (HCC)   • S/p left hip fracture       Past Medical History:   Diagnosis Date   • Arthritis    • Coronary artery disease    • GERD (gastroesophageal reflux disease)    • Hyperlipidemia    • Hypertension        Past Surgical History:   Procedure Laterality Date   • APPENDECTOMY     • BLADDER REPAIR     • CARDIAC CATHETERIZATION     • CARDIAC CATHETERIZATION N/A 3/22/2017    Procedure: Left Heart Cath;  Surgeon: Alex Blanco MD;  Location: [facility] INVASIVE LOCATION;  Service:    • CARDIAC SURGERY     • CHOLECYSTECTOMY     • CORONARY ARTERY BYPASS GRAFT     • CORONARY STENT PLACEMENT     • HERNIA REPAIR     • HIP HEMIARTHROPLASTY Left 2022    Procedure: HIP HEMIARTHROPLASTY LEFT;  Surgeon: Jeramy Reid Jr., MD;  Location: [facility] OR;  Service: Orthopedics;  Laterality: Left;   • HYSTERECTOMY         PT ASSESSMENT (last 12 hours)     IRF PT Evaluation and Treatment     Row Name 22 1511 22 1419       PT Time and Intention    Document Type daily treatment  1st  session  -LL daily treatment  PM session  -RG    Mode of Treatment individual therapy;physical therapy  -LL individual therapy;physical therapy  -RG    Patient/Family/Caregiver Comments/Observations Patient & RN in agreement for participation with PT this date.  -LL Pt and nursing in agreement for skilled PT on this date.  Pts c/o feeling dizzy. Pts BP was 78/55 nursing  aware.  -RG    Row Name 09/08/22 1511 09/08/22 1419       General Information    Patient Profile Reviewed yes  -LL yes  -RG    Existing Precautions/Restrictions fall;left;hip, posterior  -LL fall;left;hip, posterior  -RG    Row Name 09/08/22 1511 09/08/22 1419       Cognition/Psychosocial    Affect/Mental Status (Cognition) WFL  -LL WFL  -RG    Orientation Status (Cognition) oriented x 4  -LL oriented x 4  -RG    Follows Commands (Cognition) follows one-step commands;verbal cues/prompting required  -LL follows one-step commands;verbal cues/prompting required  -RG    Personal Safety Interventions gait belt;nonskid shoes/slippers when out of bed;supervised activity  -LL gait belt;nonskid shoes/slippers when out of bed;fall prevention program maintained  -RG    Cognitive Function WFL  -LL WFL  -RG    Row Name 09/08/22 1419          Bed Mobility    Bed Mobility supine-sit;sit-supine  -RG     Sit-Supine [place] (Bed Mobility) --  w/ LE's  -RG     Row Name 09/08/22 1511 09/08/22 1419       Transfer Assessment/Treatment    Transfers sit-stand transfer;bed-chair transfer;stand-sit transfer;car transfer  -LL sit-stand transfer;bed-chair transfer;stand-sit transfer;car transfer  -RG    Row Name 09/08/22 1511 09/08/22 1419       Transfers    Sit-Stand [place] (Transfers) contact guard  -LL contact guard  -RG    Stand-Sit [place] (Transfers) contact guard  -LL contact guard  -RG    Row Name 09/08/22 1419          Chair-Bed Transfer    Assistive Device (Chair-Bed Transfers) --  bedrail  -RG     Row Name 09/08/22 1511 09/08/22 1419       Sit-Stand Transfer    Assistive Device (Sit-Stand Transfers) wheelchair  -LL wheelchair  -RG    Row Name 09/08/22 1511 09/08/22 1419       Stand-Sit Transfer    Assistive Device (Stand-Sit Transfers) wheelchair  -LL wheelchair  -RG    Row Name 09/08/22 1419          Car Transfer    Type (Car Transfer) stand pivot/stand step  -RG     [place] Level (Car Transfer) contact  guard;verbal cues;nonverbal cues (demo/gesture)  -     Row Name 09/08/22 1511 09/08/22 1419       Gait/Stairs (Locomotion)    [place] Level (Gait) contact guard;verbal cues;nonverbal cues (demo/gesture);standby assist  -LL --    Assistive Device (Gait) walker, front-wheeled  -LL --    Distance in Feet (Gait) 120'  -LL --    Pattern (Gait) step-to  -LL --    Deviations/Abnormal Patterns (Gait) antalgic;gait speed decreased;ifeanyi decreased;weight shifting decreased  -LL --    Left Sided Gait Deviations heel strike decreased  -LL --    Comment, (Gait/Stairs) -- Did not ambulate 2nd session due to low BP  -[facility] Name 09/08/22 1511 09/08/22 1419       Safety Issues, Functional Mobility    Impairments Affecting Function (Mobility) balance;endurance/activity tolerance;pain;postural/trunk control;range of motion (ROM);strength  -LL balance;endurance/activity tolerance;pain;postural/trunk control;range of motion (ROM);strength  -[facility] Name 09/08/22 1511 09/08/22 1419       Hip (Therapeutic Exercise)    Hip Strengthening (Therapeutic Exercise) bilateral;flexion;extension;aBduction;aDduction;marching while seated;sitting;resistance band;green  -LL bilateral;flexion;aBduction;aDduction;marching while seated;sitting;10 repetitions;2 sets  -[facility] Name 09/08/22 1511 09/08/22 1419       Knee (Therapeutic Exercise)    Knee Strengthening (Therapeutic Exercise) bilateral;flexion;extension;marching while seated;LAQ (long arc quad);hamstring curls;sitting;resistance band;green  -LL bilateral;flexion;extension;marching while seated;LAQ (long arc quad);sitting;green;10 repetitions;2 sets  -[facility] Name 09/08/22 1511 09/08/22 1419       Ankle (Therapeutic Exercise)    Ankle Strengthening (Therapeutic Exercise) bilateral;dorsiflexion;plantarflexion;sitting  -LL bilateral;dorsiflexion;plantarflexion;sitting;10 repetitions;2 sets  -[facility] Name 09/08/22 1511 09/08/22 1419       Positioning and Restraints     Pre-Treatment Position --  WC w/ OT  -LL sitting in chair/recliner  -RG    Post Treatment Position wheelchair  -LL wheelchair  -RG    In Wheelchair sitting;with PT  -LL notified nsg;sitting;call light within reach;encouraged to call for assist;legs elevated  -RG    Row Name 09/08/22 1419          Vital Signs    Intra Systolic BP Rehab 78  -RG     Intra Treatment Diastolic BP 55  -RG     Intra Patient Position Sitting  -RG     Row Name 09/08/22 1511 09/08/22 1419       Daily Progress Summary (PT)    Impairments Still Limiting Function (PT) functional activity tolerance impairment;pain;strength deficit  -LL functional activity tolerance impairment;pain;strength deficit  -RG          User Key  (r) = Recorded By, (t) = Taken By, (c) = Cosigned By    Initials Name Provider Type    LL Roberta Sanchez, AUSTYN Physical Therapist Assistant    Duc Baker PTA Physical Therapist Assistant              Wound 08/18/22 2300 Left palm Skin Tear (Active)   Dressing Appearance dry;intact 09/08/22 0819   Closure None 09/08/22 0819   Base clean;dry;pink;red 09/08/22 0819   Drainage Amount none 09/08/22 0819   Care, Wound antimicrobial agent applied 09/08/22 0819   Dressing Care dressing changed 09/08/22 0819       Wound Left anterior greater trochanter (Active)   Dressing Appearance dry;intact 09/08/22 0819   Base clean;dry;pink;red 09/08/22 0819     Physical Therapy Education                 Title: PT OT SLP Therapies (Done)     Topic: Physical Therapy (Done)     Point: Mobility training (Done)     Learning Progress Summary           Patient Acceptance, E,D, VU,NR by  at 9/8/2022 1515      Show all documentation for this point (10)                 Point: Home exercise program (Done)     Learning Progress Summary           Patient Acceptance, E,D, VU,NR by  at 9/8/2022 1515      Show all documentation for this point (10)                 Point: Body mechanics (Done)     Learning Progress Summary           Patient Acceptance,  E,D, VU,NR by  at 9/8/2022 1515      Show all documentation for this point (10)                 Point: Precautions (Done)     Learning Progress Summary           Patient Acceptance, E,D, VU,NR by  at 9/8/2022 1515      Show all documentation for this point (10)                             User Key     Initials Effective Dates Name Provider Type [facility] 05/02/16 -  Roberta Sanchez PTA Physical Therapist Assistant PT                PT Recommendation and Plan          Daily Progress Summary (PT)  Impairments Still Limiting Function (PT): functional activity tolerance impairment, pain, strength deficit               Time Calculation:      PT Charges     Row Name 09/08/22 1516 09/08/22 1435          Time Calculation    Start Time 1000  - 1045  -RG     Stop Time 1045  -LL 1130  -RG     Time Calculation (min) 45 min  -LL 45 min  -RG     PT Received On -- 09/08/22  -RG            Time Calculation- PT    Total Timed Code Minutes- PT 45 minute(s)  -LL 45 minute(s)  -RG           User Key  (r) = Recorded By, (t) = Taken By, (c) = Cosigned By    Initials Name Provider Type     Roberta Sanchez PTA Physical Therapist Assistant    RG Duc Dahl PTA Physical Therapist Assistant                Therapy Charges for Today     Code Description Service Date Service Provider Modifiers Qty    50463232921 HC PT THERAPEUTIC ACT EA 15 MIN 9/7/2022 Roberta Sanchez, AUSTYN GP, CQ 2    28036627628 HC PT THER PROC EA 15 MIN 9/7/2022 Roberta Sanchez, AUSTYN GP, CQ 2    93178354337 HC GAIT TRAINING EA 15 MIN 9/8/2022 Roberta Sanchez, AUSTYN GP, CQ 1    75313305984 HC PT THERAPEUTIC ACT EA 15 MIN 9/8/2022 Roberta Sanchez, PTA GP, CQ 1    05995216046 HC PT THER PROC EA 15 MIN 9/8/2022 Roberta Sanchez, PTA GP, CQ 1            PT G-Codes  AM-PAC 6 Clicks Score (PT): 6      Mirtha Sanchez PTA  9/8/2022

## 2022-09-09 NOTE — SIGNIFICANT NOTE
09/09/22 0951   Plan   Plan Contacted [facility] [phone] per Jacquelin about discharge.  PT to visit pt tomorrow 9-10-22 or Monday 9-12-22 for admission.  Pt will receive HH PT/OT.  Informed Jacquelin pt is going to son Jeramy's home temporarily and she has the address.  Faxed ambulatory referral for home health with face to face to [facility] via [facility].  Discharge summary to be faxed when available.  Faxed DME orders for hospital bed, wheelchair and bedside commode [facility] via [employer].   Final Discharge Disposition Code 06 - home with home health care

## 2022-09-09 NOTE — THERAPY DISCHARGE NOTE
Inpatient Rehabilitation - Physical Therapy Discharge/Discharge   [facility]     Patient Name: Eloina Batista  : 1940  MRN: [id number]  Today's Date: 2022                Admit Date: 2022    Visit Dx:    ICD-10-CM ICD-9-CM   1. S/p left hip fracture  Z87.81 V15.51     Patient Active Problem List   Diagnosis   • Chest pain   • CAD (coronary artery disease)   • Essential hypertension   • Hyperlipidemia   • Sebaceous cyst   • Left displaced femoral neck fracture (HCC)   • Fall, initial encounter   • Atrial fibrillation with rapid ventricular response (HCC)   • Stage 3a chronic kidney disease (HCC)   • S/p left hip fracture     Past Medical History:   Diagnosis Date   • Arthritis    • Coronary artery disease    • GERD (gastroesophageal reflux disease)    • Hyperlipidemia    • Hypertension      Past Surgical History:   Procedure Laterality Date   • APPENDECTOMY     • BLADDER REPAIR     • CARDIAC CATHETERIZATION     • CARDIAC CATHETERIZATION N/A 3/22/2017    Procedure: Left Heart Cath;  Surgeon: Alex Blanco MD;  Location: [facility] INVASIVE LOCATION;  Service:    • CARDIAC SURGERY     • CHOLECYSTECTOMY     • CORONARY ARTERY BYPASS GRAFT     • CORONARY STENT PLACEMENT     • HERNIA REPAIR     • HIP HEMIARTHROPLASTY Left 2022    Procedure: HIP HEMIARTHROPLASTY LEFT;  Surgeon: Jeramy Reid Jr., MD;  Location: [facility] OR;  Service: Orthopedics;  Laterality: Left;   • HYSTERECTOMY         PT ASSESSMENT (last 12 hours)     IRF PT Evaluation and Treatment    No documentation.                 Physical Therapy Education                 Title: PT OT SLP Therapies (Done)     Topic: Physical Therapy (Done)     Point: Mobility training (Done)     Learning Progress Summary           Patient Acceptance, E,D, VU,NR by  at 2022 1515      Show all documentation for this point (10)                 Point: Home exercise program (Done)     Learning Progress Summary           Patient Acceptance, E,D, VU,NR by  LL at 9/8/2022 1515      Show all documentation for this point (10)                 Point: Body mechanics (Done)     Learning Progress Summary           Patient Acceptance, E,D, VU,NR by  at 9/8/2022 1515      Show all documentation for this point (10)                 Point: Precautions (Done)     Learning Progress Summary           Patient Acceptance, E,D, VU,NR by  at 9/8/2022 1515      Show all documentation for this point (10)                             User Key     Initials Effective Dates Name Provider Type Discipline     05/02/16 -  Roberta Sanchez PTA Physical Therapist Assistant PT                PT Recommendation and Plan  Planned Therapy Interventions (PT): balance training, bed mobility training, gait training, patient/family education, postural re-education, ROM (range of motion), strengthening, transfer training     Daily Progress Summary (PT)  Recommendations (PT): continue PT         Time Calculation:           PT G-Codes  AM-PAC 6 Clicks Score (PT): 6    PT Discharge Summary  Reason for Discharge: Discharge from facility  Outcomes Achieved: Patient able to partially acheive established goals  Discharge Destination: Home with home health, Home with assist (patient and family given safety info and written HEP with thera band. discussed fall risk, safety, and plan for therapy at home.)    Ary Aguilera, PT  9/9/2022

## 2022-09-09 NOTE — PROGRESS NOTES
Patient Assessment Instrument  Quality Indicators - Discharge    Section GG. Self-Care Performance     Eating: Patient completed the activities by him/herself with no assistance from  a helper.   Oral Hygiene: [place] sets up or cleans up; patient completes activity. [place]  assists only prior to or following the activity.   Toileting Hygiene: : [place] provides verbal cues and/or touching/steadying  and/or contact guard assistance as patient completes activity.   Shower/Bathe Self: [place] provides verbal cues and/or touching/steadying and/or  contact guard assistance as patient completes activity.   Upper Body Dressing: [place] sets up or cleans up; patient completes activity.  [place] assists only prior to or following the activity.   Lower Body Dressing: [place] does less than half the effort. [place] lifts, holds  or supports trunk or limbs but provides less than half the effort.   Putting On/Taking Off Footwear: [place] does less than half the effort. [place]  lifts, holds or supports trunk or limbs but provides less than half the effort.    Section GG. Mobility Performance      Section J. Health Conditions Discharge      Section M. Skin Conditions Discharge      . Current Number of Unhealed Pressure Ulcers      Section N. Medication    Signed by: Karen Lombardi OT

## 2022-09-09 NOTE — PLAN OF CARE
Goal Outcome Evaluation:  Plan of Care Reviewed With: patient        Progress: improving  Outcome Evaluation: Pt progressing with therapies, cont POC.

## 2022-09-09 NOTE — PROGRESS NOTES
Occupational Therapy: Individual: 15 minutes.    Physical Therapy:    Speech Language Pathology:    Signed by: Karen Lombardi OT

## 2022-09-09 NOTE — PROGRESS NOTES
Rehabilitation Nursing  Inpatient Rehabilitation Plan of Care Note    Plan of Care  Copy from [facility]    Bed/Chair/Wheelchair (Active)  Current Status (8/25/2022 12:00:00 AM): Increase in physical activity  Weekly Goal: Joint mobility maintained  Discharge Goal: Demonstrate use of adaptive equipment.    Safety    Potential for Injury (Active)  Current Status (8/25/2022 12:00:00 AM): No falls this shift  Weekly Goal: No falls this hospital stay  Discharge Goal: [place] measures to prevent injury    Signed by: Monty Holbrook RN

## 2022-09-09 NOTE — THERAPY DISCHARGE NOTE
Inpatient Rehabilitation - IRF Occupational Therapy Treatment Note/Discharge   [facility]     Patient Name: Eloina Batista  : 1940  MRN: [id number]  Today's Date: 2022               Admit Date: 2022       ICD-10-CM ICD-9-CM   1. S/p left hip fracture  Z87.81 V15.51     Patient Active Problem List   Diagnosis   • Chest pain   • CAD (coronary artery disease)   • Essential hypertension   • Hyperlipidemia   • Sebaceous cyst   • Left displaced femoral neck fracture (HCC)   • Fall, initial encounter   • Atrial fibrillation with rapid ventricular response (HCC)   • Stage 3a chronic kidney disease (HCC)   • S/p left hip fracture     Past Medical History:   Diagnosis Date   • Arthritis    • Coronary artery disease    • GERD (gastroesophageal reflux disease)    • Hyperlipidemia    • Hypertension      Past Surgical History:   Procedure Laterality Date   • APPENDECTOMY     • BLADDER REPAIR     • CARDIAC CATHETERIZATION     • CARDIAC CATHETERIZATION N/A 3/22/2017    Procedure: Left Heart Cath;  Surgeon: Alex Blanco MD;  Location: [facility] INVASIVE LOCATION;  Service:    • CARDIAC SURGERY     • CHOLECYSTECTOMY     • CORONARY ARTERY BYPASS GRAFT     • CORONARY STENT PLACEMENT     • HERNIA REPAIR     • HIP HEMIARTHROPLASTY Left 2022    Procedure: HIP HEMIARTHROPLASTY LEFT;  Surgeon: Jeramy Reid Jr., MD;  [facility];  Service: Orthopedics;  Laterality: Left;   • HYSTERECTOMY         IRF OT ASSESSMENT FLOWSHEET (last 12 hours)     IRF OT Evaluation and Treatment     Row Name 22 1318          OT Time and Intention    Document Type discharge evaluation  -TM     Mode of Treatment occupational therapy  -TM     Symptoms Noted During/After Treatment none  -TM     Row Name 22 1600          General Information    General Observations of Patient Pt's  present for pt/family training education with pt/ verb understanding of all ADL/transfer assistance levels. Recommended 24  hour assistance/supervision with pt/ verb understanding.  -TM     Existing Precautions/Restrictions fall;left;hip, posterior  -TM     Row Name 09/09/22 1318          Cognition/Psychosocial    Orientation Status (Cognition) oriented x 4  -TM     Follows Commands (Cognition) follows one-step commands;verbal cues/prompting required;WFL  -TM     Row Name 09/09/22 1318          Bathing    Assistive Device (Bathing) long-handled sponge  -TM     Comment (Bathing) Siddharth; recommended sponge bathing with assistance for safety with pt/ verb understanding.  -TM     Row Name 09/09/22 1318          Upper Body Dressing    Comment (Upper Body Dressing) setup  -TM     Row Name 09/09/22 1318          Lower Body Dressing    Comment (Lower Body Dressing) modA  -TM     Row Name 09/09/22 1318          Grooming    Comment (Grooming) setup  -TM     Row Name 09/09/22 1318          Toileting    [place] Level (Toileting) contact guard assist;verbal cues  -TM     Assistive Device Use (Toileting) raised toilet seat;grab bar/safety frame  -TM     Position (Toileting) supported sitting  -TM     Row Name 09/09/22 1318          Self-Feeding    [place] Level (Self-Feeding) modified independence  -TM     Position (Self-Feeding) supported sitting  -TM     Row Name 09/09/22 1318          Transfers    [place] Level (Toilet Transfer) contact guard;verbal cues  -TM     Assistive Device (Toilet Transfer) grab bars/safety frame;raised toilet seat;wheelchair  -TM     Row Name 09/09/22 1318          UB Dressing Goal 2 (OT-IRF)    Progress/Outcomes (UB Dressing Goal 2, OT-IRF) goal met  -TM     Row Name 09/09/22 1318          LB Dressing Goal 1 (OT-IRF)    Progress/Outcomes (LB Dressing Goal 1, OT-IRF) goal met  -TM           User Key  (r) = Recorded By, (t) = Taken By, (c) = Cosigned By    Initials Name Effective Dates    TM Karen Lombardi, OT 06/16/21 -               Wound Left anterior greater trochanter (Active)    Dressing Appearance dry;intact 09/09/22 0805   Base clean;dry;pink;red 09/08/22 1928       Occupational Therapy Education                 Title: PT OT SLP Therapies (Done)     Topic: Occupational Therapy (Resolved)     Point: ADL training (Resolved)     Description:   Instruct learner(s) on proper safety adaptation and remediation techniques during self care or transfers.   Instruct in proper use of assistive devices.              Learning Progress Summary           Patient Acceptance, E,D, VU,NR by TM at 9/9/2022 1316   Significant Other Acceptance, E,D, VU,NR by TM at 9/9/2022 1316      Show all documentation for this point (11)                 Point: Home exercise program (Resolved)     Description:   Instruct learner(s) on appropriate technique for monitoring, assisting and/or progressing therapeutic exercises/activities.              Learner Progress:  Not documented in this visit.          Point: Precautions (Resolved)     Description:   Instruct learner(s) on prescribed precautions during self-care and functional transfers.              Learning Progress Summary           Patient Acceptance, E,D, VU,NR by TM at 9/9/2022 1316   Significant Other Acceptance, E,D, VU,NR by TM at 9/9/2022 1316      Show all documentation for this point (11)                 Point: Body mechanics (Resolved)     Description:   Instruct learner(s) on proper positioning and spine alignment during self-care, functional mobility activities and/or exercises.              Learner Progress:  Not documented in this visit.                      User Key     Initials Effective Dates Name Provider Type Discipline     06/16/21 -  Karen Lombardi OT Occupational Therapist OT                OT Recommendation and Plan  Planned Therapy Interventions (OT): activity tolerance training, BADL retraining, adaptive equipment training, IADL retraining, occupation/activity based interventions, patient/caregiver education/training, ROM/therapeutic  exercise, strengthening exercise, transfer/mobility retraining           OT IRF GOALS     Row Name 09/09/22 1318 09/02/22 1508 09/02/22 1502       UB Dressing Goal 1 (OT-IRF)    Progress/Outcomes (UB Dressing Goal 1, OT-IRF) -- -- goal met  -TM       UB Dressing Goal 2 (OT-IRF)    Progress/Outcomes (UB Dressing Goal 2, OT-IRF) goal met  -TM -- goal ongoing  -TM       LB Dressing Goal 1 (OT-IRF)    [place] (LB Dressing Goal 1, OT-IRF) -- moderate assist (50-74% patient effort)  -TM --    Time Frame (LB Dressing Goal 1, OT-IRF) -- short-term goal (STG)  -TM --    Progress/Outcomes (LB Dressing Goal 1, OT-IRF) goal met  -TM -- goal met  -TM       Toileting Goal 1 (OT-IRF)    Progress/Outcomes (Toileting Goal 1, OT-IRF) -- -- goal met  -TM       Toileting Goal 2 (OT-IRF)    Progress/Outcomes (Toileting Goal 2, OT-IRF) -- -- goal met  -TM          User Key  (r) = Recorded By, (t) = Taken By, (c) = Cosigned By    Initials Name Provider Type     Karen Lombardi OT Occupational Therapist                    Time Calculation:    Time Calculation- OT     Row Name 09/09/22 1318             Time Calculation- OT    Total Timed Code Minutes- OT 15 minute(s)  -TM      OT Non-Billable Time (min) 30 min  -TM            User Key  (r) = Recorded By, (t) = Taken By, (c) = Cosigned By    Initials Name Provider Type     Karen Lombardi OT Occupational Therapist                Therapy Charges for Today     Code Description Service Date Service Provider Modifiers Qty    26752272885 HC OT SELF CARE/MGMT/TRAIN EA 15 MIN 9/8/2022 Karen Lombardi OT GO 2    66151698740 HC OT THERAPEUTIC ACT EA 15 MIN 9/8/2022 Karen Lombardi OT GO 3    37783802585 HC OT THER PROC EA 15 MIN 9/8/2022 Karen Lombardi OT GO 1    09970891494 HC OT SELF CARE/MGMT/TRAIN EA 15 MIN 9/9/2022 Maria C, Karen Caren, OT GO 1               OT Discharge Summary  Reason for Discharge: Discharge from facility  Outcomes Achieved: Refer  to plan of care for updates on goals achieved  Discharge Destination: Home with home health, Home with assist    Karen Lombardi, OT  9/9/2022

## 2022-09-09 NOTE — PROGRESS NOTES
Patient Assessment Instrument  Quality Indicators - Discharge    Section GG. Self-Care Performance      Section GG. Mobility Performance      Section J. Health Conditions Discharge      Section M. Skin Conditions Discharge  Unhealed Pressure Ulcer(s)/Injurie(s) at Stage 1 or Higher:  No    . Current Number of Unhealed Pressure Ulcers      Section N. Medication    Signed by: Chelo Christensen Nurse

## 2022-09-09 NOTE — SIGNIFICANT NOTE
09/09/22 1150   Plan   Plan Spoke to pt and spouse about discharge and [facility] PT to visit tomorrow 9-10-22 or Monday 9-12-22 to start care.  Pt is going to son Jeramy's home today to stay temporarily.  Pt's friend will be staying with her during the day.  Spouse to contact [facility] when he is ready for delivery of hospital bed; W/C and bedside commode have been delivered.  Spouse will transport pt to son's home.  Pt has good family support.  Faxed discharge summary to [facility] via epic.   Patient/Family in Agreement with Plan yes   Final Discharge Disposition Code 06 - home with home health care

## 2022-09-09 NOTE — PROGRESS NOTES
Patient Assessment Instrument  Quality Indicators - Discharge    Section GG. Self-Care Performance      Section GG. Mobility Performance      Section J. Health Conditions Discharge  Fall(s) Since Admission:  No    Section M. Skin Conditions Discharge      . Current Number of Unhealed Pressure Ulcers      Section N. Medication    Medication Intervention: N/A - There were no potential clinically significant  medication issues identified since admission or patient is not taking any  medications.    Signed by: Kristine Walls, Supervisor

## 2022-09-09 NOTE — DISCHARGE SUMMARY
[facility] HOSPITALISTS DISCHARGE SUMMARY    Patient Identification:  Name:  Eloina Batista  Age:  82 y.o.  Sex:  female  :  1940  MRN:  [id number]  Visit Number:  [id number]    Date of Admission: 2022  Date of Discharge:  2022     PCP: Alex Og MD    DISCHARGE DIAGNOSIS  Status post left hip fracture with ORIF  Atrial fibrillation with RVR  CAD with history of CABG and stents  Hypertension  Acute blood loss anemia  Osteoarthritis  History of lumbar surgery in April    CONSULTS       PROCEDURES PERFORMED      HOSPITAL COURSE  Patient is a 82 y.o. female presented to [facility] inpatient physical rehab in transfer from [facility].  Patient 82-year-old female with a history of coronary artery disease history of CABG and heart stents, history of lumbar surgery in April, osteoarthritis, GERD, hyperlipidemia, hypertension, atrial fibrillation.  Patient states she had a fall at home and suffered left hip fracture.  Patient was taken to [facility] in [place] where she had open reduction internal fixation of the hip via hip hemiarthroplasty.  Patient had difficult postoperative course secondary to severe pain from the hip as well as atrial fibrillation RVR.  Patient did have titration of her medications to improve her ventricular rate during the admission and was still having significant pain and difficulty with ambulation and was thought to be a good candidate for inpatient physical rehab.    Status post left hip fracture with ORIF--at the time of admission patient requiring maximum assistance for bathing; moderate assistance for upper body dressing; dependent for lower body dressing; set up for grooming; dependent for toileting.  Patient also required maximum assistance for bed mobility; maximum assistance for transfers; was unable to ambulate at the beginning of admission.  At the time of discharge patient requiring contact-guard for transfers;  ambulating 120 feet with front wheel walker and contact-guard.  Patient required set up for upper body dressing; moderate assistance for lower body dressing; set up for grooming; patient pains been controlled with oxycodone and is done reasonably well with his during her admission admission.      Atrial fibrillation--during admission we were able to do to decrease the amount of Toprol-XL she had been on 100 mg twice a day we decreased this to 125 mg daily.  Have continued patient on Eliquis and she is done reasonably well.  Recommend follow-up with her PCP in the near future    CAD has been stable throughout the admission has past history of CABG and PCI.  Continue high-dose statin therapy along with Plavix.  Patient is on Eliquis    Hypertension very well controlled patient is on beta-blocker and angiotensin receptor blocker therapy and we have had no significant issues during the admission    Anemia stable    Abrasion to left hand patient has had this treated locally during admission and is this been dressed this should heal without any other intervention.      VITAL SIGNS:  Temp:  [97.9 °F (36.6 °C)] 97.9 °F (36.6 °C)  Heart Rate:  [80-86] 86  Resp:  [20] 20  BP: (130-132)/(76-80) 130/80  SpO2:  [97 %] 97 %  on   ;   Device (Oxygen Therapy): room air    Body mass index is 28.29 kg/m².  Wt Readings from Last 3 Encounters:   08/25/22 77.1 kg (170 lb)   08/21/22 77.5 kg (170 lb 13.7 oz)   04/13/21 77.1 kg (170 lb)       PHYSICAL EXAM:  Constitutional: No acute distress  HEENT: Normocephalic atraumatic  Neck: Supple  Cardiovascular: Irregular irregular, ventricular rate controlled in the 90s  Pulmonary/Chest: Clear to auscultation  Abdominal: Positive bowel sounds soft.   Musculoskeletal: Status post hip repair  Neurological: No focal deficits  Skin: No rash  Peripheral vascular: No edema  Genitourinary::    DISCHARGE DISPOSITION   Stable    DISCHARGE MEDICATIONS:     Discharge Medications      New Medications       Instructions Start Date   apixaban 5 MG tablet tablet  Commonly known as: ELIQUIS   5 mg, Oral, Every 12 Hours Scheduled      bisacodyl 5 MG EC tablet  Commonly known as: DULCOLAX   10 mg, Oral, Daily PRN      calcium carbonate (oyster shell) 500 MG tablet tablet   500 mg, Oral, Daily   Start Date: September 10, 2022     cyclobenzaprine 5 MG tablet  Commonly known as: FLEXERIL   5 mg, Oral, 3 Times Daily PRN      multivitamin tablet tablet   1 tablet, Oral, Daily   Start Date: September 10, 2022     neomycin-bacitracin-polymyxin 5-400-5000 ointment   1 application, Topical, Every 12 Hours Scheduled      nitroglycerin 0.4 MG SL tablet  Commonly known as: NITROSTAT   0.4 mg, Sublingual, Every 5 Minutes PRN, Take no more than 3 doses in 15 minutes.      ondansetron 4 MG tablet  Commonly known as: ZOFRAN   4 mg, Oral, Every 6 Hours PRN      oxyCODONE 5 MG immediate release tablet  Commonly known as: ROXICODONE   5 mg, Oral, Every 6 Hours PRN      polyethylene glycol 17 g packet  Commonly known as: MIRALAX   17 g, Oral, Daily   Start Date: September 10, 2022     pyridoxine 25 MG tablet  Commonly known as: VITAMIN B-6   25 mg, Oral, 3 Times Daily         Changes to Medications      Instructions Start Date   metoprolol succinate  MG 24 hr tablet  Commonly known as: TOPROL-XL  What changed: Another medication with the same name was changed. Make sure you understand how and when to take each.   100 mg, Oral, Daily, am      metoprolol succinate XL 25 MG 24 hr tablet  Commonly known as: TOPROL-XL  What changed:   · medication strength  · how much to take  · when to take this   25 mg, Oral, Every 24 Hours Scheduled      valsartan 80 MG tablet  Commonly known as: DIOVAN  What changed:   · medication strength  · how much to take  · when to take this   80 mg, Oral, Every 24 Hours Scheduled   Start Date: September 10, 2022        Continue These Medications      Instructions Start Date   atorvastatin 80 MG tablet  Commonly  known as: LIPITOR   80 mg, Oral, Nightly      cetirizine 10 MG tablet  Commonly known as: zyrTEC   10 mg, Oral, Daily      cholecalciferol 25 MCG (1000 UT) tablet  Commonly known as: VITAMIN D3   1,000 Units, Oral, Daily With Lunch      clopidogrel 75 MG tablet  Commonly known as: PLAVIX   75 mg, Oral, Daily      cycloSPORINE 0.05 % ophthalmic emulsion  Commonly known as: RESTASIS   1 drop, Both Eyes, 2 Times Daily      famotidine 20 MG tablet  Commonly known as: PEPCID   20 mg, Oral, 2 Times Daily      vitamin B-12 1000 MCG tablet  Commonly known as: CYANOCOBALAMIN   1,000 mcg, Oral, Daily With Lunch      Vitamin E 180 MG (400 UNIT) capsule capsule   180 mg, Oral, 2 Times Daily         Stop These Medications    amLODIPine 5 MG tablet  Commonly known as: NORVASC     aspirin 81 MG EC tablet     gabapentin 100 MG capsule  Commonly known as: NEURONTIN     isosorbide mononitrate 120 MG 24 hr tablet  Commonly known as: IMDUR     ranolazine 500 MG 12 hr tablet  Commonly known as: RANEXA     traMADol 50 MG tablet  Commonly known as: ULTRAM             Your medication list      START taking these medications      Instructions Last Dose Given Next Dose Due   apixaban 5 MG tablet tablet  Commonly known as: ELIQUIS      Take 1 tablet by mouth Every 12 (Twelve) Hours for 30 days. Indications: Atrial Fibrillation       bisacodyl 5 MG EC tablet  Commonly known as: DULCOLAX      Take 2 tablets by mouth Daily As Needed for Constipation for up to 14 days.       calcium carbonate (oyster shell) 500 MG tablet tablet  Start taking on: September 10, 2022      Take 1 tablet by mouth Daily for 30 days.       cyclobenzaprine 5 MG tablet  Commonly known as: FLEXERIL      Take 1 tablet by mouth 3 (Three) Times a Day As Needed for Muscle Spasms for up to 10 days.       multivitamin tablet tablet  Start taking on: September 10, 2022      Take 1 tablet by mouth Daily for 30 days.       neomycin-bacitracin-polymyxin 5-400-5000 ointment       Apply 1 application topically to the appropriate area as directed Every 12 (Twelve) Hours.       nitroglycerin 0.4 MG SL tablet  Commonly known as: NITROSTAT      Place 1 tablet under the tongue Every 5 (Five) Minutes As Needed for Chest Pain for up to 30 days. Take no more than 3 doses in 15 minutes.       ondansetron 4 MG tablet  Commonly known as: ZOFRAN      Take 1 tablet by mouth Every 6 (Six) Hours As Needed for Nausea or Vomiting for up to 10 days.       oxyCODONE 5 MG immediate release tablet  Commonly known as: ROXICODONE      Take 1 tablet by mouth Every 6 (Six) Hours As Needed for Moderate Pain for up to 4 days.       polyethylene glycol 17 g packet  Commonly known as: MIRALAX  Start taking on: September 10, 2022      Take 17 g by mouth Daily for 15 days.       pyridoxine 25 MG tablet  Commonly known as: VITAMIN B-6      Take 1 tablet by mouth 3 (Three) Times a Day for 30 days.          CHANGE how you take these medications      Instructions Last Dose Given Next Dose Due   metoprolol succinate  MG 24 hr tablet  Commonly known as: TOPROL-XL  What changed: Another medication with the same name was changed. Make sure you understand how and when to take each.      Take 1 tablet by mouth Daily for 30 days. am       metoprolol succinate XL 25 MG 24 hr tablet  Commonly known as: TOPROL-XL  What changed:   · medication strength  · how much to take  · when to take this      Take 1 tablet by mouth Daily for 30 days.       valsartan 80 MG tablet  Commonly known as: DIOVAN  Start taking on: September 10, 2022  What changed:   · medication strength  · how much to take  · when to take this      Take 1 tablet by mouth Daily for 30 days.          CONTINUE taking these medications      Instructions Last Dose Given Next Dose Due   atorvastatin 80 MG tablet  Commonly known as: LIPITOR      Take 1 tablet by mouth Every Night for 30 days.       cetirizine 10 MG tablet  Commonly known as: zyrTEC      Take 1 tablet by  mouth Daily for 30 days.       cholecalciferol 25 MCG (1000 UT) tablet  Commonly known as: VITAMIN D3      Take 1 tablet by mouth Daily With Lunch for 30 days.       clopidogrel 75 MG tablet  Commonly known as: PLAVIX      Take 1 tablet by mouth Daily for 30 days.       cycloSPORINE 0.05 % ophthalmic emulsion  Commonly known as: RESTASIS      Administer 1 drop to both eyes 2 (Two) Times a Day.       famotidine 20 MG tablet  Commonly known as: PEPCID      Take 1 tablet by mouth 2 (Two) Times a Day for 30 days.       vitamin B-12 1000 MCG tablet  Commonly known as: CYANOCOBALAMIN      Take 1 tablet by mouth Daily With Lunch for 30 days.       Vitamin E 180 MG (400 UNIT) capsule capsule      Take 1 capsule by mouth 2 (Two) Times a Day for 30 days.          STOP taking these medications    amLODIPine 5 MG tablet  Commonly known as: NORVASC        aspirin 81 MG EC tablet        gabapentin 100 MG capsule  Commonly known as: NEURONTIN        isosorbide mononitrate 120 MG 24 hr tablet  Commonly known as: IMDUR        ranolazine 500 MG 12 hr tablet  Commonly known as: RANEXA        traMADol 50 MG tablet  Commonly known as: ULTRAM              Where to Get Your Medications      These medications were sent to [facility] - [place] - [address] [address] - [phone]  - [phone]   [address] [place] [address]    Phone: [phone]   · apixaban 5 MG tablet tablet  · atorvastatin 80 MG tablet  · bisacodyl 5 MG EC tablet  · calcium carbonate (oyster shell) 500 MG tablet tablet  · cetirizine 10 MG tablet  · cholecalciferol 25 MCG (1000 UT) tablet  · clopidogrel 75 MG tablet  · cyclobenzaprine 5 MG tablet  · famotidine 20 MG tablet  · metoprolol succinate  MG 24 hr tablet  · metoprolol succinate XL 25 MG 24 hr tablet  · multivitamin tablet tablet  · neomycin-bacitracin-polymyxin 5-400-5000 ointment  · nitroglycerin 0.4 MG SL tablet  · ondansetron 4 MG tablet  · oxyCODONE 5 MG immediate  release tablet  · polyethylene glycol 17 g packet  · pyridoxine 25 MG tablet  · valsartan 80 MG tablet  · vitamin B-12 1000 MCG tablet  · Vitamin E 180 MG (400 UNIT) capsule capsule         Diet Instructions     Diet: Cardiac      Discharge Diet: Cardiac        No future appointments.  Additional Instructions for the Follow-ups that You Need to Schedule     Ambulatory Referral to Home Health   As directed      Face to Face Visit Date: 9/9/2022    Follow-up provider for Plan of Care?: I treated the patient in an acute care facility and will not continue treatment after discharge.    Follow-up provider: ALEX OG [[id number]]    Reason/Clinical Findings: followup of hip fracture    Describe mobility limitations that make leaving home difficult: unsteady gait    Nursing/Therapeutic Services Requested: Physical Therapy Occupational Therapy    PT orders: Therapeutic exercise Gait Training Transfer training Strengthening    Weight Bearing Status: As Tolerated    Occupational orders: Activities of daily living Strengthening Home safety assessment    Frequency: 1 Week 1         Discharge Follow-up with PCP   As directed       Currently Documented PCP:    Alex Og MD    PCP Phone Number:    [phone]     Follow Up Details: one week---followup of hip fracture//atrial fibrillation         Discharge Follow-up with Specified Provider: jeramy reid---ortho [facility]; 1 Week   As directed      To: jeramy reid---ortho [facility]    Follow Up: 1 Week    Follow Up Details: followup of orif of hip fx            Contact information for follow-up providers     Alex Og MD. Go on 9/16/2022.    Specialty: Internal Medicine  Why: at 10:15 AM hospital follow up.  Contact information:  [address]  [facility] [address]  [phone]             Jeramy Reid Jr., MD. Go on 9/28/2022.    Specialty: Orthopedic Surgery  Why: at  2:10 PM for surgery follow up.  Contact information:  [address]  [address]  [facility] [address]  [phone]             Alex Og MD .    Specialty: Internal Medicine  Why: one week---followup of hip fracture//atrial fibrillation  Contact information:  [address]  [facility] [address]  [phone]                   Contact information for after-discharge care     Durable Medical Equipment     [facility] .    Services: Durable Medical Equipment, Durable Medical Equipment  Contact information:  [address] [facility] [address]  [phone]                 Home Medical Care     [facility] .    Services: Home Health Services, Home Rehabilitation  Contact information:  [address] [facility] [id number] [phone]                              TEST  RESULTS PENDING AT DISCHARGE       CODE STATUS  Code Status and Medical Interventions:   Ordered at: 08/25/22 2136     Level Of Support Discussed With:    Patient     Code Status (Patient has no pulse and is not breathing):    CPR (Attempt to Resuscitate)     Medical Interventions (Patient has pulse or is breathing):    Full Support     Release to patient:    Routine Release       Jose Fatima MD  [facility] Hospitalist  09/09/22  11:22 EDT    Please note that this discharge summary required less than 30 minutes to complete.

## 2022-09-09 NOTE — PROGRESS NOTES
Patient Assessment Instrument  Quality Indicators - Discharge    Section GG. Self-Care Performance      Section GG. Mobility Performance     Roll Left and Right: [place] provides verbal cues and/or touching/steadying  and/or contact guard assistance as patient completes activity. Assistance may be  provided throughout the activity or intermittently.   Sit to Lying: [place] provides verbal cues and/or touching/steadying and/or  contact guard assistance as patient completes activity. Assistance may be  provided throughout the activity or intermittently.   Lying to Sitting on Side of Bed: [place] provides verbal cues and/or  touching/steadying and/or contact guard assistance as patient completes  activity. Assistance may be provided throughout the activity or intermittently.   Sit to Stand: [place] provides verbal cues and/or touching/steadying and/or  contact guard assistance as patient completes activity. Assistance may be  provided throughout the activity or intermittently.   Chair/Bed to Chair Transfer: [place] provides verbal cues and/or  touching/steadying and/or contact guard assistance as patient completes  activity. Assistance may be provided throughout the activity or intermittently.   Toilet Transfer [place] provides verbal cues and/or touching/steadying and/or  contact guard assistance as patient completes activity. Assistance may be  provided throughout the activity or intermittently.   Car Transfer: [place] provides verbal cues and/or touching/steadying and/or  contact guard assistance as patient completes activity. Assistance may be  provided throughout the activity or intermittently.   Walk 10 Feet:   [place] provides verbal cues and/or touching/steadying and/or  contact guard assistance as patient completes activity. Assistance may be  provided throughout the activity or intermittently.  Walk 50 Feet with 2 Turns:   [place] provides verbal cues and/or  touching/steadying and/or contact guard assistance as  patient completes  activity. Assistance may be provided throughout the activity or intermittently.  Walk 150 Feet:   Not attempted due to medical or safety concerns.  Walking 10 Feet on Uneven Surfaces:   Not attempted due to medical or safety  concerns.  1 Step Over Curb or Up/Down Stair:   Not attempted due to medical or safety  concerns.  Picking up an Object:   Not attempted due to medical or safety concerns. Uses  Wheelchair and/or Scooter: No    Section J. Health Conditions Discharge      Section M. Skin Conditions Discharge      . Current Number of Unhealed Pressure Ulcers      Section N. Medication    Signed by: Ary Aguilera, Physical Therapist

## 2022-09-09 NOTE — SIGNIFICANT NOTE
09/09/22 0356   Plan   Plan Faxed discharge summary to [facility] via [facility].

## 2022-09-12 NOTE — PROGRESS NOTES
Patient Assessment Instrument  Quality Indicators - Discharge    Section GG. Self-Care Performance      Section GG. Mobility Performance     Roll Left and Right: [place] provides verbal cues and/or touching/steadying  and/or contact guard assistance as patient completes activity. Assistance may be  provided throughout the activity or intermittently.   Sit to Lying: [place] provides verbal cues and/or touching/steadying and/or  contact guard assistance as patient completes activity. Assistance may be  provided throughout the activity or intermittently.   Lying to Sitting on Side of Bed: [place] provides verbal cues and/or  touching/steadying and/or contact guard assistance as patient completes  activity. Assistance may be provided throughout the activity or intermittently.   Sit to Stand: [place] provides verbal cues and/or touching/steadying and/or  contact guard assistance as patient completes activity. Assistance may be  provided throughout the activity or intermittently.   Chair/Bed to Chair Transfer: [place] provides verbal cues and/or  touching/steadying and/or contact guard assistance as patient completes  activity. Assistance may be provided throughout the activity or intermittently.   Toilet Transfer [place] provides verbal cues and/or touching/steadying and/or  contact guard assistance as patient completes activity. Assistance may be  provided throughout the activity or intermittently.   Car Transfer: [place] provides verbal cues and/or touching/steadying and/or  contact guard assistance as patient completes activity. Assistance may be  provided throughout the activity or intermittently.   Walk 10 Feet:   [place] provides verbal cues and/or touching/steadying and/or  contact guard assistance as patient completes activity. Assistance may be  provided throughout the activity or intermittently.  Walk 50 Feet with 2 Turns:   [place] provides verbal cues and/or  touching/steadying and/or contact guard assistance as  patient completes  activity. Assistance may be provided throughout the activity or intermittently.  Walk 150 Feet:   Not attempted due to medical or safety concerns.  Walking 10 Feet on Uneven Surfaces:   Not attempted due to medical or safety  concerns.  1 Step Over Curb or Up/Down Stair:   Not attempted due to medical or safety  concerns.  Picking up an Object:   Not attempted due to medical or safety concerns. Uses  Wheelchair and/or Scooter: No    Section J. Health Conditions Discharge      Section M. Skin Conditions Discharge      . Current Number of Unhealed Pressure Ulcers      Section N. Medication    Signed by: Kristine Walls, Supervisor

## 2022-09-15 NOTE — PROGRESS NOTES
PPS CMG Coordinator  Inpatient Rehabilitation Discharge    Mode of Locomotion: Walking.    Discharge Against Medical Advice:  No.  Discharge Information  Patient Discharged Alive:  Yes  Discharge Destination/Living Setting: Home with Home Health Services  Diagnosis for Interruption/Death:    Impairment Group: 08.11 Status Post Unilateral Hip Fracture    Comorbidities: Rank Code      Description      1    D62       Acute posthemorrhagic anemia  2    I10       Essential (primary) hypertension  3    I25.10    Atherosclerotic heart disease of native                 coronary artery without angina pectoris  4    I48.91    Unspecified atrial fibrillation  5    Z79.01    Long term (current) use of anticoagulants  6    Z95.1     Presence of aortocoronary bypass graft  7    Z95.5     Presence of coronary angioplasty implant and                 graft  8    Z79.899   Other long term (current) drug therapy  9    M19.90    Unspecified osteoarthritis, unspecified site  10   Z79.02    Long term (current) use of                 antithrombotics/antiplatelets  11   E78.5     Hyperlipidemia, unspecified  12   K21.9     Gastro-esophageal reflux disease without                 esophagitis  13   W19.XXXA  Unspecified fall, initial encounter  14   Y93.9     Activity, unspecified  15   Y92.009   Unspecified place in unspecified                 non-institutional (private) residence as the                 place of occurrence of the external cause    Complications:      CUBA Bladder Accidents: 0  - Accidents.  Patient used medications/device this  shift.  8/25/2022 9:46:00 PM  Bladder Score = 7. Patient has not had an accident.  CUBA Bowel Accident: 0  - Accidents.  Bowel Score = 6. Patient has no accidents, but uses a device/medications. bowel  med    Signed by: Shayla Jeff Nurse

## 2022-09-15 NOTE — PROGRESS NOTES
Patient Assessment Instrument  Quality Indicators - Discharge    Section GG. Self-Care Performance      Section GG. Mobility Performance      Section J. Health Conditions Discharge      Section M. Skin Conditions Discharge  Unhealed Pressure Ulcer(s)/Injurie(s) at Stage 1 or Higher:  No    . Current Number of Unhealed Pressure Ulcers      Section N. Medication    Signed by: Nurse Mesfin

## 2022-10-24 NOTE — DISCHARGE INSTRUCTIONS
Follow-up with your primary care physician for further management.  Please continue take your blood pressure medication and keep a log of your blood pressure to bring during your next appointment.  Please return if symptoms recur.

## 2022-10-27 NOTE — ED PROVIDER NOTES
Subjective   History of Present Illness   Mrs. Duvall is an 82-year-old female with past medical history for hypertension presenting to the emergency department for chest pain.  Patient reports today she had a substernal chest pain nonradiating and nonexertional.  Symptoms lasted a few minutes and self resolved.  Currently asymptomatic.  She denies any recent trauma to the chest.  Denies any dyspnea.  No lower extremity swelling or pain.  Denies history of blood clots.  No blood thinner use.  Of note patient reports her blood pressure has been more elevated than usual.  She denies any headache, dizziness, blurry vision.      Review of Systems   Constitutional: Negative.  Negative for fever.   HENT: Negative.    Respiratory: Negative.    Cardiovascular: Positive for chest pain.   Gastrointestinal: Negative.  Negative for abdominal pain.   Endocrine: Negative.    Genitourinary: Negative.  Negative for dysuria.   Skin: Negative.    Neurological: Negative.    Psychiatric/Behavioral: Negative.    All other systems reviewed and are negative.      Past Medical History:   Diagnosis Date   • Arthritis    • Coronary artery disease    • GERD (gastroesophageal reflux disease)    • Hyperlipidemia    • Hypertension        Allergies   Allergen Reactions   • Clindamycin/Lincomycin GI Intolerance   • Doxycycline Unknown (See Comments)     See chart   • Latex Hives   • Penicillins Rash   • Sulfa Antibiotics Rash       Past Surgical History:   Procedure Laterality Date   • APPENDECTOMY     • BLADDER REPAIR     • CARDIAC CATHETERIZATION     • CARDIAC CATHETERIZATION N/A 3/22/2017    Procedure: Left Heart Cath;  Surgeon: Alex Blanco MD;  Location: [facility] INVASIVE LOCATION;  Service:    • CARDIAC SURGERY     • CHOLECYSTECTOMY     • CORONARY ARTERY BYPASS GRAFT     • CORONARY STENT PLACEMENT     • HERNIA REPAIR     • HIP HEMIARTHROPLASTY Left 8/19/2022    Procedure: HIP HEMIARTHROPLASTY LEFT;  Surgeon: Jeramy Reid Jr., MD;   Location: [facility];  Service: Orthopedics;  Laterality: Left;   • HYSTERECTOMY         Family History   Problem Relation Age of Onset   • Heart attack Mother    • Anuerysm Father    • Heart attack Sister    • Heart disease Sister    • Heart disease Brother        Social History     Socioeconomic History   • Marital status:    Tobacco Use   • Smoking status: Never   • Smokeless tobacco: Never   Substance and Sexual Activity   • Alcohol use: No   • Drug use: No   • Sexual activity: Defer           Objective   Physical Exam  Vitals and nursing note reviewed.   Constitutional:       General: She is not in acute distress.  HENT:      Head: Normocephalic and atraumatic.      Mouth/Throat:      Mouth: Mucous membranes are moist.      Pharynx: Oropharynx is clear.   Eyes:      Extraocular Movements: Extraocular movements intact.      Pupils: Pupils are equal, round, and reactive to light.   Neck:      Thyroid: No thyromegaly.   Pulmonary:      Effort: Pulmonary effort is normal. No tachypnea.      Breath sounds: No decreased breath sounds or wheezing.   Musculoskeletal:         General: Normal range of motion.      Cervical back: Normal range of motion.      Right lower leg: No edema.      Left lower leg: No edema.   Skin:     General: Skin is warm.      Capillary Refill: Capillary refill takes less than 2 seconds.      Coloration: Skin is not cyanotic or pale.   Neurological:      General: No focal deficit present.      Mental Status: She is alert and oriented to person, place, and time.         Procedures           ED Course                                           MDM  Number of Diagnoses or Management Options     Amount and/or Complexity of Data Reviewed  Clinical lab tests: reviewed and ordered  Tests in the radiology section of CPT®: reviewed and ordered  Tests in the medicine section of CPT®: reviewed and ordered  Decide to obtain previous medical records or to obtain history from someone other than the  patient: yes  Review and summarize past medical records: yes  Independent visualization of images, tracings, or specimens: yes        Final diagnoses:   Chest pain, unspecified type       ED Disposition  ED Disposition     ED Disposition   Discharge    Condition   Stable    Comment   --             Alex Og MD  [address] [facility] [address]  [phone]    Go in 2 days  Medical evalution         Medication List      No changes were made to your prescriptions during this visit.          Nevaeh Brand MD  10/27/22 1111

## 2022-12-18 NOTE — ED PROVIDER NOTES
I spoke with Anoop in the [facility] lab, she was able to successfully add on additional test.    ----- Message from Moy العلي MD sent at 1/29/2019 12:32 PM EST -----  Blood sugar is high. Add A1C please (to these labs)     Subjective     History provided by:  EMS personnel   used: No    Cardiac Arrest  Witnessed by:  Not witnessed  Incident location:  Home  Time before BLS initiated:  > 5 minutes  Time before ALS initiated:  > 10 minutes  Condition upon EMS arrival:  Unresponsive  Pulse:  Absent  Initial cardiac rhythm per EMS:  Asystole  Treatments prior to arrival:  ACLS protocol, intubation and vascular access  Medications given prior to ED:  Epinephrine  IV access type:  Intraosseous  Airway:  Intubation prior to arrival  Rhythm on admission to ED:  Asystole  Associated symptoms: no chest pain, no difficulty breathing, no dizziness, no fatigue, no heartburn, no loss of consciousness, no palpitations, no shortness of breath, no syncope, no vomiting and no weakness    Risk factors: heart problem and hyperlipidemia    Risk factors: no COPD, no diabetes mellitus, no drug overdose, no head injury and no trauma        Review of Systems   Unable to perform ROS: Acuity of condition   Constitutional: Negative for activity change, appetite change, chills, diaphoresis, fatigue and fever.   HENT: Negative for congestion, ear pain and sore throat.    Eyes: Negative for redness.   Respiratory: Negative for cough, chest tightness, shortness of breath and wheezing.    Cardiovascular: Negative for chest pain, palpitations, leg swelling and syncope.   Gastrointestinal: Negative for abdominal pain, diarrhea, heartburn, nausea and vomiting.   Genitourinary: Negative for dysuria and urgency.   Musculoskeletal: Negative for arthralgias, back pain, myalgias and neck pain.   Skin: Negative for pallor, rash and wound.   Neurological: Negative for dizziness, loss of consciousness, speech difficulty, weakness and headaches.   Psychiatric/Behavioral: Negative for agitation, behavioral problems, confusion and decreased concentration.   All other systems reviewed and are negative.      Past Medical History:   Diagnosis Date   • Arthritis     • Coronary artery disease    • GERD (gastroesophageal reflux disease)    • Hyperlipidemia    • Hypertension        Allergies   Allergen Reactions   • Clindamycin/Lincomycin GI Intolerance   • Doxycycline Unknown (See Comments)     See chart   • Latex Hives   • Penicillins Rash   • Sulfa Antibiotics Rash       Past Surgical History:   Procedure Laterality Date   • APPENDECTOMY     • BLADDER REPAIR     • CARDIAC CATHETERIZATION     • CARDIAC CATHETERIZATION N/A 3/22/2017    Procedure: Left Heart Cath;  Surgeon: Alex Blanco MD;  Location:  [facility] INVASIVE LOCATION;  Service:    • CARDIAC SURGERY     • CHOLECYSTECTOMY     • CORONARY ARTERY BYPASS GRAFT     • CORONARY STENT PLACEMENT     • HERNIA REPAIR     • HIP HEMIARTHROPLASTY Left 8/19/2022    Procedure: HIP HEMIARTHROPLASTY LEFT;  Surgeon: Jeramy Reid Jr., MD;  Location:  [facility];  Service: Orthopedics;  Laterality: Left;   • HYSTERECTOMY         Family History   Problem Relation Age of Onset   • Heart attack Mother    • Anuerysm Father    • Heart attack Sister    • Heart disease Sister    • Heart disease Brother        Social History     Socioeconomic History   • Marital status:    Tobacco Use   • Smoking status: Never   • Smokeless tobacco: Never   Substance and Sexual Activity   • Alcohol use: No   • Drug use: No   • Sexual activity: Defer           Objective   Physical Exam  Vitals and nursing note reviewed.   Constitutional:       Interventions: She is intubated.   HENT:      Head: Normocephalic and atraumatic.      Right Ear: Tympanic membrane, ear canal and external ear normal. There is no impacted cerumen.      Left Ear: Tympanic membrane, ear canal and external ear normal. There is no impacted cerumen.      Nose: Nose normal.      Mouth/Throat:      Mouth: Mucous membranes are dry.   Eyes:      Comments: Pupils dilated bilaterally with sluggish reaction.   Cardiovascular:      Comments: No pulse and no heart sounds.  Pulmonary:       Effort: She is intubated.   Abdominal:      General: Abdomen is flat. Bowel sounds are normal. There is no distension.      Palpations: Abdomen is soft.      Tenderness: There is no abdominal tenderness.   Musculoskeletal:         General: No swelling.      Cervical back: Normal range of motion and neck supple.   Skin:     Capillary Refill: Capillary refill takes more than 3 seconds.      Coloration: Skin is pale.   Neurological:      Mental Status: She is unresponsive.         Procedures           ED Course  ED Course as of 22 0642   Sun Dec 18, 2022   0640 Patient brought into the emergency department after being found unresponsive by her  at home.  EMS arrived at the scene and intubated the patient and provided ACLS protocol in route.  Patient received 5 doses of epinephrine and never regained any ROSC and/or had a shockable rhythm.  Continued aggressive advanced ACLS protocol when she arrived to the emergency department for several minutes and never regained a pulse and/or rhythm sustainable with life.  Patient was pronounced dead. [ES]      ED Course User Index  [ES] Sachin Hartman MD                                           MDM  Number of Diagnoses or Management Options     Amount and/or Complexity of Data Reviewed  Review and summarize past medical records: yes  Independent visualization of images, tracings, or specimens: yes    Risk of Complications, Morbidity, and/or Mortality  Presenting problems: high  Diagnostic procedures: high  Management options: high    Critical Care  Total time providing critical care: 30-74 minutes    Patient Progress  Patient progress: other (comment) (CRITICAL)      Final diagnoses:   Dead on arrival   Cardiac arrest (HCC)       ED Disposition  ED Disposition     ED Disposition       Condition   --    Comment   --             No follow-up provider specified.       Medication List      No changes were made to your prescriptions during this  visit.          Sacihn Hartman MD  12/18/22 4718

## 2023-02-09 NOTE — PROGRESS NOTES
[facility]  PROGRESS NOTE     Patient Identification:  Name:  Eloina Batista  Age:  82 y.o.  Sex:  female  :  1940  MRN:  [id number]  Visit Number:  [id number]  ROOM: [facility]     Primary Care Provider:  Alex Og MD    Length of stay in inpatient status:  14    Subjective     Chief Compliant:  No chief complaint on file.      History of Presenting Illness: 82-year-old female who is status post left hip fracture with repair.  Patient has a past history of CAD, recent lumbar back surgery, atrial fibrillation, CAD and hypertension.  Patient has no complaints this morning.  States that she did well with her therapy yesterday and is anxious to go home tomorrow.    Objective     Current Hospital Meds:apixaban, 5 mg, Oral, Q12H  atorvastatin, 80 mg, Oral, Nightly  calcium carbonate (oyster shell), 500 mg, Oral, Daily  cetirizine, 10 mg, Oral, Daily  clopidogrel, 75 mg, Oral, Daily  estrogens (conjugated), 0.3 mg, Oral, Daily  famotidine, 20 mg, Oral, BID AC  metoprolol succinate XL, 125 mg, Oral, Q24H  multivitamin, 1 tablet, Oral, Daily  neomycin-bacitracin-polymyxin, 1 application, Topical, Q12H  polyethylene glycol, 17 g, Oral, Daily  valsartan, 80 mg, Oral, Q24H  vitamin B-12, 1,000 mcg, Oral, Daily  vitamin B-6, 25 mg, Oral, TID  vitamin D3, 5,000 Units, Oral, Daily  vitamin E, 400 Units, Oral, BID       ----------------------------------------------------------------------------------------------------------------------  Vital Signs:  Temp:  [98 °F (36.7 °C)-98.1 °F (36.7 °C)] 98 °F (36.7 °C)  Heart Rate:  [78-94] 90  Resp:  [18-20] 18  BP: (103-109)/(70-75) 109/75  SpO2:  [98 %] 98 %  on   ;   Device (Oxygen Therapy): room air  Body mass index is 28.29 kg/m².    Wt Readings from Last 3 Encounters:   22 77.1 kg (170 lb)   22 77.5 kg (170 lb 13.7 oz)   21 77.1 kg (170 lb)     Intake & Output (last 3 days)        0701   0700  07 07  0701 09/08 0700 09/08 0701 09/09 0700    P.O. 1200 1080 1080     Total Intake(mL/kg) 1200 (15.6) 1080 (14) 1080 (14)     Urine (mL/kg/hr) 800 (0.4)       Stool        Total Output 800       Net +400 +1080 +1080             Urine Unmeasured Occurrence 4 x 4 x 6 x     Stool Unmeasured Occurrence  3 x 1 x         Diet Regular  ----------------------------------------------------------------------------------------------------------------------  Physical exam:  Constitutional:   No acute distress   HEENT: Cephalic atraumatic  Neck: Supple   Cardiovascular: Irregular irregular  Pulmonary/Chest: Clear to auscultation  Abdominal: Positive bowel sounds soft.   Musculoskeletal: Status post hip repair  Neurological: No focal deficits  Skin: Rash  Peripheral vascular:  Genitourinary:  ----------------------------------------------------------------------------------------------------------------------    Last echocardiogram:  Results for orders placed during the hospital encounter of 08/18/22    Adult Transthoracic Echo Complete W/ Cont if Necessary Per Protocol    Interpretation Summary  · Estimated left ventricular EF = 66% Left ventricular ejection fraction appears to be 66 - 70%. Left ventricular systolic function is hyperdynamic (EF > 70%).  · Mild to moderate aortic valve regurgitation is present.  · Left ventricular wall thickness is consistent with hypertrophy.  · Left ventricular diastolic function was indeterminate.  · There is calcification of the aortic valve.  · Estimated right ventricular systolic pressure from tricuspid regurgitation is normal (<35 mmHg). Calculated right ventricular systolic pressure from tricuspid regurgitation is 33 mmHg.    ----------------------------------------------------------------------------------------------------------------------  Results from last 7 days   Lab Units 09/04/22  0032 09/02/22  0728   WBC 10*3/mm3 5.40 9.64   HEMOGLOBIN g/dL 10.2* 9.4*   HEMATOCRIT % 33.3* 30.7*    MCV fL 101.5* 102.7*   MCHC g/dL 30.6* 30.6*   PLATELETS 10*3/mm3 284 286         Results from last 7 days   Lab Units 09/04/22  0032 09/02/22  0728   SODIUM mmol/L 135* 137   POTASSIUM mmol/L 4.1 4.2   CHLORIDE mmol/L 104 103   CO2 mmol/L 21.4* 23.5   BUN mg/dL 10 11   CREATININE mg/dL 0.94 0.96   CALCIUM mg/dL 9.6 9.4   GLUCOSE mg/dL 108* 119*   ALBUMIN g/dL 2.78* 2.61*   BILIRUBIN mg/dL 1.1 1.3*   ALK PHOS U/L 111 107   AST (SGOT) U/L 35* 36*   ALT (SGPT) U/L 26 24   Estimated Creatinine Clearance: 48.2 mL/min (by C-G formula based on SCr of 0.94 mg/dL).  No results found for: AMMONIA  Results from last 7 days   Lab Units 09/02/22  0728   CK TOTAL U/L 43             No results found for: HGBA1C, POCGLU  Lab Results   Component Value Date    TSH 2.170 09/03/2022     No results found for: PREGTESTUR, PREGSERUM, HCG, HCGQUANT  Pain Management Panel    There is no flowsheet data to display.       Brief Urine Lab Results  (Last result in the past 365 days)      Color   Clarity   Blood   Leuk Est   Nitrite   Protein   CREAT   Urine HCG        08/21/22 0805 Yellow   Clear   Negative   Moderate (2+)   Negative   Negative               No results found for: BLOODCX      No results found for: URINECX  No results found for: WOUNDCX  No results found for: STOOLCX        I have personally looked at the labs and they are summarized above.  ----------------------------------------------------------------------------------------------------------------------  Detailed radiology reports for the last 24 hours:    Imaging Results (Last 24 Hours)     ** No results found for the last 24 hours. **        Final impressions for the last 30 days of radiology reports:    XR Hand 2 View Left    Result Date: 8/28/2022    No acute findings in the left hand.  This report was finalized on 8/28/2022 12:17 PM by Dr. Joshua Chaudhry MD.      XR Femur 2 View Left    Result Date: 8/20/2022  Left hip arthroplasty noted in place. There is no evidence  of additional, previously unidentified fracture or dislocation. The knee joint is partially imaged and appears normally aligned.  This report was finalized on 8/20/2022 9:13 AM by Aristeo Duvall.      CT Pelvis Without Contrast    Result Date: 8/18/2022  Acute noncomminuted subcapital neck fracture of left proximal femur. Associated, relatively mild diffuse subcutaneous bruising. No evidence of acute trauma elsewhere.  This report was finalized on 8/18/2022 11:36 PM by Dr. Vinayak Birch MD.      XR Chest 1 View    Result Date: 8/21/2022  No acute cardiopulmonary abnormality. Electronically signed by:  Jose Schulz M.D.  8/21/2022 5:46 AM Mountain Time    XR Chest 1 View    Result Date: 8/19/2022  1.  No evidence for acute cardiopulmonary process.  This report was finalized on 8/19/2022 8:54 AM by Jeramy Mejia MD.      CT Lower Extremity Left Without Contrast    Result Date: 8/21/2022  1.  Status post left hip arthroplasty. No evidence of periprosthetic fracture within limitations of artifact from the arthroplasty. Electronically signed by:  Roger Rucker M.D.  8/21/2022 12:08 AM Mountain Time    CT Lower Extremity Left Without Contrast    Result Date: 8/20/2022  No evidence of acute fracture. Arthroplasty hardware appears in appropriate position. There is a large hematoma present posterior to the femoral component and deep to the gluteus musculature, measuring 8.8 x 4.8 x 5.5 cm.  This report was finalized on 8/20/2022 3:06 PM by Aristeo Duvall.      XR Spine Lumbar Complete 4+VW    Result Date: 8/18/2022  Arthritic change, but no acute bony abnormality  This report was finalized on 8/18/2022 7:59 AM by Dr. Joshua Chaudhry MD.      XR Hip With or Without Pelvis 1 View Left    Result Date: 8/20/2022  Postarthroplasty changes without identified complication. Electronically signed by:  Jose Schulz M.D.  8/20/2022 5:02 AM Mountain Time    XR Hip With or Without Pelvis 2 - 3 View Left    Result Date: 9/1/2022     Left hip prosthesis. Alignment anatomic.  This report was finalized on 9/1/2022 11:41 AM by Dr. Joshua Chaudhry MD.      XR Hip With or Without Pelvis 2 - 3 View Left    Result Date: 8/20/2022  Postsurgical findings of interval left total hip arthroplasty without evidence of immediate hardware complication. Expected soft tissue edema and subcutaneous emphysema.    This report was finalized on 8/20/2022 5:54 AM by Aristeo Duvall.      XR Hips Bilateral With or Without Pelvis 5 View    Result Date: 8/18/2022  Mild arthritic change.  This report was finalized on 8/18/2022 9:31 AM by Dr. Joshua Chaudhry MD.      I have personally looked at the radiology images and read the final radiology report.    Assessment & Plan    Status post left hip fracture with ORIF--patient requiring contact-guard for transfers; ambulated 60 feet with front wheel walker and contact-guard yesterday.  Requiring minimum assist for bathing; moderate assist for lower body dressing set up for upper body dressing and grooming; continues to work on motor skills to improve ADLs and functional mobility.    Atrial fibrillation continue Eliquis and Toprol-XL.  Rate fairly well controlled    CAD continue high-dose statin therapy and Plavix    Hypertension well controlled continue beta-blocker and angiotensin receptor blocker therapy.    Anemia stable    VTE Prophylaxis:   Mechanical Order History:     None      Pharmalogical Order History:      Ordered     Dose Route Frequency Stop    08/25/22 2136  apixaban (ELIQUIS) tablet 5 mg         5 mg PO Every 12 Hours Scheduled --                    Jose Fatima MD  [facility]  09/08/22  10:10 EDT   Interpolation Flap Text: A decision was made to reconstruct the defect utilizing an interpolation axial flap and a staged reconstruction.  A telfa template was made of the defect.  This telfa template was then used to outline the interpolation flap.  The donor area for the pedicle flap was then injected with anesthesia.  The flap was excised through the skin and subcutaneous tissue down to the layer of the underlying musculature.  The interpolation flap was carefully excised within this deep plane to maintain its blood supply.  The edges of the donor site were undermined.   The donor site was closed in a primary fashion.  The pedicle was then rotated into position and sutured.  Once the tube was sutured into place, adequate blood supply was confirmed with blanching and refill.  The pedicle was then wrapped with xeroform gauze and dressed appropriately with a telfa and gauze bandage to ensure continued blood supply and protect the attached pedicle.
